# Patient Record
Sex: FEMALE | Race: WHITE | NOT HISPANIC OR LATINO | Employment: FULL TIME | URBAN - METROPOLITAN AREA
[De-identification: names, ages, dates, MRNs, and addresses within clinical notes are randomized per-mention and may not be internally consistent; named-entity substitution may affect disease eponyms.]

---

## 2019-07-10 ENCOUNTER — TRANSFERRED RECORDS (OUTPATIENT)
Dept: HEALTH INFORMATION MANAGEMENT | Facility: CLINIC | Age: 29
End: 2019-07-10

## 2019-07-26 NOTE — TELEPHONE ENCOUNTER
RECORDS RECEIVED FROM: referred by Dr. Eliecer Kerr at [facility] for a mass of the right toe. Appt per pt.   DATE RECEIVED: Aug 1, 2019    NOTES STATUS DETAILS   OFFICE NOTE from referring provider Care Everywhere 7/24/19 Dr. Kerr   OFFICE NOTE from other specialist N/A    DISCHARGE SUMMARY from hospital N/A    DISCHARGE REPORT from the ER N/A    OPERATIVE REPORT N/A    MEDICATION LIST Care Everywhere    IMPLANT RECORD/STICKER N/A    LABS     CBC/DIFF N/A    CULTURES N/A    INJECTIONS DONE IN RADIOLOGY Care Everywhere 7/10/19 - report only in [facility]   MRI N/A    CT SCAN N/A    XRAYS (IMAGES & REPORTS) N/A    TUMOR     PATHOLOGY  Slides & report N/A

## 2019-08-01 ENCOUNTER — OFFICE VISIT (OUTPATIENT)
Dept: ORTHOPEDICS | Facility: CLINIC | Age: 29
End: 2019-08-01
Payer: COMMERCIAL

## 2019-08-01 ENCOUNTER — PRE VISIT (OUTPATIENT)
Dept: ORTHOPEDICS | Facility: CLINIC | Age: 29
End: 2019-08-01

## 2019-08-01 VITALS — BODY MASS INDEX: 22.21 KG/M2 | WEIGHT: 141.5 LBS | HEIGHT: 67 IN

## 2019-08-01 DIAGNOSIS — D23.9 FIBROUS HISTIOCYTOMA: Primary | ICD-10-CM

## 2019-08-01 ASSESSMENT — MIFFLIN-ST. JEOR: SCORE: 1389.47

## 2019-08-01 NOTE — LETTER
8/1/2019       RE: Reyna Martinez  [id number] [facility] [address]     Dear Colleague,    Thank you for referring your patient, Reyna Martinez, to the [facility]. Please see a copy of my visit note below.        [facility], Orthopaedic Oncology Surgery Consultation  by Raoul Seals M.D.    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 We will obtain a biopsy of the tissue to evaluate for possible malignancy. I expect her to continue healing appropriately.      Plan:  1. I will request slides and review pathology at the [facility] for second opinion.  2. I discussed proper wound care for her post-operative course including avoiding exposure to sunlight and using bacitracin to promote healing.   3. Will continue managing with serial observation.  4. We will consider an excision only if the tumor recurs.   5. Follow-up in 6 months.           History of Present Illness:   29 year old female 3 weeks s/p excision of a soft tissue mass of her right great toe. She reports that her mass began growing around November 2018. It was only painful to palpation. She underwent surgery 3 weeks ago to have the mass removed by Dr. Eliecer Zaragoza in podiatry. Etiology was of benign fibrous histiocytoma of the tendon sheath.Today, she reports that she is recovering well following her recent procedure. Her wound continues to heal appropriately and she no other concerns.     Background history:  DX:  1. 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     TREATMENTS:  1. 7/10/19, Right toe mass excision, [place]. Eliecer Zaragoza[place] [facility]         Physical Exam:     EXAMINATION pertinent findings:   VITAL SIGNS:  "Height 1.702 m (5' 7\"), weight 64.2 kg (141 lb 8 oz).  Body mass index is 22.16 kg/m .  MUSCULOSKELETAL:   Gait is normal.  0-20 flexion of the MP and IP joint.  wound is healing.  Sutures in place.  Minimal drainage from excision.    Pre excision photograph:               Data:   Pathology reports via [facility] (7/17/19):  Soft tissue, right toe, excision:  --- Cellular benign fibrous histiocytoma, incompletely excised.  --- Complete excision recommended.     All laboratory data reviewed  All imaging studies reviewed by me    Attending MD (Dr. Raoul Seals) Attestation :  This patient was seen and evaluated by me including a history, exam, and interpretation of all imaging and/or lab data.  Either a training physician (resident/fellow), who also saw the patient, or scribe has documented the clinic visit in the attached note.    Raoul Seals MD  [facility]  Oncology and Adult Reconstructive Surgery  Dept Orthopaedic Surgery, [facility]  [phone] office, [phone] pager  [email or web address]    Scribe Disclosure:  I, Candido Kaye, am serving as a scribe to document services personally performed by Raoul Seals MD at this visit, based upon the provider's statements to me. All documentation has been reviewed by the aforementioned provider prior to being entered into the official medical record.       DATA for DOCUMENTATION:         Past Medical History:   There is no problem list on file for this patient.    No past medical history on file.    Also see scanned health assessment forms.       Past Surgical History:   No past surgical history on file.         Social History:     Social History     Socioeconomic History     Marital status:      Spouse name: Not on file     Number of children: Not on file     Years of education: Not on file     Highest education level: Not on file   Occupational History     Not on file   Social Needs     Financial resource strain: Not on file     Food " insecurity:     Worry: Not on file     Inability: Not on file     Transportation needs:     Medical: Not on file     Non-medical: Not on file   Tobacco Use     Smoking status: Never Smoker     Smokeless tobacco: Never Used   Substance and Sexual Activity     Alcohol use: Never     Drug use: Never     Sexual activity: Yes     Partners: Male     Birth control/protection: Condom   Lifestyle     Physical activity:     Days per week: Not on file     Minutes per session: Not on file     Stress: Not on file   Relationships     Social connections:     Talks on phone: Not on file     Gets together: Not on file     Attends [employer] service: Not on file     Active member of club or organization: Not on file     Attends meetings of clubs or organizations: Not on file     Relationship status: Not on file     Intimate partner violence:     Fear of current or ex partner: Not on file     Emotionally abused: Not on file     Physically abused: Not on file     Forced sexual activity: Not on file   Other Topics Concern     Not on file   Social History Narrative     Not on file            Family History:     Family History   Problem Relation Age of Onset     Diabetes Cousin         My cousin on my moms side and his son have type 1 diabetes     Colon Cancer Other         Uncle passed away with colon cancer     Prostate Cancer Maternal Grandfather      Prostate Cancer Paternal Grandfather             Medications:     Current Outpatient Medications   Medication Sig     Ibuprofen-diphenhydrAMINE Cit (ADVIL PM PO) Take by mouth as needed     No current facility-administered medications for this visit.             Review of Systems:   A comprehensive 10 point review of systems (constitutional, ENT, cardiac, peripheral vascular, lymphatic, respiratory, GI, , Musculoskeletal, skin, Neurological) was performed and found to be negative except as described in this note.     See intake form completed by patient    Again, thank you for allowing  me to participate in the care of your patient.      Sincerely,    Raoul Seals MD

## 2019-08-01 NOTE — NURSING NOTE
"Chief Complaint   Patient presents with     Consult     Pt.states that she is here today after having a Mass Excision of Right Great Toe on 07/10/2019 by Dr. Antwan Butterfield, Podiatrist. She states that she has had no Imaging done of the Toe/Mass. Referring:  ANTWAN BUTTERFIELD, DPLIZ       29 year old  1990    Ht 1.702 m (5' 7\")   Wt 64.2 kg (141 lb 8 oz)   BMI 22.16 kg/m            [facility] [address] [place] [address] - [address]    No Known Allergies    Current Outpatient Medications   Medication     Ibuprofen-diphenhydrAMINE Cit (ADVIL PM PO)     No current facility-administered medications for this visit.                    "

## 2019-08-01 NOTE — PROGRESS NOTES
"    [facility], Orthopaedic Oncology Surgery Consultation  by Raoul Seals M.D.    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 We will obtain a biopsy of the tissue to evaluate for possible malignancy. I expect her to continue healing appropriately.      Plan:  1. I will request slides and review pathology at the [facility] for second opinion.  2. I discussed proper wound care for her post-operative course including avoiding exposure to sunlight and using bacitracin to promote healing.   3. Will continue managing with serial observation.  4. We will consider an excision only if the tumor recurs.   5. Follow-up in 6 months.            History of Present Illness:   29 year old female 3 weeks s/p excision of a soft tissue mass of her right great toe. She reports that her mass began growing around November 2018. It was only painful to palpation. She underwent surgery 3 weeks ago to have the mass removed by Dr. Eliecer Zaragoza in podiatry. Etiology was of benign fibrous histiocytoma of the tendon sheath.Today, she reports that she is recovering well following her recent procedure. Her wound continues to heal appropriately and she no other concerns.       Background history:  DX:  1. 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     TREATMENTS:  1. 7/10/19, Right toe mass excision, Dr. Eliecer Zaragoza, [facility].             Physical Exam:     EXAMINATION pertinent findings:   VITAL SIGNS: Height 1.702 m (5' 7\"), weight 64.2 kg (141 lb 8 oz).  Body mass index is 22.16 kg/m .  MUSCULOSKELETAL:   Gait is normal.  0-20 flexion of the MP and IP joint.  wound is healing.  Sutures in place.  Minimal drainage from excision.      Pre excision " photograph:                 Data:   [place] [facility] ([place]):  Soft tissue, right toe, excision:  --- Cellular benign fibrous histiocytoma, incompletely excised.  --- Complete excision recommended.     All laboratory data reviewed  All imaging studies reviewed by me    Attending MD (Dr. Raoul Seals) Attestation :  This patient was seen and evaluated by me including a history, exam, and interpretation of all imaging and/or lab data.  Either a training physician (resident/fellow), who also saw the patient, or scribe has documented the clinic visit in the attached note.    Raoul Seals MD  [facility] [[email or web address]] Family Professor  Oncology and Adult Reconstructive Surgery  Dept Orthopaedic Surgery, [facility]  [phone] office, [phone] pager  [email or web address]      Scribe Disclosure:  I, Candido Kaye, am serving as a scribe to document services personally performed by Raoul Seals MD at this visit, based upon the provider's statements to me. All documentation has been reviewed by the aforementioned provider prior to being entered into the official medical record.       DATA for DOCUMENTATION:         Past Medical History:   There is no problem list on file for this patient.    No past medical history on file.    Also see scanned health assessment forms.       Past Surgical History:   No past surgical history on file.         Social History:     Social History     Socioeconomic History     Marital status:      Spouse name: Not on file     Number of children: Not on file     Years of education: Not on file     Highest education level: Not on file   Occupational History     Not on file   Social Needs     Financial resource strain: Not on file     Food insecurity:     Worry: Not on file     Inability: Not on file     Transportation needs:     Medical: Not on file     Non-medical: Not on file   Tobacco Use     Smoking status: Never Smoker     Smokeless tobacco: Never Used   Substance and Sexual  Activity     Alcohol use: Never     Drug use: Never     Sexual activity: Yes     Partners: Male     Birth control/protection: Condom   Lifestyle     Physical activity:     Days per week: Not on file     Minutes per session: Not on file     Stress: Not on file   Relationships     Social connections:     Talks on phone: Not on file     Gets together: Not on file     Attends Sikh service: Not on file     Active member of club or organization: Not on file     Attends meetings of clubs or organizations: Not on file     Relationship status: Not on file     Intimate partner violence:     Fear of current or ex partner: Not on file     Emotionally abused: Not on file     Physically abused: Not on file     Forced sexual activity: Not on file   Other Topics Concern     Not on file   Social History Narrative     Not on file            Family History:       Family History   Problem Relation Age of Onset     Diabetes Cousin         My cousin on my moms side and his son have type 1 diabetes     Colon Cancer Other         Uncle passed away with colon cancer     Prostate Cancer Maternal Grandfather      Prostate Cancer Paternal Grandfather             Medications:     Current Outpatient Medications   Medication Sig     Ibuprofen-diphenhydrAMINE Cit (ADVIL PM PO) Take by mouth as needed     No current facility-administered medications for this visit.               Review of Systems:   A comprehensive 10 point review of systems (constitutional, ENT, cardiac, peripheral vascular, lymphatic, respiratory, GI, , Musculoskeletal, skin, Neurological) was performed and found to be negative except as described in this note.     See intake form completed by patient      Answers for HPI/ROS submitted by the patient on 7/29/2019   General Symptoms: No  Skin Symptoms: No  HENT Symptoms: No  EYE SYMPTOMS: No  HEART SYMPTOMS: No  LUNG SYMPTOMS: No  INTESTINAL SYMPTOMS: No  URINARY SYMPTOMS: No  GYNECOLOGIC SYMPTOMS: No  BREAST SYMPTOMS:  No  SKELETAL SYMPTOMS: No  BLOOD SYMPTOMS: No  NERVOUS SYSTEM SYMPTOMS: No  MENTAL HEALTH SYMPTOMS: No

## 2019-08-06 PROCEDURE — 00000346 ZZHCL STATISTIC REVIEW OUTSIDE SLIDES TC 88321: Performed by: ORTHOPAEDIC SURGERY

## 2019-08-11 LAB — COPATH REPORT: NORMAL

## 2019-09-26 ENCOUNTER — TRANSFERRED RECORDS (OUTPATIENT)
Dept: HEALTH INFORMATION MANAGEMENT | Facility: CLINIC | Age: 29
End: 2019-09-26

## 2019-09-26 LAB
HPV ABSTRACT: NORMAL
PAP-ABSTRACT: NORMAL

## 2019-10-17 ENCOUNTER — TRANSFERRED RECORDS (OUTPATIENT)
Dept: HEALTH INFORMATION MANAGEMENT | Facility: CLINIC | Age: 29
End: 2019-10-17

## 2019-10-17 LAB
CHOLEST SERPL-MCNC: 143 MG/DL
HDLC SERPL-MCNC: 67 MG/DL
LDLC SERPL CALC-MCNC: 60 MG/DL
TRIGL SERPL-MCNC: 81 MG/DL

## 2019-10-21 ENCOUNTER — OFFICE VISIT (OUTPATIENT)
Dept: ORTHOPEDICS | Facility: CLINIC | Age: 29
End: 2019-10-21
Payer: COMMERCIAL

## 2019-10-21 ENCOUNTER — ANCILLARY PROCEDURE (OUTPATIENT)
Dept: GENERAL RADIOLOGY | Facility: CLINIC | Age: 29
End: 2019-10-21
Attending: ORTHOPAEDIC SURGERY
Payer: COMMERCIAL

## 2019-10-21 DIAGNOSIS — D23.9 FIBROUS HISTIOCYTOMA: ICD-10-CM

## 2019-10-21 DIAGNOSIS — D23.9 FIBROUS HISTIOCYTOMA: Primary | ICD-10-CM

## 2019-10-21 RX ORDER — ETONOGESTREL AND ETHINYL ESTRADIOL VAGINAL RING .015; .12 MG/D; MG/D
RING VAGINAL
COMMUNITY
Start: 2019-09-26 | End: 2022-04-13

## 2019-10-21 NOTE — LETTER
10/21/2019       RE: Reyna Martinez  [address] [facility] [address]     Dear Colleague,    Thank you for referring your patient, Reyna Martinez, to the [facility] at [facility]. Please see a copy of my visit note below.    [facility], Orthopaedic Oncology Surgery Consultation  by Raoul Seals M.D.    Reyna Martinez MRN# [id number]   Age: 29 year old YOB: 1990     Requesting physician: Morelia Patel     Background history:  DX:  1. 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      TREATMENTS:  1. 7/10/19, Right toe mass excision, Dr. Eliecer Zaragoza, [facility].            History of Present Illness:   29 year old female  3 months s/p excision of a soft tissue mass of her right great toe.  I last evaluated this patient on 08/01/2019 and she reported that her mass began growing around November 2018. Reported an etiology of of benign fibrous histiocytoma of the tendon sheath. We elected to proceed with a pathology consult and watchful waiting between that time and our 6 month follow up. Today, she reports that the excised big toe mass has begun to grow back. The mass was larger the first time but she notes that the new mass has gotten more sensitive and is growing. She is unable to bend her DIP joint of her right big toe. 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            Physical Exam:     EXAMINATION pertinent findings:   MUSCULOSKELETAL:                                Data:   All laboratory data reviewed  All imaging studies reviewed by me    XR Right Toe - 2 Views (10/21/2019):  No acute osseous abnormality.         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 If we were to excise this mass, she could possibly lose her tendon and the skin in an oval shaped defect around the mass will be created. She also has an toe  extension contracture that could be aggravated by surgery. We spoke about IP joint fusing to retain walking function if a deformity develops. Her procedure may require the collaboration of a tumor, foot, and plastic surgeon for optimal success.     Plan:  1. I will discuss with my colleagues at the plastics service re soft tissue reconstruction as re-excision will be necessary.  2. Ordered XR and MRI for further evaluation of the mass.  3. Follow-up with imaging results.    Total Time = 25 min, 50% of which was spent in counseling and coordination of care as documented above.    Attending MD (Dr. Raoul Seals) Attestation :  This patient was seen and evaluated by me including a history, exam, and interpretation of all imaging and/or lab data.  Either a training physician (resident/fellow), who also saw the patient, or scribe has documented the clinic visit in the attached note.    Raoul Seals MD  [facility] Professor  Oncology and Adult Reconstructive Surgery  Dept Orthopaedic Surgery, [facility]  [phone] office, [phone] pager  [email or web address]    Scribe Disclosure:  I, Amador Buchanan, am serving as a scribe to document services personally performed by Raoul Seals MD at this visit, based upon the provider's statements to me. All documentation has been reviewed by the aforementioned provider prior to being entered into the official medical record.     Answers for HPI/ROS submitted by the patient on 10/17/2019   General Symptoms: No  Skin Symptoms: No  HENT Symptoms: No  EYE SYMPTOMS: No  HEART SYMPTOMS: No  LUNG SYMPTOMS: No  INTESTINAL SYMPTOMS: No  URINARY SYMPTOMS: No  GYNECOLOGIC SYMPTOMS: No  BREAST SYMPTOMS: No  SKELETAL SYMPTOMS: No  BLOOD SYMPTOMS: No  NERVOUS SYSTEM SYMPTOMS: No  MENTAL HEALTH SYMPTOMS: No

## 2019-10-21 NOTE — NURSING NOTE
Chief Complaint   Patient presents with     RECHECK     Pt. states that she is here today for Poss. Recur. of her Right Toe mass that is above her incision.        29 year old  1990      [facility] - [place], [place] - [address]    No Known Allergies    Current Outpatient Medications   Medication     etonogestrel-ethinyl estradiol (NUVARING) 0.12-0.015 MG/24HR vaginal ring     Ibuprofen-diphenhydrAMINE Cit (ADVIL PM PO)     vitamin D3 (D3-50) 58355 units capsule     No current facility-administered medications for this visit.

## 2019-10-21 NOTE — PROGRESS NOTES
[facility] Phys[facility], Orthopaedic Oncology Surgery Consultation  by Raoul Seals M.D.    Reyna Martinez MRN# [id number]   Age: 29 year old YOB: 1990     Requesting physician: Morelia Patel     Background history:  DX:  1. 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      TREATMENTS:  1. 7/10/19, Right toe mass excision, [place]. Eliecer Zaragoza, [facility]            History of Present Illness:   29 year old female 3 months s/p excision of a soft tissue mass of her right great toe. I last evaluated this patient on 08/01/2019 and she reported that her mass began growing around November 2018. Reported an etiology of of benign fibrous histiocytoma of the tendon sheath. We elected to proceed with a pathology consult and watchful waiting between that time and our 6 month follow up. Today, she reports that the excised big toe mass has begun to grow back. The mass was larger the first time but she notes that the new mass has gotten more sensitive and is growing. She is unable to bend her DIP joint of her right big toe. 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            Physical Exam:     EXAMINATION pertinent findings:   MUSCULOSKELETAL:                                Data:   All laboratory data reviewed  All imaging studies reviewed by me    XR Right Toe - 2 Views (10/21/2019):  No acute osseous abnormality.         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 If we were to excise this mass, she could possibly lose her tendon and the skin in an oval shaped defect around the mass will be created. She also has an toe extension contracture that could be aggravated by surgery. We spoke about IP joint fusing to retain walking function if a deformity develops. Her procedure may require the collaboration of a tumor, foot, and plastic surgeon for optimal success.     Plan:  1. I will discuss with my  colleagues at the plastics service re soft tissue reconstruction as re-excision will be necessary.  2. Ordered XR and MRI for further evaluation of the mass.  3. Follow-up with imaging results.    Total Time = 25 min, 50% of which was spent in counseling and coordination of care as documented above.      Attending MD (Dr. Raoul Seals) Attestation :  This patient was seen and evaluated by me including a history, exam, and interpretation of all imaging and/or lab data.  Either a training physician (resident/fellow), who also saw the patient, or scribe has documented the clinic visit in the attached note.    MD Baylee Wakefield Family Professor  Oncology and Adult Reconstructive Surgery  Dept Orthopaedic Surgery, [facility]  [phone] office, [phone] pager  [email or web address]    Scribe Disclosure:  IAmador, am serving as a scribe to document services personally performed by Raoul Seals MD at this visit, based upon the provider's statements to me. All documentation has been reviewed by the aforementioned provider prior to being entered into the official medical record.     Answers for HPI/ROS submitted by the patient on 10/17/2019   General Symptoms: No  Skin Symptoms: No  HENT Symptoms: No  EYE SYMPTOMS: No  HEART SYMPTOMS: No  LUNG SYMPTOMS: No  INTESTINAL SYMPTOMS: No  URINARY SYMPTOMS: No  GYNECOLOGIC SYMPTOMS: No  BREAST SYMPTOMS: No  SKELETAL SYMPTOMS: No  BLOOD SYMPTOMS: No  NERVOUS SYSTEM SYMPTOMS: No  MENTAL HEALTH SYMPTOMS: No

## 2019-10-28 ENCOUNTER — TELEPHONE (OUTPATIENT)
Dept: ORTHOPEDICS | Facility: CLINIC | Age: 29
End: 2019-10-28

## 2019-10-28 NOTE — TELEPHONE ENCOUNTER
DIAGNOSIS: Right big toe mass.  Ref. Dr. Seals   APPOINTMENT DATE: Oct 29, 2019    NOTES STATUS DETAILS   OFFICE NOTE from referring provider Internal 10/21/19 Dr. Seals   OFFICE NOTE from other specialist Care Everywhere 8/1/19 Dr. Kerr   DISCHARGE SUMMARY from hospital N/A    DISCHARGE REPORT from the ER N/A    OPERATIVE REPORT Care Everywhere 7/10/19 Dr. Kerr    MEDICATION LIST Internal    IMPLANT RECORD/STICKER N/A    LABS     CBC/DIFF Care Everywhere 10/17/19   CULTURES N/A    INJECTIONS DONE IN RADIOLOGY N/A    MRI Internal 10/29/19   CT SCAN N/A    XRAYS (IMAGES & REPORTS) Internal 10/21/19   TUMOR     PATHOLOGY  Slides & report Care Everywhere 7/10/19    8/6/19 internal

## 2019-10-29 ENCOUNTER — OFFICE VISIT (OUTPATIENT)
Dept: ORTHOPEDICS | Facility: CLINIC | Age: 29
End: 2019-10-29
Payer: COMMERCIAL

## 2019-10-29 ENCOUNTER — ANCILLARY PROCEDURE (OUTPATIENT)
Dept: MRI IMAGING | Facility: CLINIC | Age: 29
End: 2019-10-29
Attending: ORTHOPAEDIC SURGERY
Payer: COMMERCIAL

## 2019-10-29 ENCOUNTER — PRE VISIT (OUTPATIENT)
Dept: ORTHOPEDICS | Facility: CLINIC | Age: 29
End: 2019-10-29

## 2019-10-29 DIAGNOSIS — D21.21: Primary | ICD-10-CM

## 2019-10-29 DIAGNOSIS — D23.9 FIBROUS HISTIOCYTOMA: ICD-10-CM

## 2019-10-29 PROCEDURE — 73720 MRI LWR EXTREMITY W/O&W/DYE: CPT | Mod: RT | Performed by: RADIOLOGY

## 2019-10-29 PROCEDURE — A9585 GADOBUTROL INJECTION: HCPCS | Performed by: ORTHOPAEDIC SURGERY

## 2019-10-29 RX ORDER — GADOBUTROL 604.72 MG/ML
7.5 INJECTION INTRAVENOUS ONCE
Status: COMPLETED | OUTPATIENT
Start: 2019-10-29 | End: 2019-10-29

## 2019-10-29 RX ADMIN — GADOBUTROL 6.5 ML: 604.72 INJECTION INTRAVENOUS at 09:05

## 2019-10-29 NOTE — LETTER
10/29/2019       RE: Reyna Martinez  [id number] [facility] [address]     Dear Colleague,    Thank you for referring your patient, Reyna Martinez, to the [facility] at [facility]. Please see a copy of my visit note below.    REFERRING PROVIDER: Raoul Seals    REASON FOR CONSULTATION: Possible soft tissue coverage following revision excision of right great toe fibrous histiocytoma.    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  She first noticed it in the fall 2018 and then underwent excision in the summer of this year.  She now has recurrence of the tumor.  It is symptomatic with pain especially when it is bumped into objects.  She has difficulty flexing the toe IP joint, though is able to extend it.  He denies any numbness or tingling.      MEDS:   Prior to Admission medications    Medication Sig Start Date End Date Taking? Authorizing Provider   etonogestrel-ethinyl estradiol (NUVARING) 0.12-0.015 MG/24HR vaginal ring Place vaginally every 28 days 9/26/19 9/25/20  Reported, Patient   Ibuprofen-diphenhydrAMINE Cit (ADVIL PM PO) Take by mouth as needed    Reported, Patient   vitamin D3 (D3-50) 72029 units capsule Take 50,000 Units by mouth 10/18/19 10/17/20  Reported, Patient       ALLERGIES: No Known Allergies    PMH: No past medical history on file.    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    SH:   Social History     Tobacco Use     Smoking status: Never Smoker     Smokeless tobacco: Never Used   Substance Use Topics     Alcohol use: Never       FH:   Family History   Problem Relation Age of Onset     Diabetes Cousin         My cousin on my moms side and his son have type 1 diabetes      Colon Cancer Other         Uncle passed away with colon cancer     Prostate Cancer Maternal Grandfather      Prostate Cancer Paternal Grandfather        ROS: Denies chest pain, shortness of breath, diabetes, MI, CVA, DVT, PE, and bleeding disorders.    PHYSICAL EXAMINATION:   General: No acute distress.  Right foot examination was performed in the presence of chaperone.  This revealed a 2.5 x 2.5 cm tender enlarged mass over the dorsum of the right great toe along its proximal aspect.  There is no sign of infection such as erythema or drainage.  Sensation light touch is intact over the tip of the right great toe.  She has difficulty flexing the IP joints, but has no difficulty extending it.  Dorsalis pedis and posterior tibial artery pulses are palpable.  There is a tattoo over the dorsum of the foot.    IMAGING: MRI of right great toe:  1. Corresponding to the skin marker, there is a circumscribed,  nonenhancing soft tissue mass overlying the dorsal proximal first  phalanx which may encase the adjacent tendon sheath, favoring  recurrence of benign fibrocystic histiocytoma. Comparison with prior  MRI if available would be helpful to compare signal characteristics.  2. Small joint effusion and subchondral edema at the first  metatarsal-phalangeal joint, likely degenerative.     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    PLAN: I explained to the patient that a composite soft tissue defect may be created at the dorsum of her right great toe to clear this tumor.  This may expose underlying tendon or bone.  If either of these 2 structures are exposed, we will require coverage of the wound with a flap.  Locally, she has an available reverse first dorsal metatarsal artery flap.  This will unfortunately involve her existing tattoo and will likely disfigure the foot.  A skin graft will likely be required to cover the donor site of the flap.  In all, this will take 3 to  4 hours as an outpatient with a potential overnight stay.  Another option would be to perform free tissue transfer from the right groin to the right foot utilizing the dorsalis pedis vessels as recipient vessels.  This will take 6 to 8 hours as an inpatient with postoperative admission for monitoring of the flap closely.  I explained the risks with surgery to include bleeding, infection, injury to surrounding structures, recurrence, chronic pain, chronic stiffness, need for revision surgery.  Patient accepts the associated risks and wishes to proceed with surgery.  My preference will be to obtain clear margins prior to definitive soft tissue reconstruction.  I will message Dr. Seals's team and coordinate a plan with them.  All questions were answered.    Total time spent with patient was 30 min of which greater than 50% was in counseling.    Answers for HPI/ROS submitted by the patient on 10/29/2019   General Symptoms: No  Skin Symptoms: No  HENT Symptoms: No  EYE SYMPTOMS: No  HEART SYMPTOMS: No  LUNG SYMPTOMS: No  INTESTINAL SYMPTOMS: No  URINARY SYMPTOMS: No  GYNECOLOGIC SYMPTOMS: No  BREAST SYMPTOMS: No  SKELETAL SYMPTOMS: No  BLOOD SYMPTOMS: No  NERVOUS SYSTEM SYMPTOMS: No  MENTAL HEALTH SYMPTOMS: No    Renato Tinoco MD

## 2019-10-29 NOTE — NURSING NOTE
Reason For Visit:   Chief Complaint   Patient presents with     Consult     Right [place] Mass. Ref. Dr Seals       Primary MD: Morelia Atkinson  Ref. MD: Dr Seals    Age: 29 year old    ?  No      There were no vitals taken for this visit.      Pain Assessment  Patient Currently in Pain: No      Current Outpatient Medications   Medication Sig Dispense Refill     etonogestrel-ethinyl estradiol (NUVARING) 0.12-0.015 MG/24HR vaginal ring Place vaginally every 28 days       Ibuprofen-diphenhydrAMINE Cit (ADVIL PM PO) Take by mouth as needed       vitamin D3 (D3-50) 43497 units capsule Take 50,000 Units by mouth         No Known Allergies    Nancy Barrios ATC

## 2019-10-29 NOTE — PROGRESS NOTES
REFERRING PROVIDER: Raoul Seals    REASON FOR CONSULTATION: Possible soft tissue coverage following revision excision of right great toe fibrous histiocytoma.    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  She first noticed it in the fall 2018 and then underwent excision in the summer of this year.  She now has recurrence of the tumor.  It is symptomatic with pain especially when it is bumped into objects.  She has difficulty flexing the toe IP joint, though is able to extend it.  He denies any numbness or tingling.      MEDS:   Prior to Admission medications    Medication Sig Start Date End Date Taking? Authorizing Provider   etonogestrel-ethinyl estradiol (NUVARING) 0.12-0.015 MG/24HR vaginal ring Place vaginally every 28 days 9/26/19 9/25/20  Reported, Patient   Ibuprofen-diphenhydrAMINE Cit (ADVIL PM PO) Take by mouth as needed    Reported, Patient   vitamin D3 (D3-50) 19691 units capsule Take 50,000 Units by mouth 10/18/19 10/17/20  Reported, Patient       ALLERGIES: No Known Allergies    PMH: No past medical history on file.    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    SH:   Social History     Tobacco Use     Smoking status: Never Smoker     Smokeless tobacco: Never Used   Substance Use Topics     Alcohol use: Never       FH:   Family History   Problem Relation Age of Onset     Diabetes Cousin         My cousin on my moms side and his son have type 1 diabetes     Colon Cancer Other         Uncle passed away with colon cancer     Prostate Cancer Maternal Grandfather      Prostate Cancer Paternal Grandfather        ROS: Denies chest pain, shortness of breath, diabetes, MI, CVA, DVT, PE, and bleeding disorders.    PHYSICAL EXAMINATION:    General: No acute distress.  Right foot examination was performed in the presence of chaperone.  This revealed a 2.5 x 2.5 cm tender enlarged mass over the dorsum of the right great toe along its proximal aspect.  There is no sign of infection such as erythema or drainage.  Sensation light touch is intact over the tip of the right great toe.  She has difficulty flexing the IP joints, but has no difficulty extending it.  Dorsalis pedis and posterior tibial artery pulses are palpable.  There is a tattoo over the dorsum of the foot.    IMAGING: MRI of right great toe:  1. Corresponding to the skin marker, there is a circumscribed,  nonenhancing soft tissue mass overlying the dorsal proximal first  phalanx which may encase the adjacent tendon sheath, favoring  recurrence of benign fibrocystic histiocytoma. Comparison with prior  MRI if available would be helpful to compare signal characteristics.  2. Small joint effusion and subchondral edema at the first  metatarsal-phalangeal joint, likely degenerative.     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    PLAN: I explained to the patient that a composite soft tissue defect may be created at the dorsum of her right great toe to clear this tumor.  This may expose underlying tendon or bone.  If either of these 2 structures are exposed, we will require coverage of the wound with a flap.  Locally, she has an available reverse first dorsal metatarsal artery flap.  This will unfortunately involve her existing tattoo and will likely disfigure the foot.  A skin graft will likely be required to cover the donor site of the flap.  In all, this will take 3 to 4 hours as an outpatient with a potential overnight stay.  Another option would be to perform free tissue transfer from the right groin to the right foot utilizing the dorsalis pedis vessels as recipient vessels.  This will take 6 to 8 hours as an inpatient with postoperative  admission for monitoring of the flap closely.  I explained the risks with surgery to include bleeding, infection, injury to surrounding structures, recurrence, chronic pain, chronic stiffness, need for revision surgery.  Patient accepts the associated risks and wishes to proceed with surgery.  My preference will be to obtain clear margins prior to definitive soft tissue reconstruction.  I will message Dr. Seals's team and coordinate a plan with them.  All questions were answered.    Total time spent with patient was 30 min of which greater than 50% was in counseling.    Answers for HPI/ROS submitted by the patient on 10/29/2019   General Symptoms: No  Skin Symptoms: No  HENT Symptoms: No  EYE SYMPTOMS: No  HEART SYMPTOMS: No  LUNG SYMPTOMS: No  INTESTINAL SYMPTOMS: No  URINARY SYMPTOMS: No  GYNECOLOGIC SYMPTOMS: No  BREAST SYMPTOMS: No  SKELETAL SYMPTOMS: No  BLOOD SYMPTOMS: No  NERVOUS SYSTEM SYMPTOMS: No  MENTAL HEALTH SYMPTOMS: No      11/13/2019 ADDENDUM  Discussed case with Dr. Seals. Given this is a benign tumor, goal is for great toe salvage. Patient is in agreement. Will plan for simultaneous tumor excision and immediate wound coverage with possible skin graft, possible biologic dressing. May require pinning for stability. Patient was updated on plan and counseled over phone.

## 2019-11-13 DIAGNOSIS — D21.21: Primary | ICD-10-CM

## 2019-11-13 RX ORDER — CEFAZOLIN SODIUM 2 G/50ML
2 SOLUTION INTRAVENOUS
Status: CANCELLED | OUTPATIENT
Start: 2019-11-13

## 2019-11-13 RX ORDER — CEFAZOLIN SODIUM 1 G/50ML
1 INJECTION, SOLUTION INTRAVENOUS SEE ADMIN INSTRUCTIONS
Status: CANCELLED | OUTPATIENT
Start: 2019-11-13

## 2019-11-14 DIAGNOSIS — R22.40 MASS OF TOE: Primary | ICD-10-CM

## 2019-11-18 ENCOUNTER — TELEPHONE (OUTPATIENT)
Dept: ORTHOPEDICS | Facility: CLINIC | Age: 29
End: 2019-11-18

## 2019-11-18 ENCOUNTER — PREP FOR PROCEDURE (OUTPATIENT)
Dept: ORTHOPEDICS | Facility: CLINIC | Age: 29
End: 2019-11-18

## 2019-11-18 DIAGNOSIS — R22.40 MASS OF TOE: Primary | ICD-10-CM

## 2019-11-18 NOTE — TELEPHONE ENCOUNTER
Patient is scheduled for surgery with Dr. Seals & Dr. Tinoco    Spoke or left message with: Patient    Date of Surgery: 12/6/19    Location: [facility]    Post op: To follow up with Dr. Tinoco    Pre-op with surgeon (if applicable): Complete    H&P: Patient will schedule with PCP    Additional imaging/appointments: N/A    Surgery packet: Mailed to patient's home 11/18/19    Additional comments: N/A

## 2019-11-25 ENCOUNTER — TEAM CONFERENCE (OUTPATIENT)
Dept: OTHER | Facility: CLINIC | Age: 29
End: 2019-11-25

## 2019-11-25 NOTE — TELEPHONE ENCOUNTER
SURGERY PLAN (PRE-OP PLAN)     Patient Position (indicated by x):  X  Supine     Supine with torso rolled up on a bump      Floppy lateral on torso length bean bag      Lateral decubitus, bean bag, full length      Lateral decubitus, Wixson hip positioner      Safety paddle side supports x 2 clamped to side rail      Lithotomy, both legs in yellow padded leg rachel      Lithotomy, single leg in yellow padded leg rachel      Prone on blanket rolls/round gel pad      Prone on Brody (arched) frame on Francois table      Single thigh in orange arthroscopy clamp      Beach chair semi recumbent      Arm out on radiolucent arm table      Split drape with top bar      Revision EZIO drape with plastic side bags for leg   X  Extremity drape      Shoulder pack drape      Shah catheter            Fracture Table     Francois x-ray table   X  Regular OR table              General Equipment Requests (indicated by x):       C-Arm with C-Armor drape      O-Arm with Stealth imaging      Arnel Biopsy trephine set w/ K-wire & pituitary rongeurs   X  Small pituitary rongeur   X   Arnel's angled curettes, narrow shaft      Bone graft, kapner gouges      Midas Saul [employer] fred, electric motor      Small fragment set in case of IP desis ON BACKUP      [place] [employer] BMAC stem cell      Vancomycin 1 gram powder      Zometa 4 mg vials      Depo Medrol steroid      Blunt Pelvic Retractor (.55, Blunt Hohmann with  slight bend)      (1) Portable hand held radiation detector machine for sentinel node biopsy and (2) Lymphazurin    X  [place] Osteotomes      Specimens and cultures (indicated by x):      Tissue cultures, aerobic and anaerobic without gram stain     Frozen section     pathology specimens - fresh    X  pathology specimens - formalin      Summary:    29 year old female s/p excision of a benign fibrous histiocytoma of the right big toe tendon sheath.     Plan:  Re-excision  fibrous histiocytoma of the right extensor hallucis tendon  sheath.    Plan Dr Tinoco:  I explained to the patient that a composite soft tissue defect may be created at the dorsum of her right great toe to clear this tumor.  This may expose underlying tendon or bone.  If either of these 2 structures are exposed, we will require coverage of the wound with a flap.  Locally, she has an available reverse first dorsal metatarsal artery flap.  This will unfortunately involve her existing tattoo and will likely disfigure the foot.  A skin graft will likely be required to cover the donor site of the flap.  In all, this will take 3 to 4 hours as an outpatient with a potential overnight stay.  Another option would be to perform free tissue transfer from the right groin to the right foot utilizing the dorsalis pedis vessels as recipient vessels.  This will take 6 to 8 hours as an inpatient with postoperative admission for monitoring of the flap closely.  I explained the risks with surgery to include bleeding, infection, injury to surrounding structures, recurrence, chronic pain, chronic stiffness, need for revision surgery.  Patient accepts the associated risks and wishes to proceed with surgery.  My preference will be to obtain clear margins prior to definitive soft tissue reconstruction.    Background history:  DX:  1. 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      TREATMENTS:  1. 7/10/19, Right [place] mass [place] Zar[place] [facility] [place]  Josh White MD  Orthopaedic Surgery, Adult Reconstruction Fellow  Pager [phone]

## 2019-12-06 ENCOUNTER — ANESTHESIA EVENT (OUTPATIENT)
Dept: SURGERY | Facility: CLINIC | Age: 29
End: 2019-12-06
Payer: COMMERCIAL

## 2019-12-06 ENCOUNTER — HOSPITAL ENCOUNTER (OUTPATIENT)
Facility: CLINIC | Age: 29
Discharge: HOME | End: 2019-12-06
Attending: ORTHOPAEDIC SURGERY | Admitting: ORTHOPAEDIC SURGERY
Payer: COMMERCIAL

## 2019-12-06 ENCOUNTER — ANESTHESIA (OUTPATIENT)
Dept: SURGERY | Facility: CLINIC | Age: 29
End: 2019-12-06
Payer: COMMERCIAL

## 2019-12-06 VITALS
SYSTOLIC BLOOD PRESSURE: 102 MMHG | BODY MASS INDEX: 21.38 KG/M2 | HEART RATE: 69 BPM | DIASTOLIC BLOOD PRESSURE: 61 MMHG | HEIGHT: 67 IN | WEIGHT: 136.24 LBS | TEMPERATURE: 98.6 F | RESPIRATION RATE: 15 BRPM | OXYGEN SATURATION: 98 %

## 2019-12-06 DIAGNOSIS — R22.40 MASS OF TOE: ICD-10-CM

## 2019-12-06 DIAGNOSIS — D21.21: Primary | ICD-10-CM

## 2019-12-06 LAB
GLUCOSE SERPL-MCNC: 88 MG/DL (ref 70–99)
HCG UR QL: NEGATIVE
HGB BLD-MCNC: 13.4 G/DL (ref 11.7–15.7)

## 2019-12-06 PROCEDURE — 25800030 ZZH RX IP 258 OP 636: Performed by: ANESTHESIOLOGY

## 2019-12-06 PROCEDURE — 25000132 ZZH RX MED GY IP 250 OP 250 PS 637: Performed by: ANESTHESIOLOGY

## 2019-12-06 PROCEDURE — 85018 HEMOGLOBIN: CPT | Performed by: ANESTHESIOLOGY

## 2019-12-06 PROCEDURE — 25000128 H RX IP 250 OP 636: Performed by: NURSE ANESTHETIST, CERTIFIED REGISTERED

## 2019-12-06 PROCEDURE — 37000009 ZZH ANESTHESIA TECHNICAL FEE, EACH ADDTL 15 MIN: Performed by: ORTHOPAEDIC SURGERY

## 2019-12-06 PROCEDURE — 25000128 H RX IP 250 OP 636: Performed by: ORTHOPAEDIC SURGERY

## 2019-12-06 PROCEDURE — 25000128 H RX IP 250 OP 636: Performed by: ANESTHESIOLOGY

## 2019-12-06 PROCEDURE — 25000125 ZZHC RX 250: Performed by: NURSE ANESTHETIST, CERTIFIED REGISTERED

## 2019-12-06 PROCEDURE — 88342 IMHCHEM/IMCYTCHM 1ST ANTB: CPT | Mod: 26 | Performed by: ORTHOPAEDIC SURGERY

## 2019-12-06 PROCEDURE — 88342 IMHCHEM/IMCYTCHM 1ST ANTB: CPT | Performed by: ORTHOPAEDIC SURGERY

## 2019-12-06 PROCEDURE — 36415 COLL VENOUS BLD VENIPUNCTURE: CPT | Performed by: ANESTHESIOLOGY

## 2019-12-06 PROCEDURE — 36000057 ZZH SURGERY LEVEL 3 1ST 30 MIN - UMMC: Performed by: ORTHOPAEDIC SURGERY

## 2019-12-06 PROCEDURE — 40000170 ZZH STATISTIC PRE-PROCEDURE ASSESSMENT II: Performed by: ORTHOPAEDIC SURGERY

## 2019-12-06 PROCEDURE — 71000014 ZZH RECOVERY PHASE 1 LEVEL 2 FIRST HR: Performed by: ORTHOPAEDIC SURGERY

## 2019-12-06 PROCEDURE — 81025 URINE PREGNANCY TEST: CPT | Performed by: ANESTHESIOLOGY

## 2019-12-06 PROCEDURE — 37000008 ZZH ANESTHESIA TECHNICAL FEE, 1ST 30 MIN: Performed by: ORTHOPAEDIC SURGERY

## 2019-12-06 PROCEDURE — 25000128 H RX IP 250 OP 636: Performed by: PLASTIC SURGERY

## 2019-12-06 PROCEDURE — 36000059 ZZH SURGERY LEVEL 3 EA 15 ADDTL MIN UMMC: Performed by: ORTHOPAEDIC SURGERY

## 2019-12-06 PROCEDURE — 88341 IMHCHEM/IMCYTCHM EA ADD ANTB: CPT | Mod: 26 | Performed by: ORTHOPAEDIC SURGERY

## 2019-12-06 PROCEDURE — 25000125 ZZHC RX 250: Performed by: ORTHOPAEDIC SURGERY

## 2019-12-06 PROCEDURE — 25000132 ZZH RX MED GY IP 250 OP 250 PS 637: Performed by: PLASTIC SURGERY

## 2019-12-06 PROCEDURE — 71000027 ZZH RECOVERY PHASE 2 EACH 15 MINS: Performed by: ORTHOPAEDIC SURGERY

## 2019-12-06 PROCEDURE — 88307 TISSUE EXAM BY PATHOLOGIST: CPT | Mod: 26 | Performed by: ORTHOPAEDIC SURGERY

## 2019-12-06 PROCEDURE — 88307 TISSUE EXAM BY PATHOLOGIST: CPT | Performed by: ORTHOPAEDIC SURGERY

## 2019-12-06 PROCEDURE — 25000566 ZZH SEVOFLURANE, EA 15 MIN: Performed by: ORTHOPAEDIC SURGERY

## 2019-12-06 PROCEDURE — 88341 IMHCHEM/IMCYTCHM EA ADD ANTB: CPT | Performed by: ORTHOPAEDIC SURGERY

## 2019-12-06 PROCEDURE — 82947 ASSAY GLUCOSE BLOOD QUANT: CPT | Performed by: ANESTHESIOLOGY

## 2019-12-06 PROCEDURE — 27210794 ZZH OR GENERAL SUPPLY STERILE: Performed by: ORTHOPAEDIC SURGERY

## 2019-12-06 DEVICE — INTEGRA® BILAYER MATRIX WOUND DRESSING 2 IN*2 IN (5 CM*5 CM)
Type: IMPLANTABLE DEVICE | Site: TOE | Status: FUNCTIONAL
Brand: INTEGRA®

## 2019-12-06 RX ORDER — OXYCODONE HYDROCHLORIDE 5 MG/1
5 TABLET ORAL
Status: COMPLETED | OUTPATIENT
Start: 2019-12-06 | End: 2019-12-06

## 2019-12-06 RX ORDER — ONDANSETRON 2 MG/ML
INJECTION INTRAMUSCULAR; INTRAVENOUS PRN
Status: DISCONTINUED | OUTPATIENT
Start: 2019-12-06 | End: 2019-12-06

## 2019-12-06 RX ORDER — OXYCODONE HYDROCHLORIDE 5 MG/1
5-10 TABLET ORAL EVERY 4 HOURS PRN
Status: DISCONTINUED | OUTPATIENT
Start: 2019-12-06 | End: 2019-12-06 | Stop reason: HOSPADM

## 2019-12-06 RX ORDER — MEPERIDINE HYDROCHLORIDE 25 MG/ML
12.5 INJECTION INTRAMUSCULAR; INTRAVENOUS; SUBCUTANEOUS
Status: DISCONTINUED | OUTPATIENT
Start: 2019-12-06 | End: 2019-12-06 | Stop reason: HOSPADM

## 2019-12-06 RX ORDER — FENTANYL CITRATE 50 UG/ML
25-50 INJECTION, SOLUTION INTRAMUSCULAR; INTRAVENOUS
Status: DISCONTINUED | OUTPATIENT
Start: 2019-12-06 | End: 2019-12-06 | Stop reason: HOSPADM

## 2019-12-06 RX ORDER — ONDANSETRON 4 MG/1
4 TABLET, ORALLY DISINTEGRATING ORAL
Status: DISCONTINUED | OUTPATIENT
Start: 2019-12-06 | End: 2019-12-06 | Stop reason: HOSPADM

## 2019-12-06 RX ORDER — DEXAMETHASONE SODIUM PHOSPHATE 4 MG/ML
INJECTION, SOLUTION INTRA-ARTICULAR; INTRALESIONAL; INTRAMUSCULAR; INTRAVENOUS; SOFT TISSUE PRN
Status: DISCONTINUED | OUTPATIENT
Start: 2019-12-06 | End: 2019-12-06

## 2019-12-06 RX ORDER — PROPOFOL 10 MG/ML
INJECTION, EMULSION INTRAVENOUS PRN
Status: DISCONTINUED | OUTPATIENT
Start: 2019-12-06 | End: 2019-12-06

## 2019-12-06 RX ORDER — ONDANSETRON 4 MG/1
4 TABLET, ORALLY DISINTEGRATING ORAL EVERY 30 MIN PRN
Status: DISCONTINUED | OUTPATIENT
Start: 2019-12-06 | End: 2019-12-06 | Stop reason: HOSPADM

## 2019-12-06 RX ORDER — GABAPENTIN 100 MG/1
300 CAPSULE ORAL ONCE
Status: COMPLETED | OUTPATIENT
Start: 2019-12-06 | End: 2019-12-06

## 2019-12-06 RX ORDER — ONDANSETRON 2 MG/ML
4 INJECTION INTRAMUSCULAR; INTRAVENOUS EVERY 30 MIN PRN
Status: DISCONTINUED | OUTPATIENT
Start: 2019-12-06 | End: 2019-12-06 | Stop reason: HOSPADM

## 2019-12-06 RX ORDER — NALOXONE HYDROCHLORIDE 0.4 MG/ML
.1-.4 INJECTION, SOLUTION INTRAMUSCULAR; INTRAVENOUS; SUBCUTANEOUS
Status: DISCONTINUED | OUTPATIENT
Start: 2019-12-06 | End: 2019-12-06 | Stop reason: HOSPADM

## 2019-12-06 RX ORDER — FENTANYL CITRATE 50 UG/ML
INJECTION, SOLUTION INTRAMUSCULAR; INTRAVENOUS PRN
Status: DISCONTINUED | OUTPATIENT
Start: 2019-12-06 | End: 2019-12-06

## 2019-12-06 RX ORDER — CEFAZOLIN SODIUM 2 G/100ML
2 INJECTION, SOLUTION INTRAVENOUS
Status: DISCONTINUED | OUTPATIENT
Start: 2019-12-06 | End: 2019-12-06 | Stop reason: HOSPADM

## 2019-12-06 RX ORDER — CEFAZOLIN SODIUM 2 G/100ML
2 INJECTION, SOLUTION INTRAVENOUS
Status: COMPLETED | OUTPATIENT
Start: 2019-12-06 | End: 2019-12-06

## 2019-12-06 RX ORDER — ALBUTEROL SULFATE 0.83 MG/ML
2.5 SOLUTION RESPIRATORY (INHALATION) EVERY 4 HOURS PRN
Status: DISCONTINUED | OUTPATIENT
Start: 2019-12-06 | End: 2019-12-06 | Stop reason: HOSPADM

## 2019-12-06 RX ORDER — SODIUM CHLORIDE, SODIUM LACTATE, POTASSIUM CHLORIDE, CALCIUM CHLORIDE 600; 310; 30; 20 MG/100ML; MG/100ML; MG/100ML; MG/100ML
INJECTION, SOLUTION INTRAVENOUS CONTINUOUS
Status: DISCONTINUED | OUTPATIENT
Start: 2019-12-06 | End: 2019-12-06 | Stop reason: HOSPADM

## 2019-12-06 RX ORDER — HYDROMORPHONE HYDROCHLORIDE 1 MG/ML
.3-.5 INJECTION, SOLUTION INTRAMUSCULAR; INTRAVENOUS; SUBCUTANEOUS EVERY 10 MIN PRN
Status: DISCONTINUED | OUTPATIENT
Start: 2019-12-06 | End: 2019-12-06 | Stop reason: HOSPADM

## 2019-12-06 RX ORDER — MAGNESIUM HYDROXIDE 1200 MG/15ML
LIQUID ORAL PRN
Status: DISCONTINUED | OUTPATIENT
Start: 2019-12-06 | End: 2019-12-06 | Stop reason: HOSPADM

## 2019-12-06 RX ORDER — DEXAMETHASONE SODIUM PHOSPHATE 4 MG/ML
4 INJECTION, SOLUTION INTRA-ARTICULAR; INTRALESIONAL; INTRAMUSCULAR; INTRAVENOUS; SOFT TISSUE EVERY 10 MIN PRN
Status: DISCONTINUED | OUTPATIENT
Start: 2019-12-06 | End: 2019-12-06 | Stop reason: HOSPADM

## 2019-12-06 RX ORDER — ACETAMINOPHEN 500 MG
1000 TABLET ORAL EVERY 6 HOURS PRN
COMMUNITY
End: 2022-05-23

## 2019-12-06 RX ORDER — BUPIVACAINE HYDROCHLORIDE 5 MG/ML
INJECTION, SOLUTION PERINEURAL PRN
Status: DISCONTINUED | OUTPATIENT
Start: 2019-12-06 | End: 2019-12-06 | Stop reason: HOSPADM

## 2019-12-06 RX ORDER — IBUPROFEN 600 MG/1
600 TABLET, FILM COATED ORAL
Status: DISCONTINUED | OUTPATIENT
Start: 2019-12-06 | End: 2019-12-06 | Stop reason: HOSPADM

## 2019-12-06 RX ORDER — OXYCODONE HYDROCHLORIDE 5 MG/1
5 TABLET ORAL EVERY 6 HOURS PRN
Qty: 16 TABLET | Refills: 0 | Status: SHIPPED | OUTPATIENT
Start: 2019-12-06 | End: 2019-12-26

## 2019-12-06 RX ORDER — PHYSOSTIGMINE SALICYLATE 1 MG/ML
1.2 INJECTION INTRAVENOUS
Status: DISCONTINUED | OUTPATIENT
Start: 2019-12-06 | End: 2019-12-06 | Stop reason: HOSPADM

## 2019-12-06 RX ORDER — PROPOFOL 10 MG/ML
INJECTION, EMULSION INTRAVENOUS CONTINUOUS PRN
Status: DISCONTINUED | OUTPATIENT
Start: 2019-12-06 | End: 2019-12-06

## 2019-12-06 RX ORDER — CEFAZOLIN SODIUM 1 G/3ML
1 INJECTION, POWDER, FOR SOLUTION INTRAMUSCULAR; INTRAVENOUS SEE ADMIN INSTRUCTIONS
Status: DISCONTINUED | OUTPATIENT
Start: 2019-12-06 | End: 2019-12-06 | Stop reason: HOSPADM

## 2019-12-06 RX ORDER — HYDRALAZINE HYDROCHLORIDE 20 MG/ML
2.5-5 INJECTION INTRAMUSCULAR; INTRAVENOUS EVERY 10 MIN PRN
Status: DISCONTINUED | OUTPATIENT
Start: 2019-12-06 | End: 2019-12-06 | Stop reason: HOSPADM

## 2019-12-06 RX ORDER — LIDOCAINE 40 MG/G
CREAM TOPICAL
Status: DISCONTINUED | OUTPATIENT
Start: 2019-12-06 | End: 2019-12-06 | Stop reason: HOSPADM

## 2019-12-06 RX ORDER — METOPROLOL TARTRATE 1 MG/ML
1-2 INJECTION, SOLUTION INTRAVENOUS EVERY 5 MIN PRN
Status: DISCONTINUED | OUTPATIENT
Start: 2019-12-06 | End: 2019-12-06 | Stop reason: HOSPADM

## 2019-12-06 RX ORDER — LIDOCAINE HYDROCHLORIDE 20 MG/ML
INJECTION, SOLUTION INFILTRATION; PERINEURAL PRN
Status: DISCONTINUED | OUTPATIENT
Start: 2019-12-06 | End: 2019-12-06

## 2019-12-06 RX ORDER — HYDROXYZINE PAMOATE 25 MG/1
25 CAPSULE ORAL EVERY 6 HOURS PRN
Qty: 30 CAPSULE | Refills: 0 | Status: SHIPPED | OUTPATIENT
Start: 2019-12-06 | End: 2022-04-13

## 2019-12-06 RX ADMIN — PROPOFOL 20 MG: 10 INJECTION, EMULSION INTRAVENOUS at 12:13

## 2019-12-06 RX ADMIN — PROPOFOL 75 MCG/KG/MIN: 10 INJECTION, EMULSION INTRAVENOUS at 11:15

## 2019-12-06 RX ADMIN — ONDANSETRON 4 MG: 2 INJECTION INTRAMUSCULAR; INTRAVENOUS at 12:30

## 2019-12-06 RX ADMIN — PROPOFOL 190 MG: 10 INJECTION, EMULSION INTRAVENOUS at 11:10

## 2019-12-06 RX ADMIN — MIDAZOLAM 2 MG: 1 INJECTION INTRAMUSCULAR; INTRAVENOUS at 11:05

## 2019-12-06 RX ADMIN — HYDROMORPHONE HYDROCHLORIDE 0.3 MG: 1 INJECTION, SOLUTION INTRAMUSCULAR; INTRAVENOUS; SUBCUTANEOUS at 13:14

## 2019-12-06 RX ADMIN — FENTANYL CITRATE 25 MCG: 50 INJECTION, SOLUTION INTRAMUSCULAR; INTRAVENOUS at 11:10

## 2019-12-06 RX ADMIN — GABAPENTIN 300 MG: 300 CAPSULE ORAL at 09:24

## 2019-12-06 RX ADMIN — FENTANYL CITRATE 50 MCG: 50 INJECTION, SOLUTION INTRAMUSCULAR; INTRAVENOUS at 13:03

## 2019-12-06 RX ADMIN — SODIUM CHLORIDE, POTASSIUM CHLORIDE, SODIUM LACTATE AND CALCIUM CHLORIDE: 600; 310; 30; 20 INJECTION, SOLUTION INTRAVENOUS at 11:05

## 2019-12-06 RX ADMIN — HYDROMORPHONE HYDROCHLORIDE 0.4 MG: 1 INJECTION, SOLUTION INTRAMUSCULAR; INTRAVENOUS; SUBCUTANEOUS at 13:33

## 2019-12-06 RX ADMIN — FENTANYL CITRATE 25 MCG: 50 INJECTION, SOLUTION INTRAMUSCULAR; INTRAVENOUS at 12:53

## 2019-12-06 RX ADMIN — SODIUM CHLORIDE, POTASSIUM CHLORIDE, SODIUM LACTATE AND CALCIUM CHLORIDE: 600; 310; 30; 20 INJECTION, SOLUTION INTRAVENOUS at 12:24

## 2019-12-06 RX ADMIN — CEFAZOLIN SODIUM 2 G: 2 INJECTION, SOLUTION INTRAVENOUS at 11:20

## 2019-12-06 RX ADMIN — FENTANYL CITRATE 25 MCG: 50 INJECTION, SOLUTION INTRAMUSCULAR; INTRAVENOUS at 11:39

## 2019-12-06 RX ADMIN — OXYCODONE HYDROCHLORIDE 5 MG: 5 TABLET ORAL at 13:36

## 2019-12-06 RX ADMIN — LIDOCAINE HYDROCHLORIDE 40 MG: 20 INJECTION, SOLUTION INFILTRATION; PERINEURAL at 11:10

## 2019-12-06 RX ADMIN — PROPOFOL 40 MG: 10 INJECTION, EMULSION INTRAVENOUS at 11:36

## 2019-12-06 RX ADMIN — DEXAMETHASONE SODIUM PHOSPHATE 4 MG: 4 INJECTION, SOLUTION INTRAMUSCULAR; INTRAVENOUS at 11:21

## 2019-12-06 ASSESSMENT — MIFFLIN-ST. JEOR: SCORE: 1365.63

## 2019-12-06 NOTE — ANESTHESIA POSTPROCEDURE EVALUATION
Anesthesia POST Procedure Evaluation    Patient: Reyna Martinez   MRN:     [id number] Gender:   female   Age:    29 year old :      1990        Preoperative Diagnosis: Mass of toe [R22.40]   Procedure(s):  Excision of mass Right great toe  Right toe biologics dressing   Postop Comments: No value filed.       Anesthesia Type:  Not documented  General    Reportable Event: NO     PAIN: Uncomplicated   Sign Out status: Comfortable, Well controlled pain     PONV: No PONV   Sign Out status:  No Nausea or Vomiting     Neuro/Psych: Uneventful perioperative course   Sign Out Status: Preoperative baseline; Age appropriate mentation     Airway/Resp.: Uneventful perioperative course   Sign Out Status: Non labored breathing, age appropriate RR; Resp. Status within EXPECTED Parameters     CV: Uneventful perioperative course   Sign Out status: Appropriate BP and perfusion indices; Appropriate HR/Rhythm     Disposition:   Sign Out in:  PACU  Disposition:  Phase II; Home  Recovery Course: Uneventful  Follow-Up: Not required           Last Anesthesia Record Vitals:  CRNA VITALS  2019 1212 - 2019 1312      2019             Pulse:  56    SpO2:  99 %          Last PACU Vitals:  Vitals Value Taken Time   BP 93/60 2019  2:00 PM   Temp 36.4  C (97.5  F) 2019  1:30 PM   Pulse 61 2019  2:00 PM   Resp 34 2019  2:12 PM   SpO2 98 % 2019  2:13 PM   Temp src     NIBP     Pulse     SpO2     Resp     Temp     Ht Rate     Temp 2     Vitals shown include unvalidated device data.      Electronically Signed By: Mario Alberto Ge MD, 2019, 2:40 PM

## 2019-12-06 NOTE — ANESTHESIA CARE TRANSFER NOTE
Patient: Reyna Martinez    Procedure(s):  Excision of mass Right great toe  Right toe biologics dressing    Diagnosis: Mass of toe [R22.40]  Diagnosis Additional Information: No value filed.    Anesthesia Type:   General     Note:  Airway :Face Mask  Patient transferred to:PACU  Comments: T 36.4  HR 86  RR 14  SAO2 100%  /76Handoff Report: Identifed the Patient, Identified the Reponsible Provider, Reviewed the pertinent medical history, Discussed the surgical course, Reviewed Intra-OP anesthesia mangement and issues during anesthesia, Set expectations for post-procedure period and Allowed opportunity for questions and acknowledgement of understanding      Vitals: (Last set prior to Anesthesia Care Transfer)    CRNA VITALS  12/6/2019 1212 - 12/6/2019 1254      12/6/2019             Pulse:  56    SpO2:  99 %                Electronically Signed By: TENZIN Coburn CRNA  December 6, 2019  12:54 PM

## 2019-12-06 NOTE — OR NURSING
Pt met phase 1 discharge criteria at 1343, called Dr. Ge for signout. Report given and care transferred to Sol Quevedo RN at 1415.

## 2019-12-06 NOTE — OP NOTE
[facility], [place]    Orthopaedic Surgery  Operative Note    Pre-operative diagnosis: Mass of toe [R22.40]   Post-operative diagnosis * No post-op diagnosis entered *   Procedure:  En bloc marginal excision of recurrent benign fibrous histiocytoma of right great toe dorsum, 2 cm superficial   Surgeon: Raoul Seals MD   Assistants(s): Renato Tinoco MD   Anesthesia: General    Estimated blood loss: None   Total IV fluids: (See anesthesia record)   Blood transfusion: (See anesthesia record)   Total urine output: (See anesthesia record)   Drains: None   Specimens: ID Type Source Tests Collected by Time Destination   A : Right Great Toe Anterior Mass Tissue Toe SURGICAL PATHOLOGY EXAM Raoul Seals MD 12/6/2019 11:50 AM       Findings:                              Complications: None   Implants: None     Procedure:  Patient was placed on operative table supine position.  After induction of general anesthesia the right lower extremitie prepped and draped in sterile manner.  A tourniquet was inflated after elevation of the limb, to 250 mmHg.  A circular incision was now made encircling the recurrent mass on the dorsum of her right great toe.  The site was taken down through the subcutaneous tissues and the bleeding points were cauterized.  I then resected deep to the paratenon tendon sheath of the extensor hallucis longus tendon.  The plane of dissection was taken just along  the dorsum of the tendon and then deep in the soft tissue alongside the medial lateral border of the great toe proximal phalanx.  Margins were grossly negative upon inspection.  They were inked with a colored paints.  At this point the procedure was turned over to Dr. Renato Tinoco of our plastic surgery service for soft tissue reconstruction as planned preoperatively.    Raoul Seals MD  [facility] Family Professor  Oncology and Adult Reconstructive Surgery  Dept Orthopaedic Surgery, [facility]  Physicians  [phone] office, [phone] pager  [email or web address]

## 2019-12-06 NOTE — ANESTHESIA PREPROCEDURE EVALUATION
Anesthesia Pre-Procedure Evaluation    Patient: Reyna Martinez   MRN:     [id number] Gender:   female   Age:    29 year old :      1990        Preoperative Diagnosis: Mass of toe [R22.40]   Procedure(s):  Excision of mass Right great toe  Right Great Toe Possible Pinning  Right toe split thickness skin graft, possible biologics dressing     History reviewed. No pertinent past medical history.   History reviewed. No pertinent surgical history.       Anesthesia Evaluation     . Pt has not had prior anesthetic            ROS/MED HX    ENT/Pulmonary:  - neg pulmonary ROS     Neurologic:  - neg neurologic ROS     Cardiovascular:  - neg cardiovascular ROS       METS/Exercise Tolerance:     Hematologic:  - neg hematologic  ROS       Musculoskeletal: Comment: Toe tumor        GI/Hepatic:  - neg GI/hepatic ROS       Renal/Genitourinary:  - ROS Renal section negative       Endo:  - neg endo ROS       Psychiatric:  - neg psychiatric ROS       Infectious Disease:  - neg infectious disease ROS       Malignancy:      - no malignancy   Other:    - neg other ROS                     PHYSICAL EXAM:   Mental Status/Neuro: A/A/O   Airway: Facies: Feasible  Mallampati: I  Mouth/Opening: Full  TM distance: > 6 cm  Neck ROM: Full   Respiratory: Auscultation: CTAB     Resp. Rate: Normal     Resp. Effort: Normal      CV: Rhythm: Regular  Rate: Age appropriate  Heart: Normal Sounds  Edema: None   Comments:      Dental: Normal Dentition                LABS:  CBC:   Lab Results   Component Value Date    HGB 13.4 2019     BMP:   Lab Results   Component Value Date    GLC 88 2019     COAGS: No results found for: PTT, INR, FIBR  POC:   Lab Results   Component Value Date    HCG Negative 2019     OTHER: No results found for: PH, LACT, A1C, DAVIDA, PHOS, MAG, ALBUMIN, PROTTOTAL, ALT, AST, GGT, ALKPHOS, BILITOTAL, BILIDIRECT, LIPASE, AMYLASE, MERRICK, TSH, T4, T3, CRP, SED     Preop Vitals    BP Readings from Last 3 Encounters:  "  12/06/19 (P) 111/74    Pulse Readings from Last 3 Encounters:   12/06/19 (P) 69      Resp Readings from Last 3 Encounters:   12/06/19 (P) 18    SpO2 Readings from Last 3 Encounters:   12/06/19 (P) 100%      Temp Readings from Last 1 Encounters:   12/06/19 (P) 36.5  C (97.7  F) ((P) Oral)    Ht Readings from Last 1 Encounters:   12/06/19 1.702 m (5' 7\")      Wt Readings from Last 1 Encounters:   12/06/19 61.8 kg (136 lb 3.9 oz)    Estimated body mass index is 21.34 kg/m  as calculated from the following:    Height as of this encounter: 1.702 m (5' 7\").    Weight as of this encounter: 61.8 kg (136 lb 3.9 oz).     LDA:        Assessment:   ASA SCORE: 1    H&P: History and physical reviewed and following examination; no interval change.   Smoking Status:  Non-Smoker/Unknown   NPO Status: NPO Appropriate     Plan:   Anes. Type:  General   Pre-Medication: None   Induction:  IV (Standard)   Airway: LMA   Access/Monitoring: PIV   Maintenance: Balanced     Postop Plan:   Postop Pain: Opioids  Postop Sedation/Airway: Not planned  Disposition: Outpatient     PONV Management:   Adult Risk Factors: Female, Non-Smoker, Postop Opioids   Prevention: Ondansetron, Dexamethasone, Propofol     CONSENT: Direct conversation   Plan and risks discussed with: Patient   Blood Products: Consent Deferred (Minimal Blood Loss)                   Mario Alberto Ge MD  "

## 2019-12-06 NOTE — LETTER
Return to Work  2019     Seen today: yes    Patient:  Reyna Martinez  :   1990  MRN:     [id number]  Physician: Data Unavailable    Reyna Martinez may return to work with the restrictions below.    She is having foot surgery and will need to either use crutches, an orthosis or a kneeling scooter/wheelchair to keep her foot elevated for up to 3 months depending upon how her wound heals.      Patient limitations:  As above      Electronically signed by :    MD Baylee Wakefield Family Professor  Oncology and Adult Reconstructive Surgery  Dept Orthopaedic Surgery, [facility]  [phone] office, [phone] pager  [email or web address]\

## 2019-12-06 NOTE — OP NOTE
DATE OF OPERATION: December 6, 2019    PREOPERATIVE DIAGNOSIS: Right great toe recurrent fibrous histiocytoma.    POSTOPERATIVE DIAGNOSIS: Right great toe recurrent fibrous histiocytoma excision wound, size 2.5 x 2.5 cm with exposed tendon without peritenon.    PROCEDURES PERFORMED: Coverage of right great toe wound with Integra biologic dressing, size 2.5 x 2.5 cm.    SURGEON: Renato Tinoco MD    ASSISTANT: None.    ANESTHESIA: Monitored anesthesia care with local.    ESTIMATED BLOOD LOSS: 1 mL    TOURNIQUET TIME: 46 min    FINDINGS: Exposed extensor tendon without peritenon in wound.    SPECIMENS: None for plastic surgery portion.    COMPLICATIONS: None.    DRAINS: None.    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  Reexcision was planned.  Plastic surgery was consulted for possible assistance and coverage.  Risks benefits alternatives were discussed with the patient preoperatively regarding different options of coverage including biologic slings.  Patient accepted the associated risks and wished to proceed.    DESCRIPTION OF PROCEDURE: Informed consent was obtained in the preoperative holding area.  Patient was then taken to the operating room and placed in supine position.  All pressure points were well-padded.  Left lower leg SCDs were placed.  Preoperative antibiotics were given.  Sedation was achieved.  The right lower extremity was then prepped and draped circumferentially in a sterile fashion.  A timeout was performed.    A great toe block was performed using 10 cc of 1% Marcaine.  Was then examined using an Esmarch and the tourniquet at the upper thigh was brought to 300.  First there was a surgery team performed a excision.  Given that the tumor was abutting the extensor tendon, the superficial layer of the extensor tendon had to be removed as well.  The case was then turned over to the plastic surgery team.  The wound measured  2.5 x 2.5 cm with exposed tendon without peritenon.  Skin grafting cannot be performed.  Given the small wound size, awkward nature of the wound being on the dorsal great toe phalanx adjacent to the webspace, and concern for contaminating donor sites, decision was made to use a biologic dressing.    The wound was thoroughly irrigated with saline.  A 2.5 x 2.5 cm Integra dressing was rinsed in saline and placed over the wound.  This was sewn in and bolster down using a silver sponge dressing using 4-0 chromic sutures as tied over.  Tourniquet was brought down and tourniquet time was 46 minutes total.  Patient was then dressed with a soft dressing and placed into a boot.  All counts are correct at the end of the case.  Patient was then awakened and transferred to the postoperative area in stable condition.    DISPOSITION: Home.

## 2019-12-06 NOTE — DISCHARGE INSTRUCTIONS
Same-Day Surgery   Adult Discharge Orders & Instructions     For 24 hours after surgery:  1. Get plenty of rest.  A responsible adult must stay with you for at least 24 hours after you leave the hospital.   2. Pain medication can slow your reflexes. Do not drive or use heavy equipment.  If you have weakness or tingling, don't drive or use heavy equipment until this feeling goes away.  3. Mixing alcohol and pain medication can cause dizziness and slow your breathing. It can even be fatal. Do not drink alcohol while taking pain medication.  4. Avoid strenuous or risky activities.  Ask for help when climbing stairs.   5. You may feel lightheaded.  If so, sit for a few minutes before standing.  Have someone help you get up.   6. If you have nausea (feel sick to your stomach), drink only clear liquids such as apple juice, ginger ale, broth or 7-Up.  Rest may also help.  Be sure to drink enough fluids.  Move to a regular diet as you feel able. Take pain medications with a small amount of solid food, such as toast or crackers, to avoid nausea.   7. A slight fever is normal. Call the doctor if your fever is over 100 F (37.7 C) (taken under the tongue) or lasts longer than 24 hours.  8. You may have a dry mouth, muscle aches, trouble sleeping or a sore throat.  These symptoms should go away after 24 hours.  9. Do not make important or legal decisions.   Pain Management:      1. Take pain medication (if prescribed) for pain as directed by your physician.        2. WARNING: If the pain medication you have been prescribed contains Tylenol  (acetaminophen), DO NOT take additional doses of Tylenol (acetaminophen).     Call your doctor for any of the followin.  Signs of infection (fever, growing tenderness at the surgery site, severe pain, a large amount of drainage or bleeding, foul-smelling drainage, redness, swelling).    2.  It has been over 8 to 10 hours since surgery and you are still not able to urinate (pee).    3.   "Headache for over 24 hours.    4.  Numbness, tingling or weakness the day after surgery (if you had spinal anesthesia).  To contact a doctor, call _____________________________________ or:      [phone] and ask for the Resident On Call for:          __________________________________________ (answered 24 hours a day)      Emergency Department:  [place] [facility]: [phone]  [place] [facility]: [phone]               Rev. 10/2014   Safety Tips for Using Crutches    Crutch Fit:    Assume good standing posture with shoulders relaxed and crutch tips 6-8 inches out from the side of the foot.    The underarm pad should fall 2-3 fingers width below the armpit.    The handgrip is positioned level with the wrist to allow 30  flexion at the elbow.    Safety Tips:    Bear weight on your hands, not on your armpits.    Do not add extra padding to the underarm pad. This will, in effect, lengthen the crutches and increase risk of nerve injury.    Wear flat, properly fitting shoes. Do not walk in stocking feet, high heels or slippers.    Household hazards:  --Throw rugs should be removed from floors.  --Stairs should be cleared of obstacles.  --Use extra caution on slippery, highly polished, littered or uneven floor surfaces.  --Check for electric cords.    Check crutch tips for excessive wear and keep wing nuts tight.    While walking, look forward with  head up  and  eyes open.  Take equal length steps.    Use BOTH crutches.    Stairs Sequence:    UP: \"Good\" leg first, followed by  bad  leg, then crutches.    DOWN: Crutches, followed by  bad  leg, \"good\" leg.     Walking with Crutches:    Move both crutches forward at the same time.    Non-Weight Bearing (NWB):  Hold the involved leg up and swing through the crutches with the involved leg. The involved leg does not touch the floor.    Toe Touch Weight Bearing (TTWB): Move the involved leg forward. Rest it lightly on the floor for balance only. " Step through the crutches with the uninvolved leg.    Partial Weight Bearing (PWB): Move the involved leg forward. Step down the weight of the leg only.  Step through the crutches with the uninvolved leg.    Weight Bearing As Tolerated (WBAT): Move the involved leg forward. Put as much pressure through the involved leg as you can tolerate comfortably. Then step through the crutches with the uninvolved leg.    Rev. 4/2014

## 2019-12-11 LAB — COPATH REPORT: NORMAL

## 2019-12-17 ENCOUNTER — OFFICE VISIT (OUTPATIENT)
Dept: ORTHOPEDICS | Facility: CLINIC | Age: 29
End: 2019-12-17
Payer: COMMERCIAL

## 2019-12-17 DIAGNOSIS — D21.21: Primary | ICD-10-CM

## 2019-12-17 NOTE — LETTER
12/17/2019       RE: Reyna Martinez  [id number] [address] [address]     Dear Colleague,    Thank you for referring your patient, Reyna Martinez, to the [facility] at [facility]. Please see a copy of my visit note below.    Patient returns for a postoperative visit following right great toe tumor excision and coverage with biologic dressing.    INTERVAL HISTORY: Pain is well controlled. Patient has been keeping the right foot elevated as much as possible. Denies any numbness or tingling.    PHYSICAL EXAMINATION:  Right great toe Integra dressing appears pink and well vascularized. Overlying bolster dressing is intact. No sign of infection such as erythema or drainage.    PATHOLOGY:   SPECIMEN(S):   Soft tissue mass, right great toe anterior     FINAL DIAGNOSIS:   Soft tissue, right great toe anterior, excision:   - Cellular dermatofibroma   - Tumor is present at the deep margin   - See comment     ASSESSMENT: 11 days status post right great toe dermatofibroma excision and coverage of wound with Integra.    PLAN: Acticoat dressing changes taught and started. Patient is to remain in boot and avoid weight bearing through the operative toe.  Neck stage operation will include sharp debridement of the Integra dressing and full-thickness skin graft from the right lateral groin.  I explained this outpatient procedure in detail today.  Given that an additional layer of superficial tendon was excised at the time of tumor excision, no further reexcision may be necessary.  I explained the risks with surgery to include bleeding, infection, injury to surrounding structures, graft loss, wound healing difficulties, change in gait, recurrence of tumor, wound healing difficulties, and need for revision surgery.  Patient accepts the associated risks and wishes to proceed.    Total time spent with patient was 15 min of which greater than 50% was in counseling.    Answers for  HPI/ROS submitted by the patient on 12/13/2019   General Symptoms: No  Skin Symptoms: No  HENT Symptoms: No  EYE SYMPTOMS: No  HEART SYMPTOMS: No  LUNG SYMPTOMS: No  INTESTINAL SYMPTOMS: No  URINARY SYMPTOMS: No  GYNECOLOGIC SYMPTOMS: No  BREAST SYMPTOMS: No  SKELETAL SYMPTOMS: No  BLOOD SYMPTOMS: No  NERVOUS SYSTEM SYMPTOMS: No  MENTAL HEALTH SYMPTOMS: No      Renato Tinoco MD

## 2019-12-17 NOTE — NURSING NOTE
Reason For Visit:   Chief Complaint   Patient presents with     RECHECK     Right big toe mass       Primary MD: Morelia Atkinson    Age: 29 year old    ?  No      LMP 12/02/2019       Pain Assessment  Patient Currently in Pain: Yes  0-10 Pain Scale: 4  Primary Pain Location: (Right great toe)               QuickDASH Assessment  No flowsheet data found.       Current Outpatient Medications   Medication Sig Dispense Refill     acetaminophen (TYLENOL) 500 MG tablet Take 1,000 mg by mouth every 6 hours as needed for mild pain       etonogestrel-ethinyl estradiol (NUVARING) 0.12-0.015 MG/24HR vaginal ring Place vaginally every 28 days       hydrOXYzine (VISTARIL) 25 MG capsule Take 1 capsule (25 mg) by mouth every 6 hours as needed for itching 30 capsule 0     Misc. Devices (ROLLER WALKER) MISC 1 Device as needed (For ambulation while avoiding weight bearing through right toes) 1 each 0     vitamin D3 (D3-50) 55480 units capsule Take 50,000 Units by mouth       oxyCODONE (ROXICODONE) 5 MG tablet Take 1 tablet (5 mg) by mouth every 6 hours as needed for moderate to severe pain (Patient not taking: Reported on 12/17/2019) 16 tablet 0       No Known Allergies    Sweta Krueger, ATC

## 2019-12-17 NOTE — PROGRESS NOTES
Patient returns for a postoperative visit following right great toe tumor excision and coverage with biologic dressing.    INTERVAL HISTORY: Pain is well controlled. Patient has been keeping the right foot elevated as much as possible. Denies any numbness or tingling.    PHYSICAL EXAMINATION:  Right great toe Integra dressing appears pink and well vascularized. Overlying bolster dressing is intact. No sign of infection such as erythema or drainage.    PATHOLOGY:   SPECIMEN(S):   Soft tissue mass, right great toe anterior     FINAL DIAGNOSIS:   Soft tissue, right great toe anterior, excision:   - Cellular dermatofibroma   - Tumor is present at the deep margin   - See comment     ASSESSMENT: 11 days status post right great toe dermatofibroma excision and coverage of wound with Integra.    PLAN: Acticoat dressing changes taught and started. Patient is to remain in boot and avoid weight bearing through the operative toe.  Neck stage operation will include sharp debridement of the Integra dressing and full-thickness skin graft from the right lateral groin.  I explained this outpatient procedure in detail today.  Given that an additional layer of superficial tendon was excised at the time of tumor excision, no further reexcision may be necessary.  I explained the risks with surgery to include bleeding, infection, injury to surrounding structures, graft loss, wound healing difficulties, change in gait, recurrence of tumor, wound healing difficulties, and need for revision surgery.  Patient accepts the associated risks and wishes to proceed.    Total time spent with patient was 15 min of which greater than 50% was in counseling.    Answers for HPI/ROS submitted by the patient on 12/13/2019   General Symptoms: No  Skin Symptoms: No  HENT Symptoms: No  EYE SYMPTOMS: No  HEART SYMPTOMS: No  LUNG SYMPTOMS: No  INTESTINAL SYMPTOMS: No  URINARY SYMPTOMS: No  GYNECOLOGIC SYMPTOMS: No  BREAST SYMPTOMS: No  SKELETAL SYMPTOMS:  No  BLOOD SYMPTOMS: No  NERVOUS SYSTEM SYMPTOMS: No  MENTAL HEALTH SYMPTOMS: No

## 2019-12-19 ENCOUNTER — TRANSCRIBE ORDERS (OUTPATIENT)
Dept: OTHER | Age: 29
End: 2019-12-19

## 2019-12-19 DIAGNOSIS — D23.9 FIBROUS HISTIOCYTOMA: Primary | ICD-10-CM

## 2019-12-19 RX ORDER — CEFAZOLIN SODIUM 1 G/50ML
1 INJECTION, SOLUTION INTRAVENOUS SEE ADMIN INSTRUCTIONS
Status: CANCELLED | OUTPATIENT
Start: 2019-12-19

## 2019-12-19 RX ORDER — CEFAZOLIN SODIUM 2 G/50ML
2 SOLUTION INTRAVENOUS
Status: CANCELLED | OUTPATIENT
Start: 2019-12-19

## 2019-12-20 ENCOUNTER — TELEPHONE (OUTPATIENT)
Dept: ORTHOPEDICS | Facility: CLINIC | Age: 29
End: 2019-12-20

## 2019-12-20 NOTE — TELEPHONE ENCOUNTER
Voicemail left, patient is scheduled for surgery with Dr Renato Tinoco    Surgery was scheduled on 12/30 at [facility] OR    Patient will have Pre-Op with NA (already completed per notes    Post-Op care appointment scheduled?  YES on 1/14    Detailed message left that a / is needed day of surgery.     Surgery packet was sent via [employer], left my direct contact information for any further questions.     Magda Ramírez  Surgical [place]-Op Coordinator  [phone]

## 2019-12-20 NOTE — TELEPHONE ENCOUNTER
Attending note:    I phoned Reyna to discuss her pathology report.  There is a definite risk of recurrence that I mentioned to her.  Unfortunately, I don't think I can reduce the risk of recurrence unless we perform a toe amputation.  She is not in favor of this and I would concur.  If relapse occurs, repeat excision, as possible, would be preferable and if not possible, amputation would then be considered as needed.    Raoul Seals MD  [facility] Family Professor  Oncology and Adult Reconstructive Surgery  Dept Orthopaedic Surgery, [facility]  [phone] office, [phone] pager  [email or web address]

## 2019-12-26 NOTE — OR NURSING
Pt needs update to H&P on DOS   Received: Today   Message Contents   Jessica Zaragoza, Magda Hinojosa; Lauren Cisneros RN Hi Mary and Danielle,     Pt's H&P was done on  and will be  by DOS . Can Dr Tinoco update H&P on DOS?     Thanks.     Jessica Zaragoza RN, BSN   Preadmission Nursing   [phone]

## 2019-12-27 ENCOUNTER — ANESTHESIA EVENT (OUTPATIENT)
Dept: SURGERY | Facility: CLINIC | Age: 29
End: 2019-12-27
Payer: COMMERCIAL

## 2019-12-30 ENCOUNTER — HOSPITAL ENCOUNTER (OUTPATIENT)
Facility: CLINIC | Age: 29
Discharge: HOME | End: 2019-12-30
Attending: PLASTIC SURGERY | Admitting: PLASTIC SURGERY
Payer: COMMERCIAL

## 2019-12-30 ENCOUNTER — ANESTHESIA (OUTPATIENT)
Dept: SURGERY | Facility: CLINIC | Age: 29
End: 2019-12-30
Payer: COMMERCIAL

## 2019-12-30 VITALS
SYSTOLIC BLOOD PRESSURE: 101 MMHG | BODY MASS INDEX: 22.15 KG/M2 | OXYGEN SATURATION: 100 % | HEART RATE: 69 BPM | HEIGHT: 67 IN | RESPIRATION RATE: 16 BRPM | TEMPERATURE: 98 F | WEIGHT: 141.09 LBS | DIASTOLIC BLOOD PRESSURE: 63 MMHG

## 2019-12-30 DIAGNOSIS — D21.21: ICD-10-CM

## 2019-12-30 LAB
GLUCOSE BLDC GLUCOMTR-MCNC: 72 MG/DL (ref 70–99)
HCG UR QL: NEGATIVE

## 2019-12-30 PROCEDURE — 40000170 ZZH STATISTIC PRE-PROCEDURE ASSESSMENT II: Performed by: PLASTIC SURGERY

## 2019-12-30 PROCEDURE — 25000128 H RX IP 250 OP 636: Performed by: NURSE ANESTHETIST, CERTIFIED REGISTERED

## 2019-12-30 PROCEDURE — 81025 URINE PREGNANCY TEST: CPT | Performed by: ANESTHESIOLOGY

## 2019-12-30 PROCEDURE — 71000014 ZZH RECOVERY PHASE 1 LEVEL 2 FIRST HR: Performed by: PLASTIC SURGERY

## 2019-12-30 PROCEDURE — 36000057 ZZH SURGERY LEVEL 3 1ST 30 MIN - UMMC: Performed by: PLASTIC SURGERY

## 2019-12-30 PROCEDURE — 25000125 ZZHC RX 250: Performed by: PLASTIC SURGERY

## 2019-12-30 PROCEDURE — 27210794 ZZH OR GENERAL SUPPLY STERILE: Performed by: PLASTIC SURGERY

## 2019-12-30 PROCEDURE — 37000008 ZZH ANESTHESIA TECHNICAL FEE, 1ST 30 MIN: Performed by: PLASTIC SURGERY

## 2019-12-30 PROCEDURE — 25000128 H RX IP 250 OP 636: Performed by: PLASTIC SURGERY

## 2019-12-30 PROCEDURE — 25000125 ZZHC RX 250: Performed by: NURSE ANESTHETIST, CERTIFIED REGISTERED

## 2019-12-30 PROCEDURE — 71000027 ZZH RECOVERY PHASE 2 EACH 15 MINS: Performed by: PLASTIC SURGERY

## 2019-12-30 PROCEDURE — 25800030 ZZH RX IP 258 OP 636: Performed by: ANESTHESIOLOGY

## 2019-12-30 PROCEDURE — 25800030 ZZH RX IP 258 OP 636: Performed by: NURSE ANESTHETIST, CERTIFIED REGISTERED

## 2019-12-30 PROCEDURE — 36000059 ZZH SURGERY LEVEL 3 EA 15 ADDTL MIN UMMC: Performed by: PLASTIC SURGERY

## 2019-12-30 PROCEDURE — 25000128 H RX IP 250 OP 636: Performed by: ANESTHESIOLOGY

## 2019-12-30 PROCEDURE — 25000132 ZZH RX MED GY IP 250 OP 250 PS 637: Performed by: PLASTIC SURGERY

## 2019-12-30 PROCEDURE — 37000009 ZZH ANESTHESIA TECHNICAL FEE, EACH ADDTL 15 MIN: Performed by: PLASTIC SURGERY

## 2019-12-30 PROCEDURE — 82962 GLUCOSE BLOOD TEST: CPT

## 2019-12-30 RX ORDER — PROPOFOL 10 MG/ML
INJECTION, EMULSION INTRAVENOUS CONTINUOUS PRN
Status: DISCONTINUED | OUTPATIENT
Start: 2019-12-30 | End: 2019-12-30

## 2019-12-30 RX ORDER — ONDANSETRON 2 MG/ML
4 INJECTION INTRAMUSCULAR; INTRAVENOUS EVERY 30 MIN PRN
Status: DISCONTINUED | OUTPATIENT
Start: 2019-12-30 | End: 2019-12-30 | Stop reason: HOSPADM

## 2019-12-30 RX ORDER — ONDANSETRON 4 MG/1
4 TABLET, ORALLY DISINTEGRATING ORAL EVERY 30 MIN PRN
Status: DISCONTINUED | OUTPATIENT
Start: 2019-12-30 | End: 2019-12-30 | Stop reason: HOSPADM

## 2019-12-30 RX ORDER — PROPOFOL 10 MG/ML
INJECTION, EMULSION INTRAVENOUS PRN
Status: DISCONTINUED | OUTPATIENT
Start: 2019-12-30 | End: 2019-12-30

## 2019-12-30 RX ORDER — FENTANYL CITRATE 50 UG/ML
INJECTION, SOLUTION INTRAMUSCULAR; INTRAVENOUS PRN
Status: DISCONTINUED | OUTPATIENT
Start: 2019-12-30 | End: 2019-12-30

## 2019-12-30 RX ORDER — ONDANSETRON 2 MG/ML
INJECTION INTRAMUSCULAR; INTRAVENOUS PRN
Status: DISCONTINUED | OUTPATIENT
Start: 2019-12-30 | End: 2019-12-30

## 2019-12-30 RX ORDER — EPHEDRINE SULFATE 50 MG/ML
INJECTION, SOLUTION INTRAMUSCULAR; INTRAVENOUS; SUBCUTANEOUS PRN
Status: DISCONTINUED | OUTPATIENT
Start: 2019-12-30 | End: 2019-12-30

## 2019-12-30 RX ORDER — ACETAMINOPHEN 325 MG/1
650 TABLET ORAL
Status: DISCONTINUED | OUTPATIENT
Start: 2019-12-30 | End: 2019-12-30 | Stop reason: HOSPADM

## 2019-12-30 RX ORDER — LIDOCAINE HYDROCHLORIDE 20 MG/ML
INJECTION, SOLUTION INFILTRATION; PERINEURAL PRN
Status: DISCONTINUED | OUTPATIENT
Start: 2019-12-30 | End: 2019-12-30

## 2019-12-30 RX ORDER — HYDROXYZINE PAMOATE 25 MG/1
25 CAPSULE ORAL EVERY 6 HOURS PRN
Qty: 30 CAPSULE | Refills: 0 | Status: ON HOLD | OUTPATIENT
Start: 2019-12-30 | End: 2022-05-06

## 2019-12-30 RX ORDER — ONDANSETRON 4 MG/1
4 TABLET, ORALLY DISINTEGRATING ORAL
Status: DISCONTINUED | OUTPATIENT
Start: 2019-12-30 | End: 2019-12-30 | Stop reason: HOSPADM

## 2019-12-30 RX ORDER — NALOXONE HYDROCHLORIDE 0.4 MG/ML
.1-.4 INJECTION, SOLUTION INTRAMUSCULAR; INTRAVENOUS; SUBCUTANEOUS
Status: DISCONTINUED | OUTPATIENT
Start: 2019-12-30 | End: 2019-12-30 | Stop reason: HOSPADM

## 2019-12-30 RX ORDER — LIDOCAINE 40 MG/G
CREAM TOPICAL
Status: DISCONTINUED | OUTPATIENT
Start: 2019-12-30 | End: 2019-12-30 | Stop reason: HOSPADM

## 2019-12-30 RX ORDER — LIDOCAINE HYDROCHLORIDE AND EPINEPHRINE 10; 10 MG/ML; UG/ML
INJECTION, SOLUTION INFILTRATION; PERINEURAL PRN
Status: DISCONTINUED | OUTPATIENT
Start: 2019-12-30 | End: 2019-12-30 | Stop reason: HOSPADM

## 2019-12-30 RX ORDER — CEFAZOLIN SODIUM 2 G/100ML
2 INJECTION, SOLUTION INTRAVENOUS
Status: COMPLETED | OUTPATIENT
Start: 2019-12-30 | End: 2019-12-30

## 2019-12-30 RX ORDER — HYDROMORPHONE HYDROCHLORIDE 1 MG/ML
.3-.5 INJECTION, SOLUTION INTRAMUSCULAR; INTRAVENOUS; SUBCUTANEOUS EVERY 5 MIN PRN
Status: DISCONTINUED | OUTPATIENT
Start: 2019-12-30 | End: 2019-12-30 | Stop reason: HOSPADM

## 2019-12-30 RX ORDER — SODIUM CHLORIDE, SODIUM LACTATE, POTASSIUM CHLORIDE, CALCIUM CHLORIDE 600; 310; 30; 20 MG/100ML; MG/100ML; MG/100ML; MG/100ML
INJECTION, SOLUTION INTRAVENOUS CONTINUOUS
Status: DISCONTINUED | OUTPATIENT
Start: 2019-12-30 | End: 2019-12-30 | Stop reason: HOSPADM

## 2019-12-30 RX ORDER — HYDROXYZINE HYDROCHLORIDE 25 MG/1
25 TABLET, FILM COATED ORAL
Status: DISCONTINUED | OUTPATIENT
Start: 2019-12-30 | End: 2019-12-30 | Stop reason: HOSPADM

## 2019-12-30 RX ORDER — AMOXICILLIN 250 MG
1-2 CAPSULE ORAL 2 TIMES DAILY
Qty: 30 TABLET | Refills: 0 | Status: SHIPPED | OUTPATIENT
Start: 2019-12-30 | End: 2022-04-13

## 2019-12-30 RX ORDER — CEPHALEXIN 500 MG/1
500 CAPSULE ORAL 3 TIMES DAILY
Qty: 15 CAPSULE | Refills: 0 | Status: SHIPPED | OUTPATIENT
Start: 2019-12-30 | End: 2020-01-04

## 2019-12-30 RX ORDER — OXYCODONE HYDROCHLORIDE 5 MG/1
5 TABLET ORAL
Status: COMPLETED | OUTPATIENT
Start: 2019-12-30 | End: 2019-12-30

## 2019-12-30 RX ORDER — CEFAZOLIN SODIUM 1 G/3ML
1 INJECTION, POWDER, FOR SOLUTION INTRAMUSCULAR; INTRAVENOUS SEE ADMIN INSTRUCTIONS
Status: DISCONTINUED | OUTPATIENT
Start: 2019-12-30 | End: 2019-12-30 | Stop reason: HOSPADM

## 2019-12-30 RX ORDER — OXYCODONE HYDROCHLORIDE 5 MG/1
5-10 TABLET ORAL EVERY 6 HOURS PRN
Qty: 16 TABLET | Refills: 0 | Status: ON HOLD | OUTPATIENT
Start: 2019-12-30 | End: 2022-04-15

## 2019-12-30 RX ORDER — FENTANYL CITRATE 50 UG/ML
25-50 INJECTION, SOLUTION INTRAMUSCULAR; INTRAVENOUS
Status: DISCONTINUED | OUTPATIENT
Start: 2019-12-30 | End: 2019-12-30 | Stop reason: HOSPADM

## 2019-12-30 RX ORDER — SODIUM CHLORIDE, SODIUM LACTATE, POTASSIUM CHLORIDE, CALCIUM CHLORIDE 600; 310; 30; 20 MG/100ML; MG/100ML; MG/100ML; MG/100ML
INJECTION, SOLUTION INTRAVENOUS CONTINUOUS PRN
Status: DISCONTINUED | OUTPATIENT
Start: 2019-12-30 | End: 2019-12-30

## 2019-12-30 RX ORDER — DEXAMETHASONE SODIUM PHOSPHATE 4 MG/ML
INJECTION, SOLUTION INTRA-ARTICULAR; INTRALESIONAL; INTRAMUSCULAR; INTRAVENOUS; SOFT TISSUE PRN
Status: DISCONTINUED | OUTPATIENT
Start: 2019-12-30 | End: 2019-12-30

## 2019-12-30 RX ADMIN — HYDROMORPHONE HYDROCHLORIDE 0.5 MG: 1 INJECTION, SOLUTION INTRAMUSCULAR; INTRAVENOUS; SUBCUTANEOUS at 13:09

## 2019-12-30 RX ADMIN — SODIUM CHLORIDE, POTASSIUM CHLORIDE, SODIUM LACTATE AND CALCIUM CHLORIDE: 600; 310; 30; 20 INJECTION, SOLUTION INTRAVENOUS at 12:29

## 2019-12-30 RX ADMIN — ONDANSETRON 4 MG: 2 INJECTION INTRAMUSCULAR; INTRAVENOUS at 14:21

## 2019-12-30 RX ADMIN — FENTANYL CITRATE 50 MCG: 50 INJECTION, SOLUTION INTRAMUSCULAR; INTRAVENOUS at 12:17

## 2019-12-30 RX ADMIN — FENTANYL CITRATE 50 MCG: 50 INJECTION, SOLUTION INTRAMUSCULAR; INTRAVENOUS at 12:00

## 2019-12-30 RX ADMIN — PHENYLEPHRINE HYDROCHLORIDE 100 MCG: 10 INJECTION INTRAVENOUS at 11:34

## 2019-12-30 RX ADMIN — FENTANYL CITRATE 50 MCG: 50 INJECTION, SOLUTION INTRAMUSCULAR; INTRAVENOUS at 11:16

## 2019-12-30 RX ADMIN — Medication 5 MG: at 11:46

## 2019-12-30 RX ADMIN — PHENYLEPHRINE HYDROCHLORIDE 100 MCG: 10 INJECTION INTRAVENOUS at 11:43

## 2019-12-30 RX ADMIN — CEFAZOLIN SODIUM 2 G: 2 INJECTION, SOLUTION INTRAVENOUS at 11:05

## 2019-12-30 RX ADMIN — HYDROMORPHONE HYDROCHLORIDE 0.5 MG: 1 INJECTION, SOLUTION INTRAMUSCULAR; INTRAVENOUS; SUBCUTANEOUS at 12:54

## 2019-12-30 RX ADMIN — ONDANSETRON 4 MG: 2 INJECTION INTRAMUSCULAR; INTRAVENOUS at 11:52

## 2019-12-30 RX ADMIN — HYDROMORPHONE HYDROCHLORIDE 0.2 MG: 1 INJECTION, SOLUTION INTRAMUSCULAR; INTRAVENOUS; SUBCUTANEOUS at 12:46

## 2019-12-30 RX ADMIN — LIDOCAINE HYDROCHLORIDE 100 MG: 20 INJECTION, SOLUTION INFILTRATION; PERINEURAL at 10:55

## 2019-12-30 RX ADMIN — FENTANYL CITRATE 50 MCG: 50 INJECTION, SOLUTION INTRAMUSCULAR; INTRAVENOUS at 11:04

## 2019-12-30 RX ADMIN — HYDROMORPHONE HYDROCHLORIDE 0.3 MG: 1 INJECTION, SOLUTION INTRAMUSCULAR; INTRAVENOUS; SUBCUTANEOUS at 12:34

## 2019-12-30 RX ADMIN — OXYCODONE HYDROCHLORIDE 5 MG: 5 TABLET ORAL at 14:07

## 2019-12-30 RX ADMIN — ACETAMINOPHEN 650 MG: 325 TABLET, FILM COATED ORAL at 14:09

## 2019-12-30 RX ADMIN — MIDAZOLAM 2 MG: 1 INJECTION INTRAMUSCULAR; INTRAVENOUS at 10:44

## 2019-12-30 RX ADMIN — PROPOFOL 150 MCG/KG/MIN: 10 INJECTION, EMULSION INTRAVENOUS at 10:55

## 2019-12-30 RX ADMIN — PROPOFOL 200 MG: 10 INJECTION, EMULSION INTRAVENOUS at 10:55

## 2019-12-30 RX ADMIN — DEXAMETHASONE SODIUM PHOSPHATE 6 MG: 4 INJECTION, SOLUTION INTRA-ARTICULAR; INTRALESIONAL; INTRAMUSCULAR; INTRAVENOUS; SOFT TISSUE at 11:08

## 2019-12-30 RX ADMIN — SODIUM CHLORIDE, POTASSIUM CHLORIDE, SODIUM LACTATE AND CALCIUM CHLORIDE: 600; 310; 30; 20 INJECTION, SOLUTION INTRAVENOUS at 10:44

## 2019-12-30 ASSESSMENT — MIFFLIN-ST. JEOR: SCORE: 1387.63

## 2019-12-30 NOTE — OR NURSING
BP improved with fluids, patient states that she is feeling better and ready to be discharged. Patient and patients spouse had no questions at time of discharge. PIV removed. [facility]

## 2019-12-30 NOTE — BRIEF OP NOTE
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  Complications: None.  Implants: * No implants in log *

## 2019-12-30 NOTE — PROGRESS NOTES
Preop H&P update    No change in history. Patient here for skin graft over Integra of right great toe.    No changes in meds.    Gen: In no acute distress. Accompanied by family members.  Heart: RRR, pulse in normal range.  Lungs: CTAB. Normal respiratory rate.    PLAN: To OR for skin graft to right great toe.

## 2019-12-30 NOTE — ANESTHESIA PREPROCEDURE EVALUATION
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 No pertinent past medical history.   Past Surgical History:   Procedure Laterality Date     EXCISE MASS TOE Right 2019    Procedure: Excision of mass Right great toe;  Surgeon: Raoul Seals MD;  Location: [facility] OR     GRAFT SKIN SPLIT THICKNESS FROM EXTREMITY Right 2019    Procedure: Right toe biologics dressing;  Surgeon: Renato Tinoco MD;  Location: [facility] OR          Anesthesia Evaluation     . Pt has had prior anesthetic. Type: General           ROS/MED HX    ENT/Pulmonary:  - neg pulmonary ROS     Neurologic:  - neg neurologic ROS     Cardiovascular:  - neg cardiovascular ROS       METS/Exercise Tolerance:     Hematologic:  - neg hematologic  ROS       Musculoskeletal:  - neg musculoskeletal ROS       GI/Hepatic:  - neg GI/hepatic ROS       Renal/Genitourinary:  - ROS Renal section negative       Endo:  - neg endo ROS       Psychiatric:  - neg psychiatric ROS       Infectious Disease:  - neg infectious disease ROS       Malignancy:      - no malignancy   Other:                         PHYSICAL EXAM:   Mental Status/Neuro: A/A/O   Airway: Facies: Feasible  Mallampati: I  Mouth/Opening: Full  TM distance: > 6 cm  Neck ROM: Full   Respiratory: Auscultation: CTAB     Resp. Rate: Normal     Resp. Effort: Normal      CV: Rhythm: Regular  Rate: Age appropriate  Heart: Normal Sounds  Edema: None   Comments:      Dental: Normal Dentition                LABS:  CBC:   Lab Results   Component Value Date    HGB 13.4 2019     BMP:   Lab Results   Component Value Date    GLC 88 2019     COAGS: No results found for: PTT, INR, FIBR  POC:   Lab Results  "  Component Value Date    BGM 72 12/30/2019    HCG Negative 12/30/2019     OTHER: No results found for: PH, LACT, A1C, DAVIDA, PHOS, MAG, ALBUMIN, PROTTOTAL, ALT, AST, GGT, ALKPHOS, BILITOTAL, BILIDIRECT, LIPASE, AMYLASE, MERRICK, TSH, T4, T3, CRP, SED     Preop Vitals    BP Readings from Last 3 Encounters:   12/30/19 121/80   12/06/19 102/61    Pulse Readings from Last 3 Encounters:   12/30/19 81   12/06/19 69      Resp Readings from Last 3 Encounters:   12/30/19 16   12/06/19 15    SpO2 Readings from Last 3 Encounters:   12/30/19 100%   12/06/19 98%      Temp Readings from Last 1 Encounters:   12/30/19 36.5  C (97.7  F) (Oral)    Ht Readings from Last 1 Encounters:   12/30/19 1.702 m (5' 7\")      Wt Readings from Last 1 Encounters:   12/30/19 64 kg (141 lb 1.5 oz)    Estimated body mass index is 22.1 kg/m  as calculated from the following:    Height as of this encounter: 1.702 m (5' 7\").    Weight as of this encounter: 64 kg (141 lb 1.5 oz).     LDA:  Peripheral IV 12/30/19 Right Lower forearm (Active)   Site Assessment WDL 12/30/2019 10:00 AM   Line Status Saline locked 12/30/2019 10:00 AM   Phlebitis Scale 0-->no symptoms 12/30/2019 10:00 AM   Infiltration Scale 0 12/30/2019 10:00 AM   Infiltration Site Treatment Method  None 12/30/2019 10:00 AM   Extravasation? No 12/30/2019 10:00 AM   Number of days: 0       Airway - Adult/Peds laryngeal mask airway (Active)   Number of days: 0        Assessment:   ASA SCORE: 1    H&P: History and physical reviewed and following examination; no interval change.   Smoking Status:  Non-Smoker/Unknown   NPO Status: NPO Appropriate     Plan:   Anes. Type:  General   Pre-Medication: None   Induction:  IV (Standard)   Airway: LMA   Access/Monitoring: PIV   Maintenance: Balanced     Postop Plan:   Postop Pain: Opioids  Postop Sedation/Airway: Not planned  Disposition: Outpatient     PONV Management:   Adult Risk Factors: Female, Non-Smoker, Postop Opioids   Prevention: Ondansetron, " Dexamethasone, Propofol     CONSENT: Direct conversation   Plan and risks discussed with: Patient   Blood Products: Consent Deferred (Minimal Blood Loss)                   Benjie Plasencia MD

## 2019-12-30 NOTE — DISCHARGE INSTRUCTIONS
[facility]  Same-Day Surgery   Adult Discharge Orders & Instructions     For 24 hours after surgery    1. Get plenty of rest.  A responsible adult must stay with you for at least 24 hours after you leave the hospital.   2. Do not drive or use heavy equipment.  If you have weakness or tingling, don't drive or use heavy equipment until this feeling goes away.  3. Do not drink alcohol.  4. Avoid strenuous or risky activities.  Ask for help when climbing stairs.   5. You may feel lightheaded.  IF so, sit for a few minutes before standing.  Have someone help you get up.   6. If you have nausea (feel sick to your stomach): Drink only clear liquids such as apple juice, ginger ale, broth or 7-Up.  Rest may also help.  Be sure to drink enough fluids.  Move to a regular diet as you feel able.  7. You may have a slight fever. Call the doctor if your fever is over 100 F (37.7 C) (taken under the tongue) or lasts longer than 24 hours.  8. You may have a dry mouth, a sore throat, muscle aches or trouble sleeping.  These should go away after 24 hours.  9. Do not make important or legal decisions.   Call your doctor for any of the followin.  Signs of infection (fever, growing tenderness at the surgery site, a large amount of drainage or bleeding, severe pain, foul-smelling drainage, redness, swelling).    2. It has been over 8 to 10 hours since surgery and you are still not able to urinate (pass water).    3.  Headache for over 24 hours.    4.  Numbness, tingling or weakness the day after surgery (if you had spinal anesthesia).  To contact a doctor, call Dr. Tinoco at [phone] (clinic) or [phone] (office)       [phone] and ask for the resident on call for plastic surgery (answered 24 hours a day)      Emergency Department:    [facility]: [phone]       (TTY for hearing impaired: [phone])    [facility]: [phone]       (TTY for hearing impaired:  [phone])

## 2019-12-30 NOTE — ANESTHESIA CARE TRANSFER NOTE
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    Anesthesia Type:   No value filed.     Note:  Airway :Nasal Cannula  Patient transferred to:PACU  Comments: Patient transferred to PACU. VSS. Fentanyl for pain, denies nausea. Report given to receiving RN.Handoff Report: Identifed the Patient, Identified the Reponsible Provider, Reviewed the pertinent medical history, Discussed the surgical course, Reviewed Intra-OP anesthesia mangement and issues during anesthesia, Set expectations for post-procedure period and Allowed opportunity for questions and acknowledgement of understanding      Vitals: (Last set prior to Anesthesia Care Transfer)    CRNA VITALS  12/30/2019 1142 - 12/30/2019 1219      12/30/2019             Pulse:  91    SpO2:  98 %                Electronically Signed By: TENZIN Canales CRNA  December 30, 2019  12:19 PM

## 2019-12-30 NOTE — ANESTHESIA POSTPROCEDURE EVALUATION
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       Anesthesia Type:  Not documented  No value filed.    Reportable Event: NO     PAIN: Uncomplicated   Sign Out status: Comfortable, Well controlled pain     PONV: No PONV   Sign Out status:  No Nausea or Vomiting     Neuro/Psych: Uneventful perioperative course   Sign Out Status: Preoperative baseline; Age appropriate mentation     Airway/Resp.: Uneventful perioperative course   Sign Out Status: Non labored breathing, age appropriate RR; Resp. Status within EXPECTED Parameters     CV: Uneventful perioperative course   Sign Out status: Appropriate BP and perfusion indices; Appropriate HR/Rhythm     Disposition:   Sign Out in:  PACU  Disposition:  Floor  Recovery Course: Uneventful  Follow-Up: Not required           Last Anesthesia Record Vitals:  CRNA VITALS  2019 1142 - 2019 1242      2019             Pulse:  91    SpO2:  98 %          Last PACU Vitals:  Vitals Value Taken Time   /68 2019 12:40 PM   Temp 36.4  C (97.6  F) 2019 12:30 PM   Pulse 80 2019 12:40 PM   Resp 15 2019 12:30 PM   SpO2 99 % 2019 12:43 PM   Temp src     NIBP     Pulse     SpO2     Resp     Temp     Ht Rate     Temp 2     Vitals shown include unvalidated device data.      Electronically Signed By: Wendi Garcia MD, 2019, 12:44 PM

## 2019-12-31 NOTE — OP NOTE
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    PROCEDURES PERFORMED:   1.  Sharp debridement and washout of right great toe wound skin and Integra biologic dressing, size 2.5 x 2.5 cm.  2.  Coverage of right great toe wound with right groin full-thickness skin graft, size 2.5 x 2.5 cm.    SURGEON: Renato Tinoco MD    ASSISTANT: Agueda Grayson MD    ANESTHESIA: General.    ESTIMATED BLOOD LOSS: 10 mL    TOURNIQUET TIME: 0 min    FINDINGS: Healthy coverage of right great toe extensor tendon with Integra biologic dressing.    SPECIMENS: None.    COMPLICATIONS: None.    DRAINS: None.    IMPLANTS: None.    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  She has been undergoing daily silver dressing changes since then with a bolster intact.  Risks benefits and alternatives were discussed for definitive skin graft coverage.  Patient accepted the associated risks and wished to proceed.    DESCRIPTION OF PROCEDURE: Informed consent was obtained in the preoperative holding area.  The right foot was then marked.  Patient was then taken to the operating room and placed in supine position with all pressure points well-padded.  A left lower leg SCD was placed.  Preoperative antibiotics were given.  General anesthesia was obtained.  The right lower extremity was then prepped and draped circumferentially in a sterile fashion.  A timeout was performed.    The right great toe dressing was removed and the outer layer of the Integra biologic dressing was gently removed.   This revealed well taken Integra dressing over at the extensor tendon.  The skin edges as well as the most superficial layer of the Integra dressing was sharply debrided using a 15 blade scalpel down to punctate bleeding edges.  The wound was then thoroughly washed out using saline irrigation.  Wound measured 2.5 x 2.5 cm.    An appropriate skin graft was designed from the right groin.  This area was injected subdermally with 1% lidocaine with 1 100,000 epinephrine.  The skin graft was taken with a 15 blade scalpel and defatted.  This was then placed over the right great toe wound and sutured down using 5-0 fast absorbing gut.  3-0 chromic sutures were then used to place a tie-over bolster made out of Xeroform, cotton balls, and mineral oil.  The skin graft donor site was closed in layers.  Incision tape was placed over this.  All counts were correct at the end of the case.  A soft dressing was applied to the right foot.  Patient was then placed back into her boot.    DISPOSITION: Home.

## 2020-01-14 ENCOUNTER — OFFICE VISIT (OUTPATIENT)
Dept: ORTHOPEDICS | Facility: CLINIC | Age: 30
End: 2020-01-14
Payer: COMMERCIAL

## 2020-01-14 DIAGNOSIS — D21.21: Primary | ICD-10-CM

## 2020-01-14 NOTE — PROGRESS NOTES
Patient returns for a postoperative follow-up after undergoing skin grafting to the right great toe following re-excision of a benign tumor.    INTERVAL HISTORY: No acute events since her last visit together.  Patient has been performing dressing changes appropriately.    PHYSICAL EXAMINATION:  Right great toe dressings were removed.  This revealed a posterior.  This was removed as well.  It was on her percent skin graft take over the Integra dressing.  Patient is able to extend the IP joint of the right great toe.  Right groin skin graft donor site is healing appropriately.    ASSESSMENT: 2-week status post skin graft to right great toe.    PLAN:   Daily adaptic dressing changes.  May bear weight but limit ROM to great toe for another 2 weeks.  May shower but no scrubbing.  Send in photo in 2 weeks.      Total time spent with patient was 15 min of which greater than 50% was in counseling.    Answers for HPI/ROS submitted by the patient on 1/14/2020   General Symptoms: No  Skin Symptoms: No  HENT Symptoms: No  EYE SYMPTOMS: No  HEART SYMPTOMS: No  LUNG SYMPTOMS: No  INTESTINAL SYMPTOMS: No  URINARY SYMPTOMS: No  GYNECOLOGIC SYMPTOMS: No  BREAST SYMPTOMS: No  SKELETAL SYMPTOMS: No  BLOOD SYMPTOMS: No  NERVOUS SYSTEM SYMPTOMS: No  MENTAL HEALTH SYMPTOMS: No

## 2020-01-14 NOTE — NURSING NOTE
Reason For Visit:   Chief Complaint   Patient presents with     RECHECK     DOS 12/30/19 Right big toe mass       There were no vitals taken for this visit.    Pain Assessment  Patient Currently in Pain: Yes  0-10 Pain Scale: 5  Primary Pain Location: Foot(Right big toe)    Sweta Krueger ATC

## 2020-01-14 NOTE — LETTER
1/14/2020       RE: Reyna Martinez  [address] [address] [address]     Dear Colleague,    Thank you for referring your patient, Reyna Martinez, to the [facility]THOP[facility] at [facility]. Please see a copy of my visit note below.    Patient returns for a postoperative follow-up after undergoing skin grafting to the right great toe following re-excision of a benign tumor.    INTERVAL HISTORY: No acute events since her last visit together.  Patient has been performing dressing changes appropriately.    PHYSICAL EXAMINATION:  Right great toe dressings were removed.  This revealed a posterior.  This was removed as well.  It was on her percent skin graft take over the Integra dressing.  Patient is able to extend the IP joint of the right great toe.  Right groin skin graft donor site is healing appropriately.    ASSESSMENT: 2-week status post skin graft to right great toe.    PLAN:   Daily adaptic dressing changes.  May bear weight but limit ROM to great toe for another 2 weeks.  May shower but no scrubbing.  Send in photo in 2 weeks.    Total time spent with patient was 15 min of which greater than 50% was in counseling.    Again, thank you for allowing me to participate in the care of your patient.      Sincerely,    Renato Tinoco MD

## 2020-03-11 ENCOUNTER — HEALTH MAINTENANCE LETTER (OUTPATIENT)
Age: 30
End: 2020-03-11

## 2020-10-15 ENCOUNTER — TRANSFERRED RECORDS (OUTPATIENT)
Dept: HEALTH INFORMATION MANAGEMENT | Facility: CLINIC | Age: 30
End: 2020-10-15

## 2020-11-25 ENCOUNTER — TRANSCRIBE ORDERS (OUTPATIENT)
Dept: OTHER | Age: 30
End: 2020-11-25

## 2020-11-25 ENCOUNTER — MEDICAL CORRESPONDENCE (OUTPATIENT)
Dept: HEALTH INFORMATION MANAGEMENT | Facility: CLINIC | Age: 30
End: 2020-11-25

## 2020-11-25 DIAGNOSIS — R22.40 MASS OF TOE: Primary | ICD-10-CM

## 2020-11-30 ENCOUNTER — OFFICE VISIT (OUTPATIENT)
Dept: ORTHOPEDICS | Facility: CLINIC | Age: 30
End: 2020-11-30
Payer: COMMERCIAL

## 2020-11-30 VITALS — HEIGHT: 69 IN | BODY MASS INDEX: 20.73 KG/M2 | WEIGHT: 140 LBS

## 2020-11-30 DIAGNOSIS — D21.21: Primary | ICD-10-CM

## 2020-11-30 DIAGNOSIS — R22.40 MASS OF TOE: ICD-10-CM

## 2020-11-30 PROCEDURE — 99213 OFFICE O/P EST LOW 20 MIN: CPT | Performed by: ORTHOPAEDIC SURGERY

## 2020-11-30 ASSESSMENT — MIFFLIN-ST. JEOR: SCORE: 1401.54

## 2020-11-30 NOTE — PROGRESS NOTES
[facility] Phys[facility], Orthopaedic Oncology Surgery Consultation  by Raoul Seals M.D.    Reyna Martinez MRN# [id number]   Age: 29 year old YOB: 1990     Requesting physician: Morelia Patel     Background history:  DX:  1. 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      TREATMENTS:  1. 7/10/19, Right [place]za[place] [facility] [place]  2. 12/6/2019, wide excision of R great toe mass, Integra biologic dressing R great toe (rAnel/Renato Tinoco ) [facility]        Reyna returns for examination of her great toe as she feels the tumor on the dorsum of her great toe is recurring.  She is not had any additional surgery since the December 2019 procedure.  The biologic dressing is healed nicely and provided durable soft tissue coverage over the dorsum of her great toe.  She is able to write regular shoes and walk normally.    On examination, a fibrous soft tissue mass is appreciated on the medial border of her incision.  Measures approximately 1 cm x 1/2 cm.  Involves the margin along the incision wound incision edges medially.  It is nontender.  There is no inflammation.  The biologic Integra dressing has healed completely with excellent dermal coverage.  The area of soft tissue growth is palpable but not appreciated visually.                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    Plan:  As repeat excision would involve removal of the previous surgical area including the extensor hallucis longus tendon, she would lose her IP joint function and require soft tissue coverage.  It may involve the joint capsule of the IP joint as well.  Therefore reconstruction would involve fusion of the inner phalangeal joint and internal fixation with pinning.  Furthermore, a soft tissue coverage procedure, perhaps involving the Integra biologic dressing would be necessary again.  She would have a  functional deficit as well as a greater defect in the soft tissues along the dorsum of her foot.      Given the morbidity of the above described procedure, I advised her to monitor this area for continued growth before resorting to this more aggressive surgical procedure.  We will see her in 4 months time for repeat examination in person.  She will come sooner if there has been regrowth.    Raoul Seals MD  [facility] Family Professor  Oncology and Adult Reconstructive Surgery  Dept Orthopaedic Surgery, [facility]  [phone] office, [phone] pager  [email or web address]    Total Time = 15 min, 50% of which was spent in counseling and coordination of care as documented above.      Answers for HPI/ROS submitted by the patient on 11/30/2020   General Symptoms: No  Skin Symptoms: No  HENT Symptoms: No  EYE SYMPTOMS: No  HEART SYMPTOMS: No  LUNG SYMPTOMS: No  INTESTINAL SYMPTOMS: No  URINARY SYMPTOMS: No  GYNECOLOGIC SYMPTOMS: No  BREAST SYMPTOMS: No  SKELETAL SYMPTOMS: No  BLOOD SYMPTOMS: No  NERVOUS SYSTEM SYMPTOMS: No  MENTAL HEALTH SYMPTOMS: No

## 2020-11-30 NOTE — NURSING NOTE
"Chief Complaint   Patient presents with     RECHECK     followup right great toe mass excision DOS 12/6/19 // Dr. Tinoco referring back for possible tumor recurrence        30 year old  1990    Ht 1.74 m (5' 8.5\")   Wt 63.5 kg (140 lb)   BMI 20.97 kg/m                     Pain Assessment  Patient Currently in Pain: Yes  0-10 Pain Scale: 3  Primary Pain Location: Toe (Comment which one)(right great toe and anterior leg)  Pain Descriptors: Cramping(shooting pain to leg)                     [facility] - [place], [place] - [address]    No Known Allergies    Current Outpatient Medications   Medication     acetaminophen (TYLENOL) 500 MG tablet     etonogestrel-ethinyl estradiol (NUVARING) 0.12-0.015 MG/24HR vaginal ring     hydrOXYzine (VISTARIL) 25 MG capsule     hydrOXYzine (VISTARIL) 25 MG capsule     Misc. Devices (ROLLER WALKER) MISC     oxyCODONE (ROXICODONE) 5 MG tablet     senna-docusate (SENOKOT-S/PERICOLACE) 8.6-50 MG tablet     No current facility-administered medications for this visit.                          "

## 2020-11-30 NOTE — LETTER
11/30/2020         RE: Reyna Martinez  [address] [facility] [address]        Dear Colleague,    Thank you for referring your patient, Reyna Martinez, to the [facility] ORTHOPEDIC CLINIC [place]. Please see a copy of my visit note below.        [facility] Physicians, Orthopaedic Oncology Surgery Consultation  by Raoul Seals M.D.    Reyna Martinez MRN# [id number]   Age: 29 year old YOB: 1990     Requesting physician: Morelia Patel     Background history:  DX:  1. 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      TREATMENTS:  1. 7/10/19, Right toe mass excision, [place]. Eliecer [place]za[place] [facility] [place]  2. 12/6/2019, wide excision of R great toe mass, Integra biologic dressing R great toe (Arnel/Renato Tinoco ) [facility]        Reyna returns for examination of her great toe as she feels the tumor on the dorsum of her great toe is recurring.  She is not had any additional surgery since the December 2019 procedure.  The biologic dressing is healed nicely and provided durable soft tissue coverage over the dorsum of her great toe.  She is able to write regular shoes and walk normally.    On examination, a fibrous soft tissue mass is appreciated on the medial border of her incision.  Measures approximately 1 cm x 1/2 cm.  Involves the margin along the incision wound incision edges medially.  It is nontender.  There is no inflammation.  The biologic Integra dressing has healed completely with excellent dermal coverage.  The area of soft tissue growth is palpable but not appreciated visually.                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    Plan:  As repeat excision would involve removal of the previous surgical area including the extensor hallucis longus tendon, she would lose her IP joint function and require soft tissue coverage.  It may involve the joint capsule of the IP joint as well.   Therefore reconstruction would involve fusion of the inner phalangeal joint and internal fixation with pinning.  Furthermore, a soft tissue coverage procedure, perhaps involving the Integra biologic dressing would be necessary again.  She would have a functional deficit as well as a greater defect in the soft tissues along the dorsum of her foot.      Given the morbidity of the above described procedure, I advised her to monitor this area for continued growth before resorting to this more aggressive surgical procedure.  We will see her in 4 months time for repeat examination in person.  She will come sooner if there has been regrowth.    Raoul Seals MD  [facility] Family Professor  Oncology and Adult Reconstructive Surgery  Dept Orthopaedic Surgery, [facility]  [phone] office, [phone] pager  [email or web address]    Total Time = 15 min, 50% of which was spent in counseling and coordination of care as documented above.      Answers for HPI/ROS submitted by the patient on 11/30/2020   General Symptoms: No  Skin Symptoms: No  HENT Symptoms: No  EYE SYMPTOMS: No  HEART SYMPTOMS: No  LUNG SYMPTOMS: No  INTESTINAL SYMPTOMS: No  URINARY SYMPTOMS: No  GYNECOLOGIC SYMPTOMS: No  BREAST SYMPTOMS: No  SKELETAL SYMPTOMS: No  BLOOD SYMPTOMS: No  NERVOUS SYSTEM SYMPTOMS: No  MENTAL HEALTH SYMPTOMS: No

## 2021-01-03 ENCOUNTER — HEALTH MAINTENANCE LETTER (OUTPATIENT)
Age: 31
End: 2021-01-03

## 2021-04-25 ENCOUNTER — HEALTH MAINTENANCE LETTER (OUTPATIENT)
Age: 31
End: 2021-04-25

## 2021-06-17 ENCOUNTER — OFFICE VISIT (OUTPATIENT)
Dept: ORTHOPEDICS | Facility: CLINIC | Age: 31
End: 2021-06-17
Payer: COMMERCIAL

## 2021-06-17 VITALS — BODY MASS INDEX: 21.37 KG/M2 | WEIGHT: 141 LBS | HEIGHT: 68 IN

## 2021-06-17 DIAGNOSIS — D23.9 FIBROUS HISTIOCYTOMA: Primary | ICD-10-CM

## 2021-06-17 PROCEDURE — 99213 OFFICE O/P EST LOW 20 MIN: CPT | Performed by: ORTHOPAEDIC SURGERY

## 2021-06-17 ASSESSMENT — MIFFLIN-ST. JEOR: SCORE: 1388.58

## 2021-06-17 NOTE — PROGRESS NOTES
[facility], Orthopaedic Oncology Surgery Consultation  by Raoul Seals M.D.    Reyna Martinez MRN# [id number]   Age: 29 year old YOB: 1990     Requesting physician: Morelia Patel     Background history:  DX:  1. 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      TREATMENTS:  1. 7/10/19, Right toe mass excision, [place]. Eliecer Zaragoza[place] [facility] [place]  2. 12/6/2019, wide excision of R great toe mass, Integra biologic dressing R great toe (Arnel/Renato Tinoco) [facility]    Reyna returns for routine surveillance examination of her great toe.  The nodular area on the medial aspect of the great toe skin graft has resolved.  However she does a new nodule on the proximal portion of her incision at the 5 o'clock position as viewed from the dorsum.  She has some tingling in this area as well.  She is uncertain as to whether or not this nodule is enlarging.     She is active in all regular activities.  She has some stiffness with maximum flexion as compared to the contralateral extremity.  Some sensory dysesthesias along the dorsum of the skin graft site which affects what type of sandal she wears.    On examination:  The previous fibrous soft tissue mass is appreciated on the medial border of her incision is no longer present.    At the 5 o'clock position a subcutaneous nodular density paralleling the border of her incision is present and measuring approximately 8 to 10 mm in length by 2 to 3 mm thickness.  It is mobile.     Her gait is normal.  She can extend the MP and IP joint normally however has limited flexion as compared to the contralateral extremity.      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    Plan:  Expectant management.  I will see her in 6 months for recheck with repeat  physical examination of the dorsum of her great toe.  I have not recommended reexcision unless it clearly represents relapse as evidenced by increasing growth and volume in size.    Raoul Seals MD  [facility] Family Professor  Oncology and Adult Reconstructive Surgery  Dept Orthopaedic Surgery, [facility]  [phone] office, [phone] pager  [email or web address]    Total Time = 20 min, 50% of which was spent in counseling and coordination of care as documented above.

## 2021-10-10 ENCOUNTER — HEALTH MAINTENANCE LETTER (OUTPATIENT)
Age: 31
End: 2021-10-10

## 2021-10-22 ENCOUNTER — TELEPHONE (OUTPATIENT)
Dept: ORTHOPEDICS | Facility: CLINIC | Age: 31
End: 2021-10-22

## 2021-10-22 NOTE — TELEPHONE ENCOUNTER
Attempted to schedule patient for follow up with Dr. Seals. Left voicemail for patient with instructions on how to contact scheduling. Patient could likely be seen virtually.    Dom Alarcon, EMT on 10/22/2021 at 12:10 PM

## 2021-10-25 ENCOUNTER — MYC MEDICAL ADVICE (OUTPATIENT)
Dept: ORTHOPEDICS | Facility: CLINIC | Age: 31
End: 2021-10-25

## 2021-10-25 NOTE — TELEPHONE ENCOUNTER
LVM attempting to schedule follow up for pt. Pt is to be seen in December in person. Provided call center scheduling number and encouraged pt to call.     Nancy Avalos ATC

## 2021-10-28 NOTE — TELEPHONE ENCOUNTER
Scheduled 6 month follow up. Pt confirmed appt date/time/location. Mailed appt reminder to address on file.     Nancy Avalos ATC

## 2021-12-13 ENCOUNTER — TELEPHONE (OUTPATIENT)
Dept: ORTHOPEDICS | Facility: CLINIC | Age: 31
End: 2021-12-13

## 2021-12-13 NOTE — TELEPHONE ENCOUNTER
Called patient in attempt to reschedule missed appointment today. If patient calls back please schedule her for next available Return Tumor slot. Currently Monday, 12/20 has a few available. Patient has our number.    SAURABH Gonzales on 12/13/2021 at 1:38 PM

## 2022-01-17 ENCOUNTER — OFFICE VISIT (OUTPATIENT)
Dept: ORTHOPEDICS | Facility: CLINIC | Age: 32
End: 2022-01-17
Payer: COMMERCIAL

## 2022-01-17 VITALS — HEIGHT: 68 IN | BODY MASS INDEX: 22.88 KG/M2 | WEIGHT: 151 LBS

## 2022-01-17 DIAGNOSIS — R22.40 MASS OF TOE: Primary | ICD-10-CM

## 2022-01-17 PROCEDURE — 99214 OFFICE O/P EST MOD 30 MIN: CPT | Mod: GC | Performed by: ORTHOPAEDIC SURGERY

## 2022-01-17 ASSESSMENT — MIFFLIN-ST. JEOR: SCORE: 1438.43

## 2022-01-17 NOTE — LETTER
1/17/2022         RE: Reyna Martinez  [address] [facility] [address]        Dear Colleague,    Thank you for referring your patient, Reyna Martinez, to the [facility] ORTHOPEDIC CLINIC [place]. Please see a copy of my visit note below.      [facility] Physicians, Orthopaedic Oncology Surgery Consultation  by Raoul Seals M.D.    Reyna Martinez MRN# [id number]   Age: 29 year old YOB: 1990     Requesting physician: Morelia Patel     Background history:  DX:  1. 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      TREATMENTS:  1. 7/10/19, [place] [facility] [place]  2. 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     Plan:  She is a 20-year-old female who presents today for follow-up of her left great toe mass.  After examining the toe today I do believe there is a chance of recurrence of the dermatofibroma.  I recommended we obtain a MRI for further evaluation of this mass.  I explained potential treatment options including excision of the mass with expected extensor hallux tendon grafting versus fusion of the great toe versus amputation.  After the MRI is obtained she we will present her at tumor conference to review these potential options.  Following tumor conference we will follow-up virtually to discuss further treatment options.    She can weight-bear as tolerated and use the toe as tolerated until follow-up.    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  She believes the mass has increased in size over the last year.  She experiences pain over the mass when she is running  or the toe gets stepped on.  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  She is here today to discuss further treatment of this mass as she believes it is growing and would like to explore options.  She states she still has tingling over the backside of the foot which is not improved.  She denies any redness, discharge, or fevers.    Physical exam:   The graft is well-healed with no interval changes from previous appointment.  There is a palpable mass at the 5 o'clock position just proximal to the graft.  Today it measured 18 mm wide by 10 mm tall.  She experiences pain at this mass with palpation and with passive plantarflexion of the great toe.  Decreased sensation over the graft site and proximal to the mass.    She has a normal nonantalgic gait.  Full plantarflexion of the toe but has decreased dorsiflexion compared to the contralateral side.  Strength appears to be normal.  She has pain with dorsiflexion of the great toe. No Erythema, ecchymosis or swelling today.      1/17/2022          Adalid Darby PA-C  Physician Assistant   Oncology and Adult Reconstructive Surgery  Dept Orthopaedic Surgery, [facility]  [phone] pager  [email or web address]      Attending MD (Dr. Raoul Seals) Attestation :  This patient was seen and evaluated by me including a history, exam, and interpretation of all imaging and/or lab data.  Either a training physician (resident/fellow), who also saw the patient, or scribe has documented the visit in the attached note.    MD Baylee Wakefield  Oncology and Adult Reconstructive Surgery  Dept Orthopaedic Surgery, [facility]  [phone] office, [phone] pager  [email or web address]          Total time = 30 minutes, 50% of which was spent in counseling and coordination of care as documented above.

## 2022-01-17 NOTE — PROGRESS NOTES
[facility] Physicians, Orthopaedic Oncology Surgery Consultation  by Raoul Seals M.D.    Reyna Martinez MRN# [id number]   Age: 29 year old YOB: 1990     Requesting physician: Morelia Patel     Background history:  DX:  1. 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      TREATMENTS:  1. 7/10/19, Right toe mass excision, Dr. Eliecer Zaragoza, [facility].  2. 12/6/2019, wide excision of R great toe mass, Integra biologic dressing R great toe (Arnel/Renato Tinoco) [facility]    Impression:  Possible soft tissue recurrence of cellular fibrous histiocytoma./Dermatofibroma versus nodular scarring.     Plan:  She is a 20-year-old female who presents today for follow-up of her left great toe mass.  After examining the toe today I do believe there is a chance of recurrence of the dermatofibroma.  I recommended we obtain a MRI for further evaluation of this mass.  I explained potential treatment options including excision of the mass with expected extensor hallux tendon grafting versus fusion of the great toe versus amputation.  After the MRI is obtained she we will present her at tumor conference to review these potential options.  Following tumor conference we will follow-up virtually to discuss further treatment options.    She can weight-bear as tolerated and use the toe as tolerated until follow-up.    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  She believes the mass has increased in size over the last year.  She experiences pain over the mass when she is running or the toe gets stepped on.  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  She is here today to discuss further treatment of this mass as she believes it is growing and would like to explore options.  She states she still has tingling over the  backside of the foot which is not improved.  She denies any redness, discharge, or fevers.    Physical exam:   The graft is well-healed with no interval changes from previous appointment.  There is a palpable mass at the 5 o'clock position just proximal to the graft.  Today it measured 18 mm wide by 10 mm tall.  She experiences pain at this mass with palpation and with passive plantarflexion of the great toe.  Decreased sensation over the graft site and proximal to the mass.    She has a normal nonantalgic gait.  Full plantarflexion of the toe but has decreased dorsiflexion compared to the contralateral side.  Strength appears to be normal.  She has pain with dorsiflexion of the great toe. No Erythema, ecchymosis or swelling today.      1/17/2022          Adalid Darby PA-C  Physician Assistant   Oncology and Adult Reconstructive Surgery  Dept Orthopaedic Surgery, [facility]  [phone] pager  [email or web address]      Attending MD (Dr. Raoul Seals) Attestation :  This patient was seen and evaluated by me including a history, exam, and interpretation of all imaging and/or lab data.  Either a training physician (resident/fellow), who also saw the patient, or scribe has documented the visit in the attached note.    MD Baylee Wakefield  Oncology and Adult Reconstructive Surgery  Dept Orthopaedic Surgery, [facility]  [phone] office, [phone] pager  [email or web address]          Total time = 30 minutes, 50% of which was spent in counseling and coordination of care as documented above.

## 2022-01-25 ENCOUNTER — LAB REQUISITION (OUTPATIENT)
Dept: LAB | Facility: CLINIC | Age: 32
End: 2022-01-25
Payer: COMMERCIAL

## 2022-01-25 PROCEDURE — 88342 IMHCHEM/IMCYTCHM 1ST ANTB: CPT | Mod: TC,ORL | Performed by: PATHOLOGY

## 2022-01-25 PROCEDURE — 88341 IMHCHEM/IMCYTCHM EA ADD ANTB: CPT | Mod: 26 | Performed by: PATHOLOGY

## 2022-01-26 LAB
PATH REPORT.ADDENDUM SPEC: NORMAL
PATH REPORT.COMMENTS IMP SPEC: NORMAL
PATH REPORT.COMMENTS IMP SPEC: NORMAL

## 2022-01-31 ENCOUNTER — ANCILLARY PROCEDURE (OUTPATIENT)
Dept: MRI IMAGING | Facility: CLINIC | Age: 32
End: 2022-01-31
Attending: ORTHOPAEDIC SURGERY
Payer: COMMERCIAL

## 2022-01-31 DIAGNOSIS — R22.40 MASS OF TOE: ICD-10-CM

## 2022-01-31 PROCEDURE — A9585 GADOBUTROL INJECTION: HCPCS | Performed by: RADIOLOGY

## 2022-01-31 PROCEDURE — 73720 MRI LWR EXTREMITY W/O&W/DYE: CPT | Mod: RT | Performed by: RADIOLOGY

## 2022-01-31 RX ORDER — GADOBUTROL 604.72 MG/ML
7.5 INJECTION INTRAVENOUS ONCE
Status: COMPLETED | OUTPATIENT
Start: 2022-01-31 | End: 2022-01-31

## 2022-01-31 RX ADMIN — GADOBUTROL 7 ML: 604.72 INJECTION INTRAVENOUS at 18:41

## 2022-02-07 ENCOUNTER — VIRTUAL VISIT (OUTPATIENT)
Dept: ORTHOPEDICS | Facility: CLINIC | Age: 32
End: 2022-02-07
Payer: COMMERCIAL

## 2022-02-07 ENCOUNTER — TELEPHONE (OUTPATIENT)
Dept: ORTHOPEDICS | Facility: CLINIC | Age: 32
End: 2022-02-07

## 2022-02-07 DIAGNOSIS — D23.71 DERMATOFIBROMA OF RIGHT FOOT: Primary | ICD-10-CM

## 2022-02-07 PROCEDURE — 99214 OFFICE O/P EST MOD 30 MIN: CPT | Mod: 95 | Performed by: ORTHOPAEDIC SURGERY

## 2022-02-07 NOTE — NURSING NOTE
Please see wound care instructions which have been provided separately.      Reason For Visit:   Chief Complaint   Patient presents with     RECHECK     review right foot MRI        There were no vitals taken for this visit.    Pain Assessment  Patient Currently in Pain: Denies (no pain per pt)        Toyin Lott ATC

## 2022-02-07 NOTE — PROGRESS NOTES
[facility] Physicians, Orthopaedic Oncology Surgery Consultation  by Raoul Seals M.D.    Reyna Martinez MRN# [id number]   Age: 29 year old YOB: 1990     Requesting physician: Morelia Patel     Background history:  DX:  1. 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      TREATMENTS:  1. 7/10/19, Right toe mass excision, Dr. Eliecer Zaragoza, [facility].  2. 12/6/2019, wide excision of R great toe mass, Integra biologic dressing R great toe (Arnel/Renato Tinoco) [facility]    I met with Reyna and her  today for virtual visit regarding the enlarging growth of the soft tissue fibrous area on the dorsum of her left great toe.  I also spoke with Dr. Carlos about her diagnosis of the cellular fibrous histiocytoma (dermatofibroma) and additional immunohistochemical stains ERG were performed to look for evidence of the novel EWSR1-SMAD3 fibroblastic tumor and for beta catenin.  The tumor was negative for both these entities.  Therefore the original diagnosis of a cellular fibrous histiocytoma (dermatofibroma) remains unchanged.  These entities are known to have a high local recurrence rate and at the time of her last excision the deep margin was positive.  Unfortunately obtaining a negative margin will be impossible without removing a portion of the underlying bone as the tumor is directly applied and touching the bone.    We also discussed the possibility of performing an amputation of her great toe and this would probably involve disarticulation at the MP joint with use of a plantar based flap to close the amputation stump.  This certainly would provide her the lowest risk of local recurrence.    Therefore we discussed the risk benefits alternatives of either performing another limb salvage procedure to excise the tumor with plastic surgical soft tissue coverage afterwards however this would have a higher risk of local recurrence due to the likely  positive microscopic margin that would occur with this procedure as compared with a toe amputation.  Patient expressed that she was unwilling to consider 20 rotation at this juncture and will request that we make another attempt at preserving her toe while excising the tumor knowing that this will incur a higher risk of local relapse.    For planning purposes I will have her see Dr. Thom Adames of our plastic surgery department for preoperative evaluation and we will set surgical dates either as a 1 stage or two-stage procedure.      1/17/2022            Raoul Seals MD  [facility] Family Professor  Oncology and Adult Reconstructive Surgery  Dept Orthopaedic Surgery, [facility]  [phone] office, [phone] pager  [email or web address]    Virtual-Visit Details    Type of service:  Video/telephone Visit  Video/telephone total duration (including visit time, pre and post visit work time as documented above on the same day of service): 30    Visit start time: 1545  Visit end time:4:13 PM  Originating Location (pt. Location): Home  Distant Location (provider location):  [facility] ORTHOPEDIC [facility]   Platform used for Virtual Visit: [place]

## 2022-02-07 NOTE — TELEPHONE ENCOUNTER
Follow up call to Reyna after virtual visit with .    Discussed placing the referral to Dr. Adames with plastics.  Reyna would like to hold on discussing a surgical plan until after she has had this evaluation.  Provided Reyna with contact number for this RN with questions.    CORY CondonN, RN  RN Care Coordinator, Dr. Arnel HILL [facility] Orthopedic [facility]

## 2022-02-07 NOTE — LETTER
2/7/2022         RE: Reyna Martinez  [address] [facility] [address]        Dear Colleague,    Thank you for referring your patient, Reyna Martinez, to the [facility] ORTHOPEDIC CLINIC [place]. Please see a copy of my visit note below.      [facility] Physicians, Orthopaedic Oncology Surgery Consultation  by Raoul Seals M.D.    Reyna Martinez MRN# [id number]   Age: 29 year old YOB: 1990     Requesting physician: Morelia Patel     Background history:  DX:  1. 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      TREATMENTS:  1. 7/10/19, Right toe mass excision, Dr. Eliecer Zaragoza, [facility].  2. 12/6/2019, wide excision of R great toe mass, Integra biologic dressing R great toe (Arnel/Renato Tinoco) [facility]    I met with Reyna and her  today for virtual visit regarding the enlarging growth of the soft tissue fibrous area on the dorsum of her left great toe.  I also spoke with Dr. Carlos about her diagnosis of the cellular fibrous histiocytoma (dermatofibroma) and additional immunohistochemical stains ERG were performed to look for evidence of the novel EWSR1-SMAD3 fibroblastic tumor and for beta catenin.  The tumor was negative for both these entities.  Therefore the original diagnosis of a cellular fibrous histiocytoma (dermatofibroma) remains unchanged.  These entities are known to have a high local recurrence rate and at the time of her last excision the deep margin was positive.  Unfortunately obtaining a negative margin will be impossible without removing a portion of the underlying bone as the tumor is directly applied and touching the bone.    We also discussed the possibility of performing an amputation of her great toe and this would probably involve disarticulation at the MP joint with use of a plantar based flap to close the amputation stump.  This certainly would provide her the lowest risk of local  recurrence.    Therefore we discussed the risk benefits alternatives of either performing another limb salvage procedure to excise the tumor with plastic surgical soft tissue coverage afterwards however this would have a higher risk of local recurrence due to the likely positive microscopic margin that would occur with this procedure as compared with a toe amputation.  Patient expressed that she was unwilling to consider 20 rotation at this juncture and will request that we make another attempt at preserving her toe while excising the tumor knowing that this will incur a higher risk of local relapse.    For planning purposes I will have her see Dr. Thom Adames of our plastic surgery department for preoperative evaluation and we will set surgical dates either as a 1 stage or two-stage procedure.      1/17/2022            Raoul Seals MD  [facility] Family Professor  Oncology and Adult Reconstructive Surgery  Dept Orthopaedic Surgery, [facility]  [phone] office, [phone] pager  [email or web address]    Virtual-Visit Details    Type of service:  Video/telephone Visit  Video/telephone total duration (including visit time, pre and post visit work time as documented above on the same day of service): 30    Visit start time: 1545  Visit end time:4:13 PM  Originating Location (pt. Location): Home  Distant Location (provider location):  [facility] ORTHOPEDIC [facility]   Platform used for Virtual Visit: [email or web address]

## 2022-02-08 NOTE — TELEPHONE ENCOUNTER
FUTURE VISIT INFORMATION      FUTURE VISIT INFORMATION:    Date: 4/6/22    Time: 3:15pm    [place] [facility]  REFERRAL INFORMATION:    Referring provider:  Raoul Seals MD    Referring providers clinic:  [facility]    Reason for visit/diagnosis  Dermatofibroma of right foot    RECORDS REQUESTED FROM:       Clinic name Comments Records Status Imaging Status   [facility] Ov/referral 2/7/22 epic

## 2022-02-09 ENCOUNTER — TELEPHONE (OUTPATIENT)
Dept: SURGERY | Facility: CLINIC | Age: 32
End: 2022-02-09
Payer: COMMERCIAL

## 2022-02-09 NOTE — TELEPHONE ENCOUNTER
Spoke with pt who declined an appt this Friday as she states she is leaving for [place] until after 2/18. Pt scheduled on 2/25. Staff message sent to the [facility] team with this update...Amie Willson RN, RN  [facility],     I talked to Dr Adames about this patient, he wants to see her ASAP. Spoke with pt, she prefers a Friday in [place] if possible. He said OK to add her anywhere on his schedule where he has time for a 30 min consult.     Thanks!     Amie OWENS             Previous Messages       ----- Message -----   From: Thom Adames MD   Sent: 2/7/2022   4:46 PM CST   To: SAURABH Diop, Thom Adames MD, *   Subject: RE: New Referral                                 Happy to see her     Team, please schedule!   ----- Message -----   From: Rosa Buck RN   Sent: 2/7/2022   4:34 PM CST   To: SAURABH Diop, Thom Adames MD, *   Subject: New Referral                                     Hi Dr. Adames,     This is a patient Dr. Seals would like you to see, he did a virtual follow up visit today with Reyna and discussed the plan.     Per Dr Seals-    Enlarging growth of the soft tissue fibrous area on the dorsum of her left great toe, to see Dr. Thom Adames of our plastic surgery department for preoperative evaluation and we will set surgical dates either as a 1 stage or two-stage procedure.     Reyna does not want to discuss a surgical plan until after she has been seen by you so we are not currently holding any dates.     Thanks for seeing her!     Rosa Buck, BSN, RN  [phone]   RN Care Coordinator, Dr. Arnel HILL [facility] Orthopedic [facility]

## 2022-02-10 ENCOUNTER — TELEPHONE (OUTPATIENT)
Dept: ORTHOPEDICS | Facility: CLINIC | Age: 32
End: 2022-02-10
Payer: COMMERCIAL

## 2022-02-10 NOTE — TELEPHONE ENCOUNTER
Follow up call to Reyna to discuss that I see a plastics appt on 2/25.  Scheduled an in-person visit with Dr. Seals on 2/28 to discuss the surgical plan and get her questions answered.  Discussed with Reyna that her visit with Dr. Adames will determine if this will be a single surgical procedure or a staged procedure.    Reyna verbalized understanding, all questions answered.    SMOOTH Condon, RN  RN Care Coordinator, Dr. Seals  [facility] Orthopedic [facility]

## 2022-02-25 ENCOUNTER — OFFICE VISIT (OUTPATIENT)
Dept: SURGERY | Facility: CLINIC | Age: 32
End: 2022-02-25
Payer: COMMERCIAL

## 2022-02-25 VITALS — WEIGHT: 148.2 LBS | BODY MASS INDEX: 22.46 KG/M2 | HEIGHT: 68 IN

## 2022-02-25 DIAGNOSIS — R22.41 FOOT MASS, RIGHT: Primary | ICD-10-CM

## 2022-02-25 PROCEDURE — 99203 OFFICE O/P NEW LOW 30 MIN: CPT | Performed by: STUDENT IN AN ORGANIZED HEALTH CARE EDUCATION/TRAINING PROGRAM

## 2022-02-25 ASSESSMENT — PAIN SCALES - GENERAL: PAINLEVEL: MILD PAIN (3)

## 2022-02-25 NOTE — LETTER
"    2/25/2022         RE: Reyna Martinez  [address] [address] [place] [id number]        Dear Colleague,    Thank you for referring your patient, Reyna Martinez, to the [facility]. Please see a copy of my visit note below.    Ortho Hand    HPI: 31 year-old presenting with a right great toe dermatofibroma recurrence. She had an initial lesion develop in 7/2019. She underwent excision in 12/2019 with Integra placement and then staged full-thickness skin grafting from the right groin. The pathology margins remained positive deep and the great toe extensor was preserved. Since then, she has had recurrence and the mass is causing pain. Patient has a visit with Dr Seals again next week to discuss how she would like to proceed. She would like to salvage the toe if possible.     ROS: Negative, see HPI  PMH: Nondiabetic  PSH: As above. No surgeries to the thighs or forearms  Medications: APAP, Vistaril, Senna  Allergies: None  SH: Nonsmoker, denies tobacco or nicotine use  FH: No bleeding or clotting issues or problems with anesthesia    Examination:  Ht 1.727 m (5' 8\")   Wt 67.2 kg (148 lb 3.2 oz)   BMI 22.53 kg/m    Nonlabored breathing  Not distressed  Right dorsal foot with well-healed full-thickness skin graft over the MTP joint with recurrence of firm mass  Right EHL tendon intact  Palpable right dorsalis pedis pulse  Normal bilateral Dagoberto's tests with well-perfused hands with occlusion of radial arteries  No scars on the thighs  R groin scar at prior skin graft donor site    MR 1/31/2022: Infiltrative lesion within the subcutaneous fat dorsal to the first proximal phalanx measuring 3.0 x 2.3 x 1.0 cm 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    A/P: 31F p/w likely recurrence of R great toe fibrous histiocytoma    -Discussed the options for reconstructive management following toe-sparing extirpative surgery which will include repeat Integra with staged skin grafting or free flap. This " will depend on what is exposed (especially if there is bone exposed), whether tendon will need to be reconstructed, and how large the remaining wound is following resection. Question remains regarding whether complete negative margin resection will be attempted. If we were to do a free flap reconstruction, my recommendation would be to plan to try to achieve negative resection margins prior to free flap surgery. Given the thin pliability of the forearm and a normal Dagoberto's test, we discussed radial forearm flap coverage. Patient understands this would be a larger operation with a longer incision on the dorsal foot to exposed dorsalis pedis and possibly anterior tibial artery as well as incisions on the forearm with additional skin grafting to cover the donor site. In either case, patient will be discussing her goals with Dr Seals next week. Explained that I am happy to be available to help, by coordinating to be available at the time of the resection and/or for staged reconstruction. Explained that Dr Seals may perform the resection and temporize the wound with a wound VAC as we await pathologic margins. Patient understands and is agreeable.   -A total of 30 minutes was devoted to review of chart, direct face-to-face patient counseling and documentation during this encounter    Thom Adames MD, PhD        Again, thank you for allowing me to participate in the care of your patient.        Sincerely,        Thom Adames MD

## 2022-02-25 NOTE — NURSING NOTE
"Reyna Martinez's chief complaint for this visit includes:  Chief Complaint   Patient presents with     Consult     dermatofibroma right great toe     PCP: Morelia Atkinson    Referring Provider:  No referring provider defined for this encounter.    Ht 1.727 m (5' 8\")   Wt 67.2 kg (148 lb 3.2 oz)   BMI 22.53 kg/m    Mild Pain (3)      No Known Allergies      Do you need any medication refills at today's visit? No    Evangelina Tucker CMA        "

## 2022-02-26 NOTE — PROGRESS NOTES
"Ortho Hand    HPI: 31 year-old presenting with a right great toe dermatofibroma recurrence. She had an initial lesion develop in 7/2019. She underwent excision in 12/2019 with Integra placement and then staged full-thickness skin grafting from the right groin. The pathology margins remained positive deep and the great toe extensor was preserved. Since then, she has had recurrence and the mass is causing pain. Patient has a visit with Dr Seals again next week to discuss how she would like to proceed. She would like to salvage the toe if possible.     ROS: Negative, see HPI  PMH: Nondiabetic  PSH: As above. No surgeries to the thighs or forearms  Medications: APAP, Vistaril, Senna  Allergies: None  SH: Nonsmoker, denies tobacco or nicotine use  FH: No bleeding or clotting issues or problems with anesthesia    Examination:  Ht 1.727 m (5' 8\")   Wt 67.2 kg (148 lb 3.2 oz)   BMI 22.53 kg/m    Nonlabored breathing  Not distressed  Right dorsal foot with well-healed full-thickness skin graft over the MTP joint with recurrence of firm mass  Right EHL tendon intact  Palpable right dorsalis pedis pulse  Normal bilateral Dagoberto's tests with well-perfused hands with occlusion of radial arteries  No scars on the thighs  R groin scar at prior skin graft donor site    MR 1/31/2022: Infiltrative lesion within the subcutaneous fat dorsal to the first proximal phalanx measuring 3.0 x 2.3 x 1.0 cm 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    A/P: 31F p/w likely recurrence of R great toe fibrous histiocytoma    -Discussed the options for reconstructive management following toe-sparing extirpative surgery which will include repeat Integra with staged skin grafting or free flap. This will depend on what is exposed (especially if there is bone exposed), whether tendon will need to be reconstructed, and how large the remaining wound is following resection. Question remains regarding whether complete negative margin resection will " be attempted. If we were to do a free flap reconstruction, my recommendation would be to plan to try to achieve negative resection margins prior to free flap surgery. Given the thin pliability of the forearm and a normal Dagoberto's test, we discussed radial forearm flap coverage. Patient understands this would be a larger operation with a longer incision on the dorsal foot to exposed dorsalis pedis and possibly anterior tibial artery as well as incisions on the forearm with additional skin grafting to cover the donor site. In either case, patient will be discussing her goals with Dr Seals next week. Explained that I am happy to be available to help, by coordinating to be available at the time of the resection and/or for staged reconstruction. Explained that Dr Seals may perform the resection and temporize the wound with a wound VAC as we await pathologic margins. Patient understands and is agreeable.   -A total of 30 minutes was devoted to review of chart, direct face-to-face patient counseling and documentation during this encounter    Thom Adames MD, PhD

## 2022-02-28 ENCOUNTER — OFFICE VISIT (OUTPATIENT)
Dept: ORTHOPEDICS | Facility: CLINIC | Age: 32
End: 2022-02-28
Payer: COMMERCIAL

## 2022-02-28 VITALS — HEIGHT: 68 IN | BODY MASS INDEX: 22.45 KG/M2 | WEIGHT: 148.15 LBS

## 2022-02-28 DIAGNOSIS — D23.71 DERMATOFIBROMA OF RIGHT FOOT: Primary | ICD-10-CM

## 2022-02-28 PROCEDURE — 99213 OFFICE O/P EST LOW 20 MIN: CPT | Performed by: ORTHOPAEDIC SURGERY

## 2022-02-28 RX ORDER — RIZATRIPTAN BENZOATE 5 MG/1
5 TABLET ORAL
COMMUNITY
Start: 2021-11-29

## 2022-02-28 RX ORDER — NAPROXEN 500 MG/1
500 TABLET ORAL 2 TIMES DAILY WITH MEALS
COMMUNITY
Start: 2022-02-27 | End: 2022-05-23

## 2022-02-28 NOTE — PROGRESS NOTES
[facility], Orthopaedic Oncology Surgery Consultation  by Raoul Seals M.D.    Reyna Martinez MRN# [id number]   Age: 29 year old YOB: 1990     Requesting physician: Morelia Patel     Background history:  DX:  1. 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      TREATMENTS:  1. 7/10/19, Right toe mass excision, Dr. Eliecer Zaragoza, [facility] [place].  2. 12/6/2019, wide excision of R great toe mass, Integra biologic dressing R great toe (Arnel/Renato Tinoco) [facility]      I met with Reyna and her  and had extensive discussion regarding proceeding with excision of the recurrent benign fibrous histiocytoma tumor over the dorsum of her right great toe.  She is already met with Dr. Thom Savage to discuss soft tissue options consisting of either Integra skin graft reconstruction or free vascularized tissue transfer.  We will plan on proceeding in a staged manner by performing the resection first with application of a VAC dressing and Dr. Rosa merchant without proceed at a later date once surgical margins are confirmed.     At the last surgical procedure, we attempted to retain her extensor tendon cost of a positive margin which is now in part related to a recurrent tumor.  It is my opinion that her next tumor excision will be necessary to excise her extensor tendon.  This will result in a toe drop with loss of dorsiflexion.  We discussed potential options for dealing with this consisting of either #1 using an external orthotic to support the toe, #2 performing a primary arthrodesis of the IP and MTP joints, or #3 performing a staged tendon reconstructive procedure once soft tissue coverage is obtained or #4 primary amputation or disarticulation at the MP joint level.  It was my recommendation that she undergo an excision with no reconstruction and utilize an external orthotic to treat the toe drop.  Once a healthy soft tissue coverage that is  obtained, then secondary reconstruction using either tendon graft transfer or other procedure would be reasonable and the arthrodesis and amputation procedures would be reserved for salvage procedures at a later date if needed.            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    Raoul Seals MD  [facility] Family Professor  Oncology and Adult Reconstructive Surgery  Dept Orthopaedic Surgery, [facility]  [phone] office, [phone] pager  [email or web address]    Total combined visit time and work time before and after clinic visit = 30 min

## 2022-02-28 NOTE — NURSING NOTE
Confirmed surgical date of 4/15 with Reyna. Discussed that she may have a wound vac placed. Discussed that her surgical procedure with Plastics will follow and I will have Dr. Seals contact Dr. Adames.  Reyna will schedule her preop locally in [place]. Reyna aware to schedule her COVID test 4 days prior in [place]. Will alert this RN if they need an order.  Reyna has not had any recent positive COVID tests, is vaccinated for COVID.  Reviewed COVID visiting restrictions  Reyna denies cardiac or pulm history. Denies diabetes.  Denies clotting or bleeding disorders.  Reyna aware this will be a same day procedure.    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 No       Patient and  demonstrate understanding of the following:  Reason for the appointment, diagnosis and treatment plan: Yes  Knowledge of proper use of medications and conditions for which they are ordered (with special attention to potential side effects or drug interactions): Yes  Which situations necessitate calling provider and whom to contact: Yes       Teaching Concerns Addressed: yes    Stoplight Tool discussed: yes   Patient verbalized understanding:yes      Nutritional needs and diet plan: Yes  Pain management techniques: Yes  Wound Care: Yes  How and/when to access community resources: NA     Instructional Materials Used/Given: Preop Packet and Antiseptic Soap    Time spent with  patient: 30 minutes.    CORY CondonN, RN  RN Care Coordinator, Dr. Arnel HILL [facility]

## 2022-02-28 NOTE — LETTER
2/28/2022         RE: Reyna Martinez  [address] [facility] [address]        Dear Colleague,    Thank you for referring your patient, Reyna Martinez, to the [facility] [place]. Please see a copy of my visit note below.      [facility], Orthopaedic Oncology Surgery Consultation  by Raoul Seals M.D.    Reyna Martinez MRN# [id number]   Age: 29 year old YOB: 1990     Requesting physician: Morelia Patel     Background history:  DX:  1. 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      TREATMENTS:  1. 7/10/19, Right [place] mass [place]ie[place] [facility]  2. 12/6/2019, wide excision of R great toe mass, Integra biologic dressing R great toe (Arnel/Renato Tinoco) [facility]      I met with Reyna and her  and had extensive discussion regarding proceeding with excision of the recurrent benign fibrous histiocytoma tumor over the dorsum of her right great toe.  She is already met with Dr. Thom Savage to discuss soft tissue options consisting of either Integra skin graft reconstruction or free vascularized tissue transfer.  We will plan on proceeding in a staged manner by performing the resection first with application of a VAC dressing and Dr. Rosa merchant without proceed at a later date once surgical margins are confirmed.     At the last surgical procedure, we attempted to retain her extensor tendon cost of a positive margin which is now in part related to a recurrent tumor.  It is my opinion that her next tumor excision will be necessary to excise her extensor tendon.  This will result in a toe drop with loss of dorsiflexion.  We discussed potential options for dealing with this consisting of either #1 using an external orthotic to support the toe, #2 performing a primary arthrodesis of the IP and MTP joints, or #3 performing a staged tendon reconstructive procedure once soft tissue coverage is  obtained or #4 primary amputation or disarticulation at the MP joint level.  It was my recommendation that she undergo an excision with no reconstruction and utilize an external orthotic to treat the toe drop.  Once a healthy soft tissue coverage that is obtained, then secondary reconstruction using either tendon graft transfer or other procedure would be reasonable and the arthrodesis and amputation procedures would be reserved for salvage procedures at a later date if needed.            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    MD [facility] Family Professor  Oncology and Adult Reconstructive Surgery  Dept Orthopaedic Surgery, [facility]  [phone] office, [phone] pager  [email or web address]    Total combined visit time and work time before and after clinic visit = 30 min

## 2022-03-01 ENCOUNTER — PREP FOR PROCEDURE (OUTPATIENT)
Dept: ORTHOPEDICS | Facility: CLINIC | Age: 32
End: 2022-03-01
Payer: COMMERCIAL

## 2022-03-01 DIAGNOSIS — D23.71 DERMATOFIBROMA OF RIGHT FOOT: Primary | ICD-10-CM

## 2022-03-03 ENCOUNTER — TELEPHONE (OUTPATIENT)
Dept: ORTHOPEDICS | Facility: CLINIC | Age: 32
End: 2022-03-03
Payer: COMMERCIAL

## 2022-03-03 NOTE — TELEPHONE ENCOUNTER
Follow up call after speaking with Dr. Saels.   Surgery is scheduled for 4/15 with Dr. Seals. Dr Seals clarified that she is to be seen one week post-op by Dr. Adames's team and they will take over care of her wound.  Dr. Seals wants the wound vac that is placed at the end of the OR to remain in place and not be changed until Dr. Adames takes over her care.   This was Reyna's understanding also.  The follow-up is not schedule with Plastics.    Will send a message to the Plastics team and ask them to follow up with her.    Reyna verbalized understanding.    CORY CondonN, RN  RN Care Coordinator, Dr. Arnel HILL [facility] Orthopedic [facility]

## 2022-03-06 DIAGNOSIS — Z11.59 ENCOUNTER FOR SCREENING FOR OTHER VIRAL DISEASES: Primary | ICD-10-CM

## 2022-03-07 ENCOUNTER — TELEPHONE (OUTPATIENT)
Dept: SURGERY | Facility: CLINIC | Age: 32
End: 2022-03-07
Payer: COMMERCIAL

## 2022-03-07 NOTE — TELEPHONE ENCOUNTER
Appt on 4/22/22 scheduled. [facility] [place] via staff message Amie Willson RN, RN P [facility] Plastic Surgery Adult [place]; Thom Adames MD  Hi,     I spoke with pt this morning, she prefers April 22nd in [place] at 10am. She is ok with this appointment being booked and [facility] She plans to reach out to the clinic closer to the appointment to see if final pathology results are available to discuss that day.     Amie GUNDERSON RN

## 2022-03-28 NOTE — PROGRESS NOTES
SURGERY PLAN (PRE-OP PLAN)    Brief:  Ancef// Supine// wide excision of right great toe recurrent fibrous histiocytoma/cellular dermatofibroma// application of wound vac    Background history:  DX:  1. 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      TREATMENTS:  1. 7/10/19, Right toe mass excision, Dr. Eliecer Zaragoza, [facility] [place].  2. 12/6/2019, wide excision of R great toe mass, Integra biologic dressing R great toe (Arnel/Renato Tinoco) [facility]     I met with Reyna and her  and had extensive discussion regarding proceeding with excision of the recurrent benign fibrous histiocytoma tumor over the dorsum of her right great toe.  She is already met with Dr. Thom Savage to discuss soft tissue options consisting of either Integra skin graft reconstruction or free vascularized tissue transfer.  We will plan on proceeding in a staged manner by performing the resection first with application of a VAC dressing and Dr. Rosa merchant without proceed at a later date once surgical margins are confirmed.      At the last surgical procedure, we attempted to retain her extensor tendon cost of a positive margin which is now in part related to a recurrent tumor.  It is my opinion that her next tumor excision will be necessary to excise her extensor tendon.  This will result in a toe drop with loss of dorsiflexion.  We discussed potential options for dealing with this consisting of either #1 using an external orthotic to support the toe, #2 performing a primary arthrodesis of the IP and MTP joints, or #3 performing a staged tendon reconstructive procedure once soft tissue coverage is obtained or #4 primary amputation or disarticulation at the MP joint level.  It was my recommendation that she undergo an excision with no reconstruction and utilize an external orthotic to treat the toe drop.  Once a healthy soft tissue coverage that is obtained, then secondary reconstruction using either tendon  graft transfer or other procedure would be reasonable and the arthrodesis and amputation procedures would be reserved for salvage procedures at a later date if needed.               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    Patient Position (indicated by x):    Supine   x  Supine with torso rolled up on a bump     Floppy lateral on torso length bean bag     Lateral decubitus, bean bag, full length     Lateral decubitus, Wixson hip positioner     Safety paddle side supports x 2 clamped to side rail     Lithotomy, both legs in yellow padded leg rachel     Lithotomy, single leg in yellow padded leg rachel     Prone on blanket rolls/round gel pad     Prone on Brody (arched) frame on Francois table     Single thigh in orange arthroscopy clamp     Beach chair semi recumbent     Spider limb positioner     Arm out on radiolucent arm table     Split drape with top bar     Revision EZIO drape with plastic side bags for leg     Extremity drape     Shoulder pack drape     Laparotomy drape     Shah catheter          General Equipment Requests (indicated by x):      C-Arm with C-Armor drape     C-Arm (video capable, BlackBamboozStudio 9900 model)     O-Arm with Stealth imaging   x  Regular Table     Francois XR Table     SurgiGraphic 6000 (diving board) fluoro table     Cell saver     Arnel Biopsy trephine set w/ K-wire & pituitary rongeurs     Small pituitary rongeur     Arnel's angled curettes, narrow shaft     Bone graft, kapner gouges     Midas Saul Medtronic fred, electric motor     Phenol 5%     [place] BMAC stem cell     Vancomycin 1 gram powder     Zometa 4 mg vials     Depo Medrol steroid     Blunt Pelvic Retractor (.55, Blunt Hohmann with  slight bend)     (1) Portable hand held radiation detector machine for sentinel node biopsy and (2) Lymphazurin     [place] Osteotomes      Specimens and cultures (indicated by x):     Tissue cultures, aerobic and anaerobic without gram stain     Frozen section     pathology specimens - fresh   x  pathology specimens - formalin     Mayito Driscoll DO  Adult Joint Reconstruction Fellow  Dept Orthopaedic Surgery, [facility]

## 2022-04-06 ENCOUNTER — PRE VISIT (OUTPATIENT)
Dept: ORTHOPEDICS | Facility: CLINIC | Age: 32
End: 2022-04-06

## 2022-04-14 ENCOUNTER — ANESTHESIA EVENT (OUTPATIENT)
Dept: SURGERY | Facility: CLINIC | Age: 32
End: 2022-04-14
Payer: COMMERCIAL

## 2022-04-15 ENCOUNTER — HOSPITAL ENCOUNTER (OUTPATIENT)
Facility: CLINIC | Age: 32
Discharge: HOME | End: 2022-04-15
Attending: ORTHOPAEDIC SURGERY | Admitting: ORTHOPAEDIC SURGERY
Payer: COMMERCIAL

## 2022-04-15 ENCOUNTER — ANESTHESIA (OUTPATIENT)
Dept: SURGERY | Facility: CLINIC | Age: 32
End: 2022-04-15
Payer: COMMERCIAL

## 2022-04-15 VITALS
WEIGHT: 145.5 LBS | DIASTOLIC BLOOD PRESSURE: 59 MMHG | HEART RATE: 76 BPM | SYSTOLIC BLOOD PRESSURE: 112 MMHG | BODY MASS INDEX: 22.05 KG/M2 | RESPIRATION RATE: 12 BRPM | TEMPERATURE: 98.4 F | OXYGEN SATURATION: 100 % | HEIGHT: 68 IN

## 2022-04-15 DIAGNOSIS — Z11.59 ENCOUNTER FOR SCREENING FOR OTHER VIRAL DISEASES: Primary | ICD-10-CM

## 2022-04-15 DIAGNOSIS — D21.21: Primary | ICD-10-CM

## 2022-04-15 LAB — GLUCOSE BLDC GLUCOMTR-MCNC: 73 MG/DL (ref 70–99)

## 2022-04-15 PROCEDURE — 88307 TISSUE EXAM BY PATHOLOGIST: CPT | Mod: TC | Performed by: ORTHOPAEDIC SURGERY

## 2022-04-15 PROCEDURE — 250N000011 HC RX IP 250 OP 636: Performed by: ORTHOPAEDIC SURGERY

## 2022-04-15 PROCEDURE — 250N000009 HC RX 250: Performed by: ORTHOPAEDIC SURGERY

## 2022-04-15 PROCEDURE — 272N000001 HC OR GENERAL SUPPLY STERILE: Performed by: ORTHOPAEDIC SURGERY

## 2022-04-15 PROCEDURE — 88307 TISSUE EXAM BY PATHOLOGIST: CPT | Mod: 26 | Performed by: DERMATOLOGY

## 2022-04-15 PROCEDURE — 88342 IMHCHEM/IMCYTCHM 1ST ANTB: CPT | Mod: 26 | Performed by: DERMATOLOGY

## 2022-04-15 PROCEDURE — 710N000012 HC RECOVERY PHASE 2, PER MINUTE: Performed by: ORTHOPAEDIC SURGERY

## 2022-04-15 PROCEDURE — 370N000017 HC ANESTHESIA TECHNICAL FEE, PER MIN: Performed by: ORTHOPAEDIC SURGERY

## 2022-04-15 PROCEDURE — 258N000003 HC RX IP 258 OP 636

## 2022-04-15 PROCEDURE — 250N000011 HC RX IP 250 OP 636: Performed by: PHYSICIAN ASSISTANT

## 2022-04-15 PROCEDURE — 360N000075 HC SURGERY LEVEL 2, PER MIN: Performed by: ORTHOPAEDIC SURGERY

## 2022-04-15 PROCEDURE — 250N000011 HC RX IP 250 OP 636: Performed by: ANESTHESIOLOGY

## 2022-04-15 PROCEDURE — 250N000011 HC RX IP 250 OP 636

## 2022-04-15 PROCEDURE — 250N000025 HC SEVOFLURANE, PER MIN: Performed by: ORTHOPAEDIC SURGERY

## 2022-04-15 PROCEDURE — 710N000010 HC RECOVERY PHASE 1, LEVEL 2, PER MIN: Performed by: ORTHOPAEDIC SURGERY

## 2022-04-15 PROCEDURE — 250N000013 HC RX MED GY IP 250 OP 250 PS 637: Performed by: ANESTHESIOLOGY

## 2022-04-15 PROCEDURE — 82962 GLUCOSE BLOOD TEST: CPT

## 2022-04-15 PROCEDURE — 88341 IMHCHEM/IMCYTCHM EA ADD ANTB: CPT | Mod: 26 | Performed by: DERMATOLOGY

## 2022-04-15 PROCEDURE — 999N000141 HC STATISTIC PRE-PROCEDURE NURSING ASSESSMENT: Performed by: ORTHOPAEDIC SURGERY

## 2022-04-15 PROCEDURE — 250N000009 HC RX 250

## 2022-04-15 PROCEDURE — 250N000013 HC RX MED GY IP 250 OP 250 PS 637: Performed by: ORTHOPAEDIC SURGERY

## 2022-04-15 RX ORDER — NALOXONE HYDROCHLORIDE 0.4 MG/ML
0.4 INJECTION, SOLUTION INTRAMUSCULAR; INTRAVENOUS; SUBCUTANEOUS
Status: DISCONTINUED | OUTPATIENT
Start: 2022-04-15 | End: 2022-04-20 | Stop reason: HOSPADM

## 2022-04-15 RX ORDER — EPHEDRINE SULFATE 50 MG/ML
INJECTION, SOLUTION INTRAMUSCULAR; INTRAVENOUS; SUBCUTANEOUS PRN
Status: DISCONTINUED | OUTPATIENT
Start: 2022-04-15 | End: 2022-04-15

## 2022-04-15 RX ORDER — NALOXONE HYDROCHLORIDE 0.4 MG/ML
0.2 INJECTION, SOLUTION INTRAMUSCULAR; INTRAVENOUS; SUBCUTANEOUS
Status: DISCONTINUED | OUTPATIENT
Start: 2022-04-15 | End: 2022-04-20 | Stop reason: HOSPADM

## 2022-04-15 RX ORDER — OXYCODONE HYDROCHLORIDE 5 MG/1
5-10 TABLET ORAL EVERY 4 HOURS PRN
Qty: 12 TABLET | Refills: 0 | Status: ON HOLD | OUTPATIENT
Start: 2022-04-15 | End: 2022-05-06

## 2022-04-15 RX ORDER — FENTANYL CITRATE 50 UG/ML
25 INJECTION, SOLUTION INTRAMUSCULAR; INTRAVENOUS EVERY 5 MIN PRN
Status: DISCONTINUED | OUTPATIENT
Start: 2022-04-15 | End: 2022-04-20 | Stop reason: HOSPADM

## 2022-04-15 RX ORDER — SODIUM CHLORIDE, SODIUM LACTATE, POTASSIUM CHLORIDE, CALCIUM CHLORIDE 600; 310; 30; 20 MG/100ML; MG/100ML; MG/100ML; MG/100ML
INJECTION, SOLUTION INTRAVENOUS CONTINUOUS PRN
Status: DISCONTINUED | OUTPATIENT
Start: 2022-04-15 | End: 2022-04-15

## 2022-04-15 RX ORDER — SODIUM CHLORIDE, SODIUM LACTATE, POTASSIUM CHLORIDE, CALCIUM CHLORIDE 600; 310; 30; 20 MG/100ML; MG/100ML; MG/100ML; MG/100ML
INJECTION, SOLUTION INTRAVENOUS CONTINUOUS
Status: DISCONTINUED | OUTPATIENT
Start: 2022-04-15 | End: 2022-04-20 | Stop reason: HOSPADM

## 2022-04-15 RX ORDER — HYDROMORPHONE HYDROCHLORIDE 1 MG/ML
0.2 INJECTION, SOLUTION INTRAMUSCULAR; INTRAVENOUS; SUBCUTANEOUS EVERY 5 MIN PRN
Status: DISCONTINUED | OUTPATIENT
Start: 2022-04-15 | End: 2022-04-20 | Stop reason: HOSPADM

## 2022-04-15 RX ORDER — ACETAMINOPHEN 325 MG/1
975 TABLET ORAL ONCE
Status: COMPLETED | OUTPATIENT
Start: 2022-04-15 | End: 2022-04-15

## 2022-04-15 RX ORDER — ONDANSETRON 2 MG/ML
4 INJECTION INTRAMUSCULAR; INTRAVENOUS EVERY 30 MIN PRN
Status: DISCONTINUED | OUTPATIENT
Start: 2022-04-15 | End: 2022-04-20 | Stop reason: HOSPADM

## 2022-04-15 RX ORDER — METOPROLOL TARTRATE 1 MG/ML
1-2 INJECTION, SOLUTION INTRAVENOUS EVERY 5 MIN PRN
Status: DISCONTINUED | OUTPATIENT
Start: 2022-04-15 | End: 2022-04-20 | Stop reason: HOSPADM

## 2022-04-15 RX ORDER — MAGNESIUM HYDROXIDE 1200 MG/15ML
LIQUID ORAL PRN
Status: DISCONTINUED | OUTPATIENT
Start: 2022-04-15 | End: 2022-04-15 | Stop reason: HOSPADM

## 2022-04-15 RX ORDER — OXYCODONE HYDROCHLORIDE 5 MG/1
5 TABLET ORAL EVERY 4 HOURS PRN
Status: DISCONTINUED | OUTPATIENT
Start: 2022-04-15 | End: 2022-04-20 | Stop reason: HOSPADM

## 2022-04-15 RX ORDER — CEFAZOLIN SODIUM/WATER 2 G/20 ML
2 SYRINGE (ML) INTRAVENOUS SEE ADMIN INSTRUCTIONS
Status: DISCONTINUED | OUTPATIENT
Start: 2022-04-15 | End: 2022-04-15 | Stop reason: HOSPADM

## 2022-04-15 RX ORDER — OXYCODONE HYDROCHLORIDE 5 MG/1
5 TABLET ORAL
Status: COMPLETED | OUTPATIENT
Start: 2022-04-15 | End: 2022-04-15

## 2022-04-15 RX ORDER — ONDANSETRON 4 MG/1
4 TABLET, ORALLY DISINTEGRATING ORAL EVERY 30 MIN PRN
Status: DISCONTINUED | OUTPATIENT
Start: 2022-04-15 | End: 2022-04-20 | Stop reason: HOSPADM

## 2022-04-15 RX ORDER — FENTANYL CITRATE 50 UG/ML
25 INJECTION, SOLUTION INTRAMUSCULAR; INTRAVENOUS
Status: DISCONTINUED | OUTPATIENT
Start: 2022-04-15 | End: 2022-04-20 | Stop reason: HOSPADM

## 2022-04-15 RX ORDER — LIDOCAINE HYDROCHLORIDE 20 MG/ML
INJECTION, SOLUTION INFILTRATION; PERINEURAL PRN
Status: DISCONTINUED | OUTPATIENT
Start: 2022-04-15 | End: 2022-04-15

## 2022-04-15 RX ORDER — DEXAMETHASONE SODIUM PHOSPHATE 4 MG/ML
INJECTION, SOLUTION INTRA-ARTICULAR; INTRALESIONAL; INTRAMUSCULAR; INTRAVENOUS; SOFT TISSUE PRN
Status: DISCONTINUED | OUTPATIENT
Start: 2022-04-15 | End: 2022-04-15

## 2022-04-15 RX ORDER — KETOROLAC TROMETHAMINE 30 MG/ML
INJECTION, SOLUTION INTRAMUSCULAR; INTRAVENOUS PRN
Status: DISCONTINUED | OUTPATIENT
Start: 2022-04-15 | End: 2022-04-15

## 2022-04-15 RX ORDER — ONDANSETRON 2 MG/ML
INJECTION INTRAMUSCULAR; INTRAVENOUS PRN
Status: DISCONTINUED | OUTPATIENT
Start: 2022-04-15 | End: 2022-04-15

## 2022-04-15 RX ORDER — SODIUM CHLORIDE, SODIUM LACTATE, POTASSIUM CHLORIDE, CALCIUM CHLORIDE 600; 310; 30; 20 MG/100ML; MG/100ML; MG/100ML; MG/100ML
INJECTION, SOLUTION INTRAVENOUS CONTINUOUS
Status: DISCONTINUED | OUTPATIENT
Start: 2022-04-15 | End: 2022-04-15 | Stop reason: HOSPADM

## 2022-04-15 RX ORDER — FENTANYL CITRATE 50 UG/ML
INJECTION, SOLUTION INTRAMUSCULAR; INTRAVENOUS PRN
Status: DISCONTINUED | OUTPATIENT
Start: 2022-04-15 | End: 2022-04-15

## 2022-04-15 RX ORDER — BUPIVACAINE HYDROCHLORIDE 2.5 MG/ML
INJECTION, SOLUTION EPIDURAL; INFILTRATION; INTRACAUDAL PRN
Status: DISCONTINUED | OUTPATIENT
Start: 2022-04-15 | End: 2022-04-15 | Stop reason: HOSPADM

## 2022-04-15 RX ORDER — PROPOFOL 10 MG/ML
INJECTION, EMULSION INTRAVENOUS PRN
Status: DISCONTINUED | OUTPATIENT
Start: 2022-04-15 | End: 2022-04-15

## 2022-04-15 RX ORDER — ACETAMINOPHEN 325 MG/1
650 TABLET ORAL
Status: DISCONTINUED | OUTPATIENT
Start: 2022-04-15 | End: 2022-04-20 | Stop reason: HOSPADM

## 2022-04-15 RX ORDER — LIDOCAINE 40 MG/G
CREAM TOPICAL
Status: DISCONTINUED | OUTPATIENT
Start: 2022-04-15 | End: 2022-04-15 | Stop reason: HOSPADM

## 2022-04-15 RX ORDER — CEFAZOLIN SODIUM/WATER 2 G/20 ML
2 SYRINGE (ML) INTRAVENOUS
Status: COMPLETED | OUTPATIENT
Start: 2022-04-15 | End: 2022-04-15

## 2022-04-15 RX ORDER — HYDRALAZINE HYDROCHLORIDE 20 MG/ML
2.5-5 INJECTION INTRAMUSCULAR; INTRAVENOUS EVERY 10 MIN PRN
Status: DISCONTINUED | OUTPATIENT
Start: 2022-04-15 | End: 2022-04-20 | Stop reason: HOSPADM

## 2022-04-15 RX ADMIN — FENTANYL CITRATE 50 MCG: 50 INJECTION, SOLUTION INTRAMUSCULAR; INTRAVENOUS at 07:51

## 2022-04-15 RX ADMIN — HYDROMORPHONE HYDROCHLORIDE 0.2 MG: 1 INJECTION, SOLUTION INTRAMUSCULAR; INTRAVENOUS; SUBCUTANEOUS at 09:41

## 2022-04-15 RX ADMIN — Medication 7.5 MG: at 08:49

## 2022-04-15 RX ADMIN — OXYCODONE 5 MG: 5 TABLET ORAL at 09:46

## 2022-04-15 RX ADMIN — Medication 5 MG: at 08:37

## 2022-04-15 RX ADMIN — SODIUM CHLORIDE, POTASSIUM CHLORIDE, SODIUM LACTATE AND CALCIUM CHLORIDE: 600; 310; 30; 20 INJECTION, SOLUTION INTRAVENOUS at 07:49

## 2022-04-15 RX ADMIN — DEXAMETHASONE SODIUM PHOSPHATE 8 MG: 4 INJECTION, SOLUTION INTRAMUSCULAR; INTRAVENOUS at 08:30

## 2022-04-15 RX ADMIN — KETOROLAC TROMETHAMINE 15 MG: 30 INJECTION, SOLUTION INTRAMUSCULAR at 08:23

## 2022-04-15 RX ADMIN — LIDOCAINE HYDROCHLORIDE 60 MG: 20 INJECTION, SOLUTION INFILTRATION; PERINEURAL at 07:52

## 2022-04-15 RX ADMIN — MIDAZOLAM 2 MG: 1 INJECTION INTRAMUSCULAR; INTRAVENOUS at 07:39

## 2022-04-15 RX ADMIN — Medication 2 G: at 07:41

## 2022-04-15 RX ADMIN — ACETAMINOPHEN 975 MG: 325 TABLET ORAL at 06:48

## 2022-04-15 RX ADMIN — FENTANYL CITRATE 50 MCG: 50 INJECTION, SOLUTION INTRAMUSCULAR; INTRAVENOUS at 08:23

## 2022-04-15 RX ADMIN — ONDANSETRON 4 MG: 2 INJECTION INTRAMUSCULAR; INTRAVENOUS at 08:30

## 2022-04-15 RX ADMIN — Medication 7.5 MG: at 08:56

## 2022-04-15 RX ADMIN — PROPOFOL 150 MG: 10 INJECTION, EMULSION INTRAVENOUS at 07:52

## 2022-04-15 NOTE — ANESTHESIA CARE TRANSFER NOTE
Patient: Reyna Martinez    Procedure: Procedure(s):  Wide Excision of Recurrent Benign Fibrocystic Cytoma, Cellular Dermatofibroma of Right Great Toe Dorsum, Application of VAC Dressing       Diagnosis: Dermatofibroma of right foot [D23.71]  Diagnosis Additional Information: No value filed.    Anesthesia Type:   General     Note:    Oropharynx: spontaneously breathing and oropharynx clear of all foreign objects  Level of Consciousness: drowsy and awake  Oxygen Supplementation: face mask  Level of Supplemental Oxygen (L/min / FiO2): 6  Independent Airway: airway patency satisfactory and stable  Dentition: dentition unchanged  Vital Signs Stable: post-procedure vital signs reviewed and stable  Report to RN Given: handoff report given  Patient transferred to: PACU    Handoff Report: Identifed the Patient, Identified the Reponsible Provider, Reviewed the pertinent medical history, Discussed the surgical course, Reviewed Intra-OP anesthesia mangement and issues during anesthesia, Set expectations for post-procedure period and Allowed opportunity for questions and acknowledgement of understanding      Vitals:  Vitals Value Taken Time   /61 04/15/22 0914   Temp 36.8    Pulse 94 04/15/22 0919   Resp 20 04/15/22 0919   SpO2 100 % 04/15/22 0919   Vitals shown include unvalidated device data.    Electronically Signed By: TENZIN Casas CRNA  April 15, 2022  9:20 AM

## 2022-04-15 NOTE — ANESTHESIA POSTPROCEDURE EVALUATION
Patient: Reyna Martinez    Procedure: Procedure(s):  Wide Excision of Recurrent Benign Fibrocystic Cytoma, Cellular Dermatofibroma of Right Great Toe Dorsum, Application of VAC Dressing       Anesthesia Type:  General    Note:  Disposition: Outpatient   Postop Pain Control: Uneventful            Sign Out: Well controlled pain   PONV: No   Neuro/Psych: Uneventful            Sign Out: Acceptable/Baseline neuro status   Airway/Respiratory: Uneventful            Sign Out: Acceptable/Baseline resp. status   CV/Hemodynamics: Uneventful            Sign Out: Acceptable CV status; No obvious hypovolemia; No obvious fluid overload   Other NRE: NONE   DID A NON-ROUTINE EVENT OCCUR? No           Last vitals:  Vitals Value Taken Time   /67 04/15/22 1000   Temp 36.9  C (98.4  F) 04/15/22 0945   Pulse 76 04/15/22 1000   Resp 12 04/15/22 1000   SpO2 100 % 04/15/22 1013   Vitals shown include unvalidated device data.    Electronically Signed By: Torin Ring MD  April 15, 2022  12:13 PM

## 2022-04-15 NOTE — ANESTHESIA PREPROCEDURE EVALUATION
Anesthesia Pre-Procedure Evaluation    Patient: Reyna Martinez   MRN: [id number] : 1990        Procedure : Procedure(s):  Wide Excision of Recurrent Benign Fibrocystic Cytoma, Cellular Dermatofibroma of Right Great Toe Dorsum, Application of VAC Dressing          History reviewed. No pertinent past medical history.   Past Surgical History:   Procedure Laterality Date     EXCISE MASS TOE Right 2019    Procedure: Excision of mass Right great toe;  Surgeon: Raoul Seals MD;  Location: [facility] OR     GRAFT SKIN SPLIT THICKNESS FROM EXTREMITY Right 2019    Procedure: with right groin skin graft;  Surgeon: Renato Tinoco MD;  Location: [facility] OR     GRAFT SKIN SPLIT THICKNESS FROM EXTREMITY Right 2019    Procedure: Right toe biologics dressing;  Surgeon: Renato Tinoco MD;  Location: [facility] OR     IRRIGATION AND DEBRIDEMENT LOWER EXTREMITY, COMBINED Right 2019    Procedure: Right toe wound coverage;  Surgeon: Renato Tinoco MD;  Location: [facility] OR      No Known Allergies   Social History     Tobacco Use     Smoking status: Never Smoker     Smokeless tobacco: Never Used   Substance Use Topics     Alcohol use: Never      Wt Readings from Last 1 Encounters:   04/15/22 66 kg (145 lb 8.1 oz)        Anesthesia Evaluation   Pt has had prior anesthetic. Type: General.    No history of anesthetic complications       ROS/MED HX  ENT/Pulmonary:  - neg pulmonary ROS     Neurologic:  - neg neurologic ROS     Cardiovascular:  - neg cardiovascular ROS     METS/Exercise Tolerance: >4 METS    Hematologic:  - neg hematologic  ROS     Musculoskeletal:  - neg musculoskeletal ROS     GI/Hepatic:  - neg GI/hepatic ROS     Renal/Genitourinary:  - neg Renal ROS     Endo:  - neg endo ROS     Psychiatric/Substance Use:  - neg psychiatric ROS     Infectious Disease:  - neg infectious disease ROS     Malignancy:  - neg malignancy ROS     Other:  - neg other ROS          Physical Exam    Airway        Mallampati: I   TM distance:  > 3 FB   Neck ROM: full   Mouth opening: > 3 cm    Respiratory Devices and Support         Dental  no notable dental history         Cardiovascular   cardiovascular exam normal          Pulmonary   pulmonary exam normal                OUTSIDE LABS:  CBC:   Lab Results   Component Value Date    HGB 13.4 12/06/2019     BMP:   Lab Results   Component Value Date    GLC 73 04/15/2022    GLC 88 12/06/2019     COAGS: No results found for: PTT, INR, FIBR  POC:   Lab Results   Component Value Date    BGM 72 12/30/2019    HCG Negative 12/30/2019     HEPATIC: No results found for: ALBUMIN, PROTTOTAL, ALT, AST, GGT, ALKPHOS, BILITOTAL, BILIDIRECT, MERRICK  OTHER: No results found for: PH, LACT, A1C, DAVIDA, PHOS, MAG, LIPASE, AMYLASE, TSH, T4, T3, CRP, SED    Anesthesia Plan    ASA Status:  2   NPO Status:  NPO Appropriate    Anesthesia Type: General.     - Airway: LMA   Induction: Intravenous, Propofol.   Maintenance: Balanced.        Consents    Anesthesia Plan(s) and associated risks, benefits, and realistic alternatives discussed. Questions answered and patient/representative(s) expressed understanding.    - Discussed:     - Discussed with:  Patient      - Specific Concerns: sore throat, post op pain/nausea, dental damage, rare major complications.     - Extended Intubation/Ventilatory Support Discussed: No.      - Patient is DNR/DNI Status: No    Use of blood products discussed: No .     Postoperative Care    Pain management: IV analgesics, Oral pain medications, Multi-modal analgesia.   PONV prophylaxis: Ondansetron (or other 5HT-3), Dexamethasone or Solumedrol, Background Propofol Infusion     Comments:           H&P reviewed: Unable to attach H&P to encounter due to EHR limitations. H&P Update: appropriate H&P reviewed, patient examined. No interval changes since H&P (within 30 days).         Torin Ring MD

## 2022-04-15 NOTE — OR NURSING
This writer called orthotics department to check on arrival of hard soled shoe that had been ordered for home. Per orthotics department, this would not arrive in phase 2 until after 1400. Patient is ready to discharge and reports that she has a shoe at home and does not want to wait, orthotics notified.

## 2022-04-15 NOTE — BRIEF OP NOTE
[facility] Orthopaedic Surgery  Operative Note            Procedure date 4/15/2022   Pre-operative diagnosis: Dermatofibroma of right foot [D23.71]   Post-operative diagnosis same as pre-operative   Procedure: Procedure(s):  Wide Excision of Recurrent Benign Fibrocystic Cytoma, Cellular Dermatofibroma of Right Great Toe Dorsum, Application of VAC Dressing   2. Excision of left extensor hallucis longus tendon.   Surgeon: Raoul Seals MD   Assistants(s): tom Darby PA-c, This surgical procedure required surgical assistance.  A physician's assistant provided surgical assistance as there was no qualified trainee, resident or fellow, available to provide surgical assistance services.     Anesthesia: General    Estimated blood loss: Minimal   Total IV fluids: (See anesthesia record)   Blood transfusion: (See anesthesia record)   Total urine output: (See anesthesia record)   Drains: None     Specimens:  ID Type Source Tests Collected by Time Destination   1 : Dorsum right great toe PLEASE NOTE COLOR INDICATORS APPLIED TO SPECIMEN. Tissue Foot, Right SURGICAL PATHOLOGY EXAM Raoul Seals MD 4/15/2022  8:44 AM        Implants:  * No implants in log *      Procedure details:  This patient has recurrent fibrous tumor on the dorsum of her right great toe.  A larger wide excision with negative margins was planned.  Therefore the area outlined on the dorsum of the toe measures approximately 3 x 4 cm in size extending from the nailbed to the proximal phalanx and to the webspace between the great toe and the second toe.    #15 blade was used to excise through the skin and around the fibrous recurrent tumor.  I excised around the tumor extending to the webspace of the great toe and the second toe.  This was then opened up.  Care was taken preserve the digital nerve.  The dissection plane was then taken proximally to the proximal phalanx.       At this point the  tenotomy was performed of the extensor houses longus tendon at the level of the proximal phalanx.  I then continued to reflect the tendon off of the distal end of the proximal phalanx and then reflected the soft tissues.  Subcu dissection along the periosteum of the toe was then undertaken.  The entire mass was now completely removed.  Hemostasis was secured.  I tacked the proximal end of the extensor houses longus tendon with Prolene suture.  The distal end of the tendon was divided at its insertion into the distal phalanx.  To maintain dorsiflexion of distal phalanx a point suture was placed through the periosteum extending and bridging the dorsal aspect of the proximal and distal phalanx.     A vacuum-assisted closure dressing was then applied.       Raoul Seals MD  [facility] Family Professor  Oncology and Adult Reconstructive Surgery  Dept Orthopaedic Surgery, [facility]  [phone] office, [phone] pager  [email or web address]

## 2022-04-15 NOTE — ANESTHESIA PROCEDURE NOTES
Airway       Patient location during procedure: OR  Staff -        CRNA: Renato Young APRN CRNA       Performed By: CRNA  Consent for Airway        Urgency: elective  Indications and Patient Condition       Indications for airway management: haim-procedural       Induction type:intravenous       Mask difficulty assessment: 1 - vent by mask    Final Airway Details       Final airway type: supraglottic airway    Supraglottic Airway Details        Type: LMA       Brand: Air-Q       LMA size: 3.5    Post intubation assessment        Placement verified by: capnometry, equal breath sounds and chest rise        Number of attempts at approach: 1       Number of other approaches attempted: 0       Secured with: plastic tape       Ease of procedure: easy       Dentition: Intact and Unchanged

## 2022-04-15 NOTE — DISCHARGE INSTRUCTIONS
Same-Day Surgery   Adult Discharge Orders & Instructions     For 24 hours after surgery:  Get plenty of rest.  A responsible adult must stay with you for at least 24 hours after you leave the hospital.   Pain medication can slow your reflexes. Do not drive or use heavy equipment.  If you have weakness or tingling, don't drive or use heavy equipment until this feeling goes away.  Mixing alcohol and pain medication can cause dizziness and slow your breathing. It can even be fatal. Do not drink alcohol while taking pain medication.  Avoid strenuous or risky activities.  Ask for help when climbing stairs.   You may feel lightheaded.  If so, sit for a few minutes before standing.  Have someone help you get up.   If you have nausea (feel sick to your stomach), drink only clear liquids such as apple juice, ginger ale, broth or 7-Up.  Rest may also help.  Be sure to drink enough fluids.  Move to a regular diet as you feel able. Take pain medications with a small amount of solid food, such as toast or crackers, to avoid nausea.   A slight fever is normal. Call the doctor if your fever is over 100 F (37.7 C) (taken under the tongue) or lasts longer than 24 hours.  You may have a dry mouth, muscle aches, trouble sleeping or a sore throat.  These symptoms should go away after 24 hours.  Do not make important or legal decisions.   Pain Management:      1. Take pain medication (if prescribed) for pain as directed by your physician.        2. WARNING: If the pain medication you have been prescribed contains Tylenol  (acetaminophen), DO NOT take additional doses of Tylenol (acetaminophen).     Call your doctor for any of the followin.  Signs of infection (fever, growing tenderness at the surgery site, severe pain, a large amount of drainage or bleeding, foul-smelling drainage, redness, swelling).    2.  It has been over 8 to 10 hours since surgery and you are still not able to urinate (pee).    3.  Headache for over 24  hours.    4.  Numbness, tingling or weakness the day after surgery (if you had spinal anesthesia).  To contact a doctor, call _____________________________________ or:  '   [phone] and ask for the Resident On Call for:          __________________orthopedics ________________________ (answered 24 hours a day)  '   Emergency Department:  [facility]: [phone]  [facility]: [phone]    Weight bearing as tolerated  Leave wound vac in place  Okay to shower with wound vac in place  Follow up appointment as noted   Wound vac at 125 mmHg                Rev. 10/2014

## 2022-04-22 ENCOUNTER — OFFICE VISIT (OUTPATIENT)
Dept: SURGERY | Facility: CLINIC | Age: 32
End: 2022-04-22
Payer: COMMERCIAL

## 2022-04-22 DIAGNOSIS — S81.802D WOUND OF LEFT LOWER EXTREMITY, SUBSEQUENT ENCOUNTER: Primary | ICD-10-CM

## 2022-04-22 PROCEDURE — 99212 OFFICE O/P EST SF 10 MIN: CPT | Performed by: STUDENT IN AN ORGANIZED HEALTH CARE EDUCATION/TRAINING PROGRAM

## 2022-04-22 RX ORDER — OXYCODONE HYDROCHLORIDE 5 MG/1
5 TABLET ORAL EVERY 6 HOURS PRN
Qty: 12 TABLET | Refills: 0 | Status: SHIPPED | OUTPATIENT
Start: 2022-04-22 | End: 2022-04-25

## 2022-04-22 NOTE — LETTER
4/22/2022         RE: Reyna Martinez  [address] [address] [place] [id number]        Dear Colleague,    Thank you for referring your patient, Reyna Martinez, to the [facility]. Please see a copy of my visit note below.    PRS    Returns after excision with Dr. Seals.  VAC in place.    On exam, VAC removed and there is a 5 x 3 cm right dorsal foot wound centered over the first metatarsophalangeal joint with 5 cm segment of missing great toe extensor.    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  Options for free flap include SCIP or radial forearm taken from either side.  Explained that free flap reconstruction involves hospital admission, strict right foot elevation and dangling protocol over period of weeks.  Since the free flap is small, the dangling protocol can likely be accelerated.  This will depend on the immediate postoperative examination as the dangling protocol is initiated.  If motion in the great toe is to be preserved, my recommendation is for great toe extensor reconstruction using tendon graft taken from adjacent toe extensors along with free fasciocutaneous free flap coverage with donor site management done by either complex closure or skin grafting.  Since the pathology results have not been finalized, the patient and I agreed to revisit this conversation over the phone to make a final decision.  In any case, a case request will be placed and we will be prepared for any of the options discussed.  -Photography done  -Wound was cleansed and a VAC was placed again  -Case request will be placed    Thom  MD Zacarias, PhD      Again, thank you for allowing me to participate in the care of your patient.        Sincerely,        Thom Adames MD

## 2022-04-22 NOTE — NURSING NOTE
Reyna Martinez's goals for this visit include:   Chief Complaint   Patient presents with     Surgical Followup       She requests these members of her care team be copied on today's visit information: no    PCP: Morelia Atkinson    Referring Provider:  No referring provider defined for this encounter.    LMP 03/28/2022 (Approximate)     Do you need any medication refills at today's visit? No    Salina Bolaños LPN

## 2022-04-22 NOTE — PROGRESS NOTES
PRS    Returns after excision with Dr. Seals.  VAC in place.    On exam, VAC removed and there is a 5 x 3 cm right dorsal foot wound centered over the first metatarsophalangeal joint with 5 cm segment of missing great toe extensor.    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  Options for free flap include SCIP or radial forearm taken from either side.  Explained that free flap reconstruction involves hospital admission, strict right foot elevation and dangling protocol over period of weeks.  Since the free flap is small, the dangling protocol can likely be accelerated.  This will depend on the immediate postoperative examination as the dangling protocol is initiated.  If motion in the great toe is to be preserved, my recommendation is for great toe extensor reconstruction using tendon graft taken from adjacent toe extensors along with free fasciocutaneous free flap coverage with donor site management done by either complex closure or skin grafting.  Since the pathology results have not been finalized, the patient and I agreed to revisit this conversation over the phone to make a final decision.  In any case, a case request will be placed and we will be prepared for any of the options discussed.  -Photography done  -Wound was cleansed and a VAC was placed again  -Case request will be placed    Thom Adames MD, PhD

## 2022-04-25 ENCOUNTER — PREP FOR PROCEDURE (OUTPATIENT)
Dept: SURGERY | Facility: CLINIC | Age: 32
End: 2022-04-25
Payer: COMMERCIAL

## 2022-04-25 ENCOUNTER — ANESTHESIA EVENT (OUTPATIENT)
Dept: SURGERY | Facility: CLINIC | Age: 32
DRG: 465 | End: 2022-04-25
Payer: COMMERCIAL

## 2022-04-25 DIAGNOSIS — S81.801D OPEN WOUND OF RIGHT LOWER EXTREMITY, SUBSEQUENT ENCOUNTER: Primary | ICD-10-CM

## 2022-04-26 ENCOUNTER — TELEPHONE (OUTPATIENT)
Dept: SURGERY | Facility: CLINIC | Age: 32
End: 2022-04-26
Payer: COMMERCIAL

## 2022-04-26 NOTE — TELEPHONE ENCOUNTER
----- Message from Thom Adames MD sent at 4/26/2022  4:18 PM CDT -----  I called patient today  We should be good  ----- Message -----  From: Sarika Ramírez  Sent: 4/26/2022  10:21 AM CDT  To: SAURABH Diop Thomas M Suszynski, MD, #    Dr. Adames,    I have the new orders scheduled - thank you for signing.     Do I need to call this patient or are we good to go?    Sarika   ----- Message -----  From: Thom Adames MD  Sent: 4/26/2022   8:29 AM CDT  To: SAURABH Diop Thomas M Suszynski, MD, #    Thanks for following up  I will page him this afternoon  ----- Message -----  From: Amie Zaragoza RN  Sent: 4/25/2022   2:14 PM CDT  To: SAURABH Diop Thomas M Suszynski, MD, #    Pt has COVID test scheduled for today at 4pm locally near her home. I spoke with Dr Biggs in pathology, he is getting 2nd and 3rd opinions on the slides he has, he is running additional stain testing. 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 If you want to discuss with him Dr Johnson, he recommended you just page him at [phone]    Amie OWENS  ----- Message -----  From: Amie Zaragoza RN  Sent: 4/25/2022   9:20 AM CDT  To: SAURABH Diop Thomas M Suszynski, MD, #    I called pathology, they will have the pathologist reach out if any updates, otherwise they will ask to have the report released. Pt informed of the status via phone    Amie GUNDERSON RN  ----- Message -----  From: Sarika Ramírez  Sent: 4/25/2022   7:39 AM CDT  To: SAURABH Diop Thomas M Suszynski, MD, #    Do we need to tell her to get a COVID test today incase she is having surgery on Thursday?    It looks like she lives in [place] and had her COVID test with her PCP for her surgery with [facility]. We are cutting it close on time for results.    Sarika   ----- Message -----  From: Thom Adames MD  Sent: 4/25/2022   7:15 AM CDT  To: Chino Mccann, EMT, Thom Adames MD,  #    Team  Good morning!  Can someone from the team (maybe Amie) contact pathology regarding this patient's 4/15 surgical specimen?   I promised we would discuss/revisit surgical plan for 4/28 once pathology is finalized.   I would also ask that someone from the team just call the patient today and let her know that we are waiting for pathology to finalize and that I still plan to call her after this has been done    Thank you!

## 2022-04-26 NOTE — TELEPHONE ENCOUNTER
RN Care Coordinator: Amie Zaragoza; [phone] and/or Caden Luis; [phone]     Surgery is scheduled with Dr. Adames on 4/28 at the [facility].  Scheduled per treatment plan.    H&P already completed    COVID-19 test done locally     Post-op:   5/13 with Dr. Adames    Patient will receive a phone call from pre-admission nurses 1-2 days prior to surgery with arrival and start time.    Patient will contact clinic near home to schedule COVID-19 test 2-4 days prior to surgery.    RNCC to contact patient, and so I do not need to contact the patient at this time. RNCC will contact me if I need to reach out to the patient. No further action needed at this time.

## 2022-04-28 ENCOUNTER — HOSPITAL ENCOUNTER (INPATIENT)
Facility: CLINIC | Age: 32
LOS: 8 days | Discharge: HOME | DRG: 465 | End: 2022-05-06
Attending: STUDENT IN AN ORGANIZED HEALTH CARE EDUCATION/TRAINING PROGRAM | Admitting: STUDENT IN AN ORGANIZED HEALTH CARE EDUCATION/TRAINING PROGRAM
Payer: COMMERCIAL

## 2022-04-28 ENCOUNTER — ANESTHESIA (OUTPATIENT)
Dept: SURGERY | Facility: CLINIC | Age: 32
DRG: 465 | End: 2022-04-28
Payer: COMMERCIAL

## 2022-04-28 DIAGNOSIS — D21.21: ICD-10-CM

## 2022-04-28 LAB
ABO/RH(D): NORMAL
ANION GAP SERPL CALCULATED.3IONS-SCNC: 9 MMOL/L (ref 3–14)
ANTIBODY SCREEN: NEGATIVE
BASE EXCESS BLDA CALC-SCNC: -1.5 MMOL/L (ref -9.6–2)
BUN SERPL-MCNC: 11 MG/DL (ref 7–30)
CA-I BLD-MCNC: 4.7 MG/DL (ref 4.4–5.2)
CALCIUM SERPL-MCNC: 8.5 MG/DL (ref 8.5–10.1)
CHLORIDE BLD-SCNC: 108 MMOL/L (ref 94–109)
CO2 SERPL-SCNC: 22 MMOL/L (ref 20–32)
CREAT SERPL-MCNC: 0.57 MG/DL (ref 0.52–1.04)
CREAT SERPL-MCNC: 0.58 MG/DL (ref 0.52–1.04)
ERYTHROCYTE [DISTWIDTH] IN BLOOD BY AUTOMATED COUNT: 12.7 % (ref 10–15)
GFR SERPL CREATININE-BSD FRML MDRD: >90 ML/MIN/1.73M2
GFR SERPL CREATININE-BSD FRML MDRD: >90 ML/MIN/1.73M2
GLUCOSE BLD-MCNC: 151 MG/DL (ref 70–99)
GLUCOSE BLD-MCNC: 160 MG/DL (ref 70–99)
GLUCOSE BLDC GLUCOMTR-MCNC: 81 MG/DL (ref 70–99)
HCO3 BLDA-SCNC: 22 MMOL/L (ref 21–28)
HCT VFR BLD AUTO: 32.8 % (ref 35–47)
HGB BLD-MCNC: 11.3 G/DL (ref 11.7–15.7)
HGB BLD-MCNC: 12.2 G/DL (ref 11.7–15.7)
LACTATE BLD-SCNC: 2 MMOL/L
MAGNESIUM SERPL-MCNC: 1.8 MG/DL (ref 1.6–2.3)
MCH RBC QN AUTO: 31.4 PG (ref 26.5–33)
MCHC RBC AUTO-ENTMCNC: 34.5 G/DL (ref 31.5–36.5)
MCV RBC AUTO: 91 FL (ref 78–100)
O2/TOTAL GAS SETTING VFR VENT: 50 %
OXYHGB MFR BLDA: 98 % (ref 92–100)
PATH REPORT.ADDENDUM SPEC: NORMAL
PATH REPORT.COMMENTS IMP SPEC: NORMAL
PATH REPORT.FINAL DX SPEC: NORMAL
PATH REPORT.GROSS SPEC: NORMAL
PATH REPORT.MICROSCOPIC SPEC OTHER STN: NORMAL
PATH REPORT.RELEVANT HX SPEC: NORMAL
PCO2 BLDA: 33 MM HG (ref 35–45)
PH BLDA: 7.44 [PH] (ref 7.35–7.45)
PHOTO IMAGE: NORMAL
PLATELET # BLD AUTO: 258 10E3/UL (ref 150–450)
PO2 BLDA: 221 MM HG (ref 80–105)
POTASSIUM BLD-SCNC: 4 MMOL/L (ref 3.4–5.3)
POTASSIUM BLD-SCNC: 4 MMOL/L (ref 3.5–5)
RBC # BLD AUTO: 3.6 10E6/UL (ref 3.8–5.2)
SODIUM BLD-SCNC: 138 MMOL/L (ref 133–144)
SODIUM SERPL-SCNC: 139 MMOL/L (ref 133–144)
SPECIMEN EXPIRATION DATE: NORMAL
WBC # BLD AUTO: 12.8 10E3/UL (ref 4–11)

## 2022-04-28 PROCEDURE — 84132 ASSAY OF SERUM POTASSIUM: CPT

## 2022-04-28 PROCEDURE — 250N000009 HC RX 250: Performed by: STUDENT IN AN ORGANIZED HEALTH CARE EDUCATION/TRAINING PROGRAM

## 2022-04-28 PROCEDURE — 999N000141 HC STATISTIC PRE-PROCEDURE NURSING ASSESSMENT: Performed by: STUDENT IN AN ORGANIZED HEALTH CARE EDUCATION/TRAINING PROGRAM

## 2022-04-28 PROCEDURE — 86850 RBC ANTIBODY SCREEN: CPT | Performed by: ANESTHESIOLOGY

## 2022-04-28 PROCEDURE — 278N000051 HC OR IMPLANT GENERAL: Performed by: STUDENT IN AN ORGANIZED HEALTH CARE EDUCATION/TRAINING PROGRAM

## 2022-04-28 PROCEDURE — 258N000003 HC RX IP 258 OP 636: Performed by: ANESTHESIOLOGY

## 2022-04-28 PROCEDURE — 36415 COLL VENOUS BLD VENIPUNCTURE: CPT | Performed by: STUDENT IN AN ORGANIZED HEALTH CARE EDUCATION/TRAINING PROGRAM

## 2022-04-28 PROCEDURE — 272N000001 HC OR GENERAL SUPPLY STERILE: Performed by: STUDENT IN AN ORGANIZED HEALTH CARE EDUCATION/TRAINING PROGRAM

## 2022-04-28 PROCEDURE — 82565 ASSAY OF CREATININE: CPT | Performed by: STUDENT IN AN ORGANIZED HEALTH CARE EDUCATION/TRAINING PROGRAM

## 2022-04-28 PROCEDURE — 82810 BLOOD GASES O2 SAT ONLY: CPT

## 2022-04-28 PROCEDURE — 250N000011 HC RX IP 250 OP 636: Performed by: STUDENT IN AN ORGANIZED HEALTH CARE EDUCATION/TRAINING PROGRAM

## 2022-04-28 PROCEDURE — 120N000002 HC R&B MED SURG/OB UMMC

## 2022-04-28 PROCEDURE — 15757 FREE SKIN FLAP MICROVASC: CPT | Mod: RT | Performed by: STUDENT IN AN ORGANIZED HEALTH CARE EDUCATION/TRAINING PROGRAM

## 2022-04-28 PROCEDURE — 250N000025 HC SEVOFLURANE, PER MIN: Performed by: STUDENT IN AN ORGANIZED HEALTH CARE EDUCATION/TRAINING PROGRAM

## 2022-04-28 PROCEDURE — 83735 ASSAY OF MAGNESIUM: CPT | Performed by: STUDENT IN AN ORGANIZED HEALTH CARE EDUCATION/TRAINING PROGRAM

## 2022-04-28 PROCEDURE — 250N000013 HC RX MED GY IP 250 OP 250 PS 637: Performed by: ANESTHESIOLOGY

## 2022-04-28 PROCEDURE — 250N000011 HC RX IP 250 OP 636: Performed by: ANESTHESIOLOGY

## 2022-04-28 PROCEDURE — 250N000013 HC RX MED GY IP 250 OP 250 PS 637: Performed by: STUDENT IN AN ORGANIZED HEALTH CARE EDUCATION/TRAINING PROGRAM

## 2022-04-28 PROCEDURE — 370N000017 HC ANESTHESIA TECHNICAL FEE, PER MIN: Performed by: STUDENT IN AN ORGANIZED HEALTH CARE EDUCATION/TRAINING PROGRAM

## 2022-04-28 PROCEDURE — 999N000157 HC STATISTIC RCP TIME EA 10 MIN

## 2022-04-28 PROCEDURE — 28210 REPAIR/GRAFT OF FOOT TENDON: CPT | Mod: RT | Performed by: STUDENT IN AN ORGANIZED HEALTH CARE EDUCATION/TRAINING PROGRAM

## 2022-04-28 PROCEDURE — 15275 SKIN SUB GRAFT FACE/NK/HF/G: CPT | Mod: RT | Performed by: STUDENT IN AN ORGANIZED HEALTH CARE EDUCATION/TRAINING PROGRAM

## 2022-04-28 PROCEDURE — 710N000009 HC RECOVERY PHASE 1, LEVEL 1, PER MIN: Performed by: STUDENT IN AN ORGANIZED HEALTH CARE EDUCATION/TRAINING PROGRAM

## 2022-04-28 PROCEDURE — 64890 NRV GRF 1STRND HND/FOOT <4CM: CPT | Mod: RT | Performed by: STUDENT IN AN ORGANIZED HEALTH CARE EDUCATION/TRAINING PROGRAM

## 2022-04-28 PROCEDURE — 258N000003 HC RX IP 258 OP 636: Performed by: STUDENT IN AN ORGANIZED HEALTH CARE EDUCATION/TRAINING PROGRAM

## 2022-04-28 PROCEDURE — 85027 COMPLETE CBC AUTOMATED: CPT | Performed by: STUDENT IN AN ORGANIZED HEALTH CARE EDUCATION/TRAINING PROGRAM

## 2022-04-28 PROCEDURE — 250N000009 HC RX 250

## 2022-04-28 PROCEDURE — 999N000015 HC STATISTIC ARTERIAL MONITORING DAILY

## 2022-04-28 PROCEDURE — 88305 TISSUE EXAM BY PATHOLOGIST: CPT | Mod: 26 | Performed by: PATHOLOGY

## 2022-04-28 PROCEDURE — 250N000011 HC RX IP 250 OP 636

## 2022-04-28 PROCEDURE — 250N000009 HC RX 250: Performed by: ANESTHESIOLOGY

## 2022-04-28 PROCEDURE — 360N000078 HC SURGERY LEVEL 5, PER MIN: Performed by: STUDENT IN AN ORGANIZED HEALTH CARE EDUCATION/TRAINING PROGRAM

## 2022-04-28 PROCEDURE — 88305 TISSUE EXAM BY PATHOLOGIST: CPT | Mod: TC | Performed by: STUDENT IN AN ORGANIZED HEALTH CARE EDUCATION/TRAINING PROGRAM

## 2022-04-28 PROCEDURE — 84132 ASSAY OF SERUM POTASSIUM: CPT | Performed by: STUDENT IN AN ORGANIZED HEALTH CARE EDUCATION/TRAINING PROGRAM

## 2022-04-28 DEVICE — IMPLANTABLE DEVICE: Type: IMPLANTABLE DEVICE | Site: FOOT | Status: FUNCTIONAL

## 2022-04-28 DEVICE — IMP DEVICE ANASTOMOTIC 2.0MM COUPLER GREEN GEM2752: Type: IMPLANTABLE DEVICE | Site: FOOT | Status: FUNCTIONAL

## 2022-04-28 DEVICE — THE GEM MICROVASCULAR ANASTOMOTIC COUPLER DEVICE RINGS ARE MADE OF POLYETHYLENE AND SURGICAL GRADE STAINLESS STEEL PINS. A PROTECTIVE COVER AND JAW ASSEMBLY PROTECT THE RINGS AND ALLOW FOR EASY LOADING ONTO THE ANASTOMOTIC INSTRUMENT. BOTH THE PROTECTIVE COVER AND JAW ASSEMBLY ARE DISPOSABLE. THE GEM MICROVASCULAR ANASTOMOTIC COUPLER DEVICE IS SINGLE-USE AND AVAILABLE IN VARIOUS SIZES
Type: IMPLANTABLE DEVICE | Site: FOOT | Status: FUNCTIONAL
Brand: GEM MICROVASCULAR ANASTOMOTIC COUPLER

## 2022-04-28 RX ORDER — HYDROXYZINE HYDROCHLORIDE 25 MG/1
25 TABLET, FILM COATED ORAL EVERY 6 HOURS PRN
Status: DISCONTINUED | OUTPATIENT
Start: 2022-04-28 | End: 2022-05-06 | Stop reason: HOSPADM

## 2022-04-28 RX ORDER — GINSENG 100 MG
CAPSULE ORAL PRN
Status: DISCONTINUED | OUTPATIENT
Start: 2022-04-28 | End: 2022-04-28 | Stop reason: HOSPADM

## 2022-04-28 RX ORDER — SODIUM CHLORIDE, SODIUM LACTATE, POTASSIUM CHLORIDE, CALCIUM CHLORIDE 600; 310; 30; 20 MG/100ML; MG/100ML; MG/100ML; MG/100ML
INJECTION, SOLUTION INTRAVENOUS CONTINUOUS PRN
Status: DISCONTINUED | OUTPATIENT
Start: 2022-04-28 | End: 2022-04-28

## 2022-04-28 RX ORDER — ONDANSETRON 2 MG/ML
4 INJECTION INTRAMUSCULAR; INTRAVENOUS EVERY 6 HOURS PRN
Status: DISCONTINUED | OUTPATIENT
Start: 2022-04-28 | End: 2022-05-06 | Stop reason: HOSPADM

## 2022-04-28 RX ORDER — ACETAMINOPHEN 325 MG/1
975 TABLET ORAL EVERY 8 HOURS
Status: DISCONTINUED | OUTPATIENT
Start: 2022-04-28 | End: 2022-04-28

## 2022-04-28 RX ORDER — OXYCODONE HYDROCHLORIDE 10 MG/1
10 TABLET ORAL EVERY 4 HOURS PRN
Status: DISCONTINUED | OUTPATIENT
Start: 2022-04-28 | End: 2022-04-30

## 2022-04-28 RX ORDER — ACETAMINOPHEN 325 MG/1
975 TABLET ORAL ONCE
Status: COMPLETED | OUTPATIENT
Start: 2022-04-28 | End: 2022-04-28

## 2022-04-28 RX ORDER — ASPIRIN 300 MG/1
300 SUPPOSITORY RECTAL ONCE
Status: COMPLETED | OUTPATIENT
Start: 2022-04-28 | End: 2022-04-28

## 2022-04-28 RX ORDER — NALOXONE HYDROCHLORIDE 0.4 MG/ML
0.2 INJECTION, SOLUTION INTRAMUSCULAR; INTRAVENOUS; SUBCUTANEOUS
Status: DISCONTINUED | OUTPATIENT
Start: 2022-04-28 | End: 2022-05-06 | Stop reason: HOSPADM

## 2022-04-28 RX ORDER — PROCHLORPERAZINE MALEATE 5 MG
10 TABLET ORAL EVERY 6 HOURS PRN
Status: DISCONTINUED | OUTPATIENT
Start: 2022-04-28 | End: 2022-05-06 | Stop reason: HOSPADM

## 2022-04-28 RX ORDER — ENOXAPARIN SODIUM 100 MG/ML
40 INJECTION SUBCUTANEOUS EVERY 24 HOURS
Status: DISCONTINUED | OUTPATIENT
Start: 2022-04-29 | End: 2022-05-06 | Stop reason: HOSPADM

## 2022-04-28 RX ORDER — SODIUM CHLORIDE, SODIUM LACTATE, POTASSIUM CHLORIDE, CALCIUM CHLORIDE 600; 310; 30; 20 MG/100ML; MG/100ML; MG/100ML; MG/100ML
INJECTION, SOLUTION INTRAVENOUS CONTINUOUS
Status: DISCONTINUED | OUTPATIENT
Start: 2022-04-28 | End: 2022-04-28 | Stop reason: HOSPADM

## 2022-04-28 RX ORDER — DEXTROSE MONOHYDRATE, SODIUM CHLORIDE, AND POTASSIUM CHLORIDE 50; 1.49; 4.5 G/1000ML; G/1000ML; G/1000ML
INJECTION, SOLUTION INTRAVENOUS CONTINUOUS
Status: DISCONTINUED | OUTPATIENT
Start: 2022-04-28 | End: 2022-04-30

## 2022-04-28 RX ORDER — HYDROMORPHONE HCL IN WATER/PF 6 MG/30 ML
0.2 PATIENT CONTROLLED ANALGESIA SYRINGE INTRAVENOUS
Status: DISCONTINUED | OUTPATIENT
Start: 2022-04-28 | End: 2022-04-28

## 2022-04-28 RX ORDER — ASPIRIN 81 MG/1
81 TABLET, CHEWABLE ORAL DAILY
Status: DISCONTINUED | OUTPATIENT
Start: 2022-04-29 | End: 2022-05-06 | Stop reason: HOSPADM

## 2022-04-28 RX ORDER — HYDRALAZINE HYDROCHLORIDE 20 MG/ML
2.5-5 INJECTION INTRAMUSCULAR; INTRAVENOUS EVERY 10 MIN PRN
Status: DISCONTINUED | OUTPATIENT
Start: 2022-04-28 | End: 2022-04-28 | Stop reason: HOSPADM

## 2022-04-28 RX ORDER — OXYCODONE HYDROCHLORIDE 5 MG/1
5 TABLET ORAL EVERY 4 HOURS PRN
Status: DISCONTINUED | OUTPATIENT
Start: 2022-04-28 | End: 2022-04-30

## 2022-04-28 RX ORDER — HYDROXYZINE HYDROCHLORIDE 25 MG/1
25 TABLET, FILM COATED ORAL EVERY 6 HOURS PRN
Status: DISCONTINUED | OUTPATIENT
Start: 2022-04-28 | End: 2022-04-28

## 2022-04-28 RX ORDER — HYDROMORPHONE HYDROCHLORIDE 1 MG/ML
0.5 INJECTION, SOLUTION INTRAMUSCULAR; INTRAVENOUS; SUBCUTANEOUS EVERY 5 MIN PRN
Status: DISCONTINUED | OUTPATIENT
Start: 2022-04-28 | End: 2022-04-30

## 2022-04-28 RX ORDER — POLYETHYLENE GLYCOL 3350 17 G/17G
17 POWDER, FOR SOLUTION ORAL DAILY
Status: DISCONTINUED | OUTPATIENT
Start: 2022-04-29 | End: 2022-05-02

## 2022-04-28 RX ORDER — BISACODYL 10 MG
10 SUPPOSITORY, RECTAL RECTAL DAILY PRN
Status: DISCONTINUED | OUTPATIENT
Start: 2022-04-28 | End: 2022-05-06 | Stop reason: HOSPADM

## 2022-04-28 RX ORDER — CEFAZOLIN SODIUM/WATER 2 G/20 ML
2 SYRINGE (ML) INTRAVENOUS SEE ADMIN INSTRUCTIONS
Status: DISCONTINUED | OUTPATIENT
Start: 2022-04-28 | End: 2022-04-28 | Stop reason: HOSPADM

## 2022-04-28 RX ORDER — HYDROMORPHONE HCL IN WATER/PF 6 MG/30 ML
0.4 PATIENT CONTROLLED ANALGESIA SYRINGE INTRAVENOUS
Status: DISCONTINUED | OUTPATIENT
Start: 2022-04-28 | End: 2022-04-28

## 2022-04-28 RX ORDER — HYDROMORPHONE HCL IN WATER/PF 6 MG/30 ML
.2-.4 PATIENT CONTROLLED ANALGESIA SYRINGE INTRAVENOUS EVERY 10 MIN PRN
Status: DISCONTINUED | OUTPATIENT
Start: 2022-04-28 | End: 2022-04-28 | Stop reason: HOSPADM

## 2022-04-28 RX ORDER — NALOXONE HYDROCHLORIDE 0.4 MG/ML
0.4 INJECTION, SOLUTION INTRAMUSCULAR; INTRAVENOUS; SUBCUTANEOUS
Status: DISCONTINUED | OUTPATIENT
Start: 2022-04-28 | End: 2022-05-06 | Stop reason: HOSPADM

## 2022-04-28 RX ORDER — BACITRACIN ZINC 500 [USP'U]/G
OINTMENT TOPICAL DAILY
Status: DISCONTINUED | OUTPATIENT
Start: 2022-04-28 | End: 2022-04-28 | Stop reason: HOSPADM

## 2022-04-28 RX ORDER — PROPOFOL 10 MG/ML
INJECTION, EMULSION INTRAVENOUS CONTINUOUS PRN
Status: DISCONTINUED | OUTPATIENT
Start: 2022-04-28 | End: 2022-04-28

## 2022-04-28 RX ORDER — ACETAMINOPHEN 325 MG/1
650 TABLET ORAL EVERY 4 HOURS PRN
Status: DISCONTINUED | OUTPATIENT
Start: 2022-05-01 | End: 2022-04-28

## 2022-04-28 RX ORDER — CALCIUM CARBONATE 500 MG/1
500 TABLET, CHEWABLE ORAL 4 TIMES DAILY PRN
Status: DISCONTINUED | OUTPATIENT
Start: 2022-04-28 | End: 2022-05-06 | Stop reason: HOSPADM

## 2022-04-28 RX ORDER — MEPERIDINE HYDROCHLORIDE 25 MG/ML
12.5 INJECTION INTRAMUSCULAR; INTRAVENOUS; SUBCUTANEOUS
Status: DISCONTINUED | OUTPATIENT
Start: 2022-04-28 | End: 2022-04-28 | Stop reason: HOSPADM

## 2022-04-28 RX ORDER — LIDOCAINE 40 MG/G
CREAM TOPICAL
Status: DISCONTINUED | OUTPATIENT
Start: 2022-04-28 | End: 2022-04-28 | Stop reason: HOSPADM

## 2022-04-28 RX ORDER — ONDANSETRON 2 MG/ML
INJECTION INTRAMUSCULAR; INTRAVENOUS PRN
Status: DISCONTINUED | OUTPATIENT
Start: 2022-04-28 | End: 2022-04-28

## 2022-04-28 RX ORDER — ACETAMINOPHEN 325 MG/1
975 TABLET ORAL EVERY 6 HOURS PRN
Status: DISCONTINUED | OUTPATIENT
Start: 2022-04-28 | End: 2022-04-30

## 2022-04-28 RX ORDER — GABAPENTIN 300 MG/1
300 CAPSULE ORAL ONCE
Status: COMPLETED | OUTPATIENT
Start: 2022-04-28 | End: 2022-04-28

## 2022-04-28 RX ORDER — AMOXICILLIN 250 MG
1 CAPSULE ORAL 2 TIMES DAILY
Status: DISCONTINUED | OUTPATIENT
Start: 2022-04-28 | End: 2022-05-06 | Stop reason: HOSPADM

## 2022-04-28 RX ORDER — LABETALOL HYDROCHLORIDE 5 MG/ML
5-10 INJECTION, SOLUTION INTRAVENOUS
Status: DISCONTINUED | OUTPATIENT
Start: 2022-04-28 | End: 2022-04-28 | Stop reason: HOSPADM

## 2022-04-28 RX ORDER — CEFAZOLIN SODIUM 2 G/100ML
2 INJECTION, SOLUTION INTRAVENOUS EVERY 8 HOURS
Status: COMPLETED | OUTPATIENT
Start: 2022-04-28 | End: 2022-05-01

## 2022-04-28 RX ORDER — FENTANYL CITRATE 50 UG/ML
25-50 INJECTION, SOLUTION INTRAMUSCULAR; INTRAVENOUS
Status: DISCONTINUED | OUTPATIENT
Start: 2022-04-28 | End: 2022-04-28 | Stop reason: HOSPADM

## 2022-04-28 RX ORDER — ONDANSETRON 4 MG/1
4 TABLET, ORALLY DISINTEGRATING ORAL EVERY 30 MIN PRN
Status: DISCONTINUED | OUTPATIENT
Start: 2022-04-28 | End: 2022-04-28 | Stop reason: HOSPADM

## 2022-04-28 RX ORDER — DEXAMETHASONE SODIUM PHOSPHATE 4 MG/ML
INJECTION, SOLUTION INTRA-ARTICULAR; INTRALESIONAL; INTRAMUSCULAR; INTRAVENOUS; SOFT TISSUE PRN
Status: DISCONTINUED | OUTPATIENT
Start: 2022-04-28 | End: 2022-04-28

## 2022-04-28 RX ORDER — OXYCODONE HYDROCHLORIDE 5 MG/1
5 TABLET ORAL EVERY 4 HOURS PRN
Status: DISCONTINUED | OUTPATIENT
Start: 2022-04-28 | End: 2022-04-28 | Stop reason: HOSPADM

## 2022-04-28 RX ORDER — GABAPENTIN 100 MG/1
100 CAPSULE ORAL 3 TIMES DAILY
Status: DISCONTINUED | OUTPATIENT
Start: 2022-04-28 | End: 2022-04-30

## 2022-04-28 RX ORDER — ONDANSETRON 4 MG/1
4 TABLET, ORALLY DISINTEGRATING ORAL EVERY 6 HOURS PRN
Status: DISCONTINUED | OUTPATIENT
Start: 2022-04-28 | End: 2022-05-06 | Stop reason: HOSPADM

## 2022-04-28 RX ORDER — GLYCOPYRROLATE 0.2 MG/ML
INJECTION, SOLUTION INTRAMUSCULAR; INTRAVENOUS PRN
Status: DISCONTINUED | OUTPATIENT
Start: 2022-04-28 | End: 2022-04-28

## 2022-04-28 RX ORDER — CEFAZOLIN SODIUM/WATER 2 G/20 ML
2 SYRINGE (ML) INTRAVENOUS
Status: COMPLETED | OUTPATIENT
Start: 2022-04-28 | End: 2022-04-28

## 2022-04-28 RX ORDER — RIZATRIPTAN BENZOATE 5 MG/1
5 TABLET ORAL
Status: COMPLETED | OUTPATIENT
Start: 2022-04-28 | End: 2022-04-30

## 2022-04-28 RX ORDER — FENTANYL CITRATE 50 UG/ML
25-50 INJECTION, SOLUTION INTRAMUSCULAR; INTRAVENOUS EVERY 5 MIN PRN
Status: DISCONTINUED | OUTPATIENT
Start: 2022-04-28 | End: 2022-04-28 | Stop reason: HOSPADM

## 2022-04-28 RX ORDER — PROPOFOL 10 MG/ML
INJECTION, EMULSION INTRAVENOUS PRN
Status: DISCONTINUED | OUTPATIENT
Start: 2022-04-28 | End: 2022-04-28

## 2022-04-28 RX ORDER — EPHEDRINE SULFATE 50 MG/ML
INJECTION, SOLUTION INTRAMUSCULAR; INTRAVENOUS; SUBCUTANEOUS PRN
Status: DISCONTINUED | OUTPATIENT
Start: 2022-04-28 | End: 2022-04-28

## 2022-04-28 RX ORDER — HYDROMORPHONE HYDROCHLORIDE 1 MG/ML
.3-.5 INJECTION, SOLUTION INTRAMUSCULAR; INTRAVENOUS; SUBCUTANEOUS
Status: DISCONTINUED | OUTPATIENT
Start: 2022-04-28 | End: 2022-04-30

## 2022-04-28 RX ORDER — FENTANYL CITRATE 50 UG/ML
INJECTION, SOLUTION INTRAMUSCULAR; INTRAVENOUS PRN
Status: DISCONTINUED | OUTPATIENT
Start: 2022-04-28 | End: 2022-04-28

## 2022-04-28 RX ORDER — ONDANSETRON 2 MG/ML
4 INJECTION INTRAMUSCULAR; INTRAVENOUS EVERY 30 MIN PRN
Status: DISCONTINUED | OUTPATIENT
Start: 2022-04-28 | End: 2022-04-28 | Stop reason: HOSPADM

## 2022-04-28 RX ADMIN — FENTANYL CITRATE 25 MCG: 50 INJECTION, SOLUTION INTRAMUSCULAR; INTRAVENOUS at 16:19

## 2022-04-28 RX ADMIN — ROCURONIUM BROMIDE 20 MG: 50 INJECTION, SOLUTION INTRAVENOUS at 09:54

## 2022-04-28 RX ADMIN — ROCURONIUM BROMIDE 30 MG: 50 INJECTION, SOLUTION INTRAVENOUS at 08:32

## 2022-04-28 RX ADMIN — ACETAMINOPHEN 975 MG: 325 TABLET ORAL at 06:34

## 2022-04-28 RX ADMIN — SODIUM CHLORIDE, POTASSIUM CHLORIDE, SODIUM LACTATE AND CALCIUM CHLORIDE: 600; 310; 30; 20 INJECTION, SOLUTION INTRAVENOUS at 06:30

## 2022-04-28 RX ADMIN — HYDROMORPHONE HYDROCHLORIDE 0.5 MG: 1 INJECTION, SOLUTION INTRAMUSCULAR; INTRAVENOUS; SUBCUTANEOUS at 21:39

## 2022-04-28 RX ADMIN — POTASSIUM CHLORIDE, DEXTROSE MONOHYDRATE AND SODIUM CHLORIDE: 150; 5; 450 INJECTION, SOLUTION INTRAVENOUS at 19:57

## 2022-04-28 RX ADMIN — SENNOSIDES AND DOCUSATE SODIUM 1 TABLET: 8.6; 5 TABLET ORAL at 21:48

## 2022-04-28 RX ADMIN — ROCURONIUM BROMIDE 30 MG: 50 INJECTION, SOLUTION INTRAVENOUS at 16:19

## 2022-04-28 RX ADMIN — PHENYLEPHRINE HYDROCHLORIDE 100 MCG: 10 INJECTION INTRAVENOUS at 09:32

## 2022-04-28 RX ADMIN — SODIUM CHLORIDE, POTASSIUM CHLORIDE, SODIUM LACTATE AND CALCIUM CHLORIDE: 600; 310; 30; 20 INJECTION, SOLUTION INTRAVENOUS at 18:52

## 2022-04-28 RX ADMIN — ROCURONIUM BROMIDE 30 MG: 50 INJECTION, SOLUTION INTRAVENOUS at 15:29

## 2022-04-28 RX ADMIN — ROCURONIUM BROMIDE 10 MG: 50 INJECTION, SOLUTION INTRAVENOUS at 09:26

## 2022-04-28 RX ADMIN — FENTANYL CITRATE 25 MCG: 50 INJECTION, SOLUTION INTRAMUSCULAR; INTRAVENOUS at 17:10

## 2022-04-28 RX ADMIN — PROCHLORPERAZINE EDISYLATE 10 MG: 5 INJECTION INTRAMUSCULAR; INTRAVENOUS at 21:37

## 2022-04-28 RX ADMIN — FENTANYL CITRATE 50 MCG: 50 INJECTION, SOLUTION INTRAMUSCULAR; INTRAVENOUS at 18:19

## 2022-04-28 RX ADMIN — FENTANYL CITRATE 50 MCG: 50 INJECTION, SOLUTION INTRAMUSCULAR; INTRAVENOUS at 15:21

## 2022-04-28 RX ADMIN — FENTANYL CITRATE 50 MCG: 50 INJECTION, SOLUTION INTRAMUSCULAR; INTRAVENOUS at 17:20

## 2022-04-28 RX ADMIN — Medication 2 G: at 07:45

## 2022-04-28 RX ADMIN — PROPOFOL 20 MCG/KG/MIN: 10 INJECTION, EMULSION INTRAVENOUS at 09:19

## 2022-04-28 RX ADMIN — FENTANYL CITRATE 25 MCG: 50 INJECTION, SOLUTION INTRAMUSCULAR; INTRAVENOUS at 17:59

## 2022-04-28 RX ADMIN — ROCURONIUM BROMIDE 10 MG: 50 INJECTION, SOLUTION INTRAVENOUS at 09:03

## 2022-04-28 RX ADMIN — DEXAMETHASONE SODIUM PHOSPHATE 10 MG: 4 INJECTION, SOLUTION INTRA-ARTICULAR; INTRALESIONAL; INTRAMUSCULAR; INTRAVENOUS; SOFT TISSUE at 08:23

## 2022-04-28 RX ADMIN — GLYCOPYRROLATE 0.1 MG: 0.2 INJECTION, SOLUTION INTRAMUSCULAR; INTRAVENOUS at 12:25

## 2022-04-28 RX ADMIN — Medication 5 MG: at 07:59

## 2022-04-28 RX ADMIN — HYDROMORPHONE HYDROCHLORIDE 0.2 MG: 0.2 INJECTION, SOLUTION INTRAMUSCULAR; INTRAVENOUS; SUBCUTANEOUS at 19:15

## 2022-04-28 RX ADMIN — ONDANSETRON 4 MG: 2 INJECTION INTRAMUSCULAR; INTRAVENOUS at 18:59

## 2022-04-28 RX ADMIN — Medication 5 MG: at 11:03

## 2022-04-28 RX ADMIN — FENTANYL CITRATE 25 MCG: 50 INJECTION, SOLUTION INTRAMUSCULAR; INTRAVENOUS at 13:04

## 2022-04-28 RX ADMIN — GABAPENTIN 300 MG: 300 CAPSULE ORAL at 06:34

## 2022-04-28 RX ADMIN — ROCURONIUM BROMIDE 10 MG: 50 INJECTION, SOLUTION INTRAVENOUS at 11:29

## 2022-04-28 RX ADMIN — SUGAMMADEX 200 MG: 100 INJECTION, SOLUTION INTRAVENOUS at 16:50

## 2022-04-28 RX ADMIN — ROCURONIUM BROMIDE 10 MG: 50 INJECTION, SOLUTION INTRAVENOUS at 12:40

## 2022-04-28 RX ADMIN — FENTANYL CITRATE 150 MCG: 50 INJECTION, SOLUTION INTRAMUSCULAR; INTRAVENOUS at 07:40

## 2022-04-28 RX ADMIN — HYDROMORPHONE HYDROCHLORIDE 0.5 MG: 1 INJECTION, SOLUTION INTRAMUSCULAR; INTRAVENOUS; SUBCUTANEOUS at 20:07

## 2022-04-28 RX ADMIN — ROCURONIUM BROMIDE 20 MG: 50 INJECTION, SOLUTION INTRAVENOUS at 12:03

## 2022-04-28 RX ADMIN — HYDROMORPHONE HYDROCHLORIDE 0.2 MG: 1 INJECTION, SOLUTION INTRAMUSCULAR; INTRAVENOUS; SUBCUTANEOUS at 11:31

## 2022-04-28 RX ADMIN — PHENYLEPHRINE HYDROCHLORIDE 100 MCG: 10 INJECTION INTRAVENOUS at 08:30

## 2022-04-28 RX ADMIN — ONDANSETRON 4 MG: 2 INJECTION INTRAMUSCULAR; INTRAVENOUS at 16:45

## 2022-04-28 RX ADMIN — PHENYLEPHRINE HYDROCHLORIDE 100 MCG: 10 INJECTION INTRAVENOUS at 11:03

## 2022-04-28 RX ADMIN — Medication 2 G: at 15:48

## 2022-04-28 RX ADMIN — FENTANYL CITRATE 50 MCG: 50 INJECTION, SOLUTION INTRAMUSCULAR; INTRAVENOUS at 10:00

## 2022-04-28 RX ADMIN — ACETAMINOPHEN 975 MG: 325 TABLET ORAL at 22:05

## 2022-04-28 RX ADMIN — SODIUM CHLORIDE, POTASSIUM CHLORIDE, SODIUM LACTATE AND CALCIUM CHLORIDE: 600; 310; 30; 20 INJECTION, SOLUTION INTRAVENOUS at 07:35

## 2022-04-28 RX ADMIN — ROCURONIUM BROMIDE 50 MG: 50 INJECTION, SOLUTION INTRAVENOUS at 07:41

## 2022-04-28 RX ADMIN — HYDROMORPHONE HYDROCHLORIDE 0.2 MG: 0.2 INJECTION, SOLUTION INTRAMUSCULAR; INTRAVENOUS; SUBCUTANEOUS at 18:52

## 2022-04-28 RX ADMIN — HYDROMORPHONE HYDROCHLORIDE 0.2 MG: 0.2 INJECTION, SOLUTION INTRAMUSCULAR; INTRAVENOUS; SUBCUTANEOUS at 18:57

## 2022-04-28 RX ADMIN — FENTANYL CITRATE 25 MCG: 50 INJECTION, SOLUTION INTRAMUSCULAR; INTRAVENOUS at 12:55

## 2022-04-28 RX ADMIN — ROCURONIUM BROMIDE 30 MG: 50 INJECTION, SOLUTION INTRAVENOUS at 13:02

## 2022-04-28 RX ADMIN — PROPOFOL 200 MG: 10 INJECTION, EMULSION INTRAVENOUS at 07:40

## 2022-04-28 RX ADMIN — GABAPENTIN 100 MG: 100 CAPSULE ORAL at 21:48

## 2022-04-28 RX ADMIN — OXYCODONE HYDROCHLORIDE 5 MG: 5 TABLET ORAL at 22:33

## 2022-04-28 RX ADMIN — HYDROMORPHONE HYDROCHLORIDE 0.2 MG: 0.2 INJECTION, SOLUTION INTRAMUSCULAR; INTRAVENOUS; SUBCUTANEOUS at 19:10

## 2022-04-28 RX ADMIN — HYDROMORPHONE HYDROCHLORIDE 0.3 MG: 1 INJECTION, SOLUTION INTRAMUSCULAR; INTRAVENOUS; SUBCUTANEOUS at 10:35

## 2022-04-28 RX ADMIN — FENTANYL CITRATE 25 MCG: 50 INJECTION, SOLUTION INTRAMUSCULAR; INTRAVENOUS at 18:07

## 2022-04-28 RX ADMIN — Medication 2 G: at 11:45

## 2022-04-28 RX ADMIN — HYDROMORPHONE HYDROCHLORIDE 0.5 MG: 1 INJECTION, SOLUTION INTRAMUSCULAR; INTRAVENOUS; SUBCUTANEOUS at 20:22

## 2022-04-28 RX ADMIN — ROCURONIUM BROMIDE 10 MG: 50 INJECTION, SOLUTION INTRAVENOUS at 14:25

## 2022-04-28 RX ADMIN — ROCURONIUM BROMIDE 10 MG: 50 INJECTION, SOLUTION INTRAVENOUS at 13:56

## 2022-04-28 RX ADMIN — FENTANYL CITRATE 50 MCG: 50 INJECTION, SOLUTION INTRAMUSCULAR; INTRAVENOUS at 08:34

## 2022-04-28 RX ADMIN — HYDROMORPHONE HYDROCHLORIDE 0.2 MG: 0.2 INJECTION, SOLUTION INTRAMUSCULAR; INTRAVENOUS; SUBCUTANEOUS at 19:46

## 2022-04-28 RX ADMIN — SODIUM CHLORIDE, POTASSIUM CHLORIDE, SODIUM LACTATE AND CALCIUM CHLORIDE: 600; 310; 30; 20 INJECTION, SOLUTION INTRAVENOUS at 12:30

## 2022-04-28 RX ADMIN — ROCURONIUM BROMIDE 10 MG: 50 INJECTION, SOLUTION INTRAVENOUS at 11:46

## 2022-04-28 RX ADMIN — HYDROMORPHONE HYDROCHLORIDE 0.5 MG: 1 INJECTION, SOLUTION INTRAMUSCULAR; INTRAVENOUS; SUBCUTANEOUS at 16:54

## 2022-04-28 RX ADMIN — PHENYLEPHRINE HYDROCHLORIDE 100 MCG: 10 INJECTION INTRAVENOUS at 08:06

## 2022-04-28 RX ADMIN — MIDAZOLAM 2 MG: 1 INJECTION INTRAMUSCULAR; INTRAVENOUS at 07:30

## 2022-04-28 RX ADMIN — Medication 5 MG: at 09:35

## 2022-04-28 RX ADMIN — ROCURONIUM BROMIDE 30 MG: 50 INJECTION, SOLUTION INTRAVENOUS at 10:34

## 2022-04-28 RX ADMIN — ROCURONIUM BROMIDE 10 MG: 50 INJECTION, SOLUTION INTRAVENOUS at 14:50

## 2022-04-28 ASSESSMENT — ACTIVITIES OF DAILY LIVING (ADL)
ADLS_ACUITY_SCORE: 9
ADLS_ACUITY_SCORE: 5
ADLS_ACUITY_SCORE: 9
ADLS_ACUITY_SCORE: 5
ADLS_ACUITY_SCORE: 9
ADLS_ACUITY_SCORE: 9
ADLS_ACUITY_SCORE: 5
ADLS_ACUITY_SCORE: 9
ADLS_ACUITY_SCORE: 5
ADLS_ACUITY_SCORE: 5

## 2022-04-28 NOTE — LETTER
[facility] [address]  [address] [facility] [address]  [phone]          May 6, 2022    RE:  Reyna Martinez                                                                                                                                                       [address] [facility] [address]      You will continue a dangling protocol at home, which involves progressively increasing dangle of the right foot with each subsequent day. It is important to keep the RIGHT foot elevated AT ALL TIMES unless dangling. Also, if the flap becomes looking congested or changes color, be conservative and put the foot back up regardless of the allotted dangling time.    5/6: 45 minutes dangle (off side of bed/couch) three times a day, spaced out by 6-7 hours  5/7: 55 minutes dangle, three times a day, spaced out by 6-7 hours  5/8: 65 minutes dangle, three times a day, spaced out by 6-7 hours  5/9: 75 minutes dangle, three times a day, spaced out by 6-7 hours  5/10: 85 minutes dangle, three time a day, spaced out by 6-7 hours  5/11: 95 minutes dangle, three times a day, spaced out by 6-7 hours  5/12: 105 minutes dangle, three times a day, spaced out by 6-7 hours  5/12: 120 minutes dangle, three times a day, spaced out by 6-7 hours    You will be seen in clinic at [place] next Friday on 5/13.    You can use the scooter for short (few minute) periods at a time.    Please move the angle while sedentary to prevent blood clots in the right leg.     You can gently push off with the right foot as needed, but otherwise the right foot is non-weight-bearing. Avoid to much pressure on the great toe as there was a tendon reconstruction done.    Continue taking the daily aspirin 81 mg for the next 21 days.          Thom Adames MD, PhD  Staff Plastic Surgeon

## 2022-04-28 NOTE — ANESTHESIA PROCEDURE NOTES
Airway       Patient location during procedure: OR       Procedure Start/Stop Times: 4/28/2022 7:42 AM  Staff -        CRNA: Sherin Garcia APRN CRNA       Performed By: CRNA  Consent for Airway        Urgency: elective  Indications and Patient Condition       Indications for airway management: haim-procedural       Induction type:intravenous       Mask difficulty assessment: 1 - vent by mask    Final Airway Details       Final airway type: endotracheal airway       Successful airway: ETT - single  Endotracheal Airway Details        ETT size (mm): 7.0       Cuffed: yes       Successful intubation technique: direct laryngoscopy       DL Blade Type: Barrios 2       Grade View of Cords: 1       Adjucts: stylet       Position: Right       Measured from: lips       Secured at (cm): 22       Bite block used: None    Post intubation assessment        Placement verified by: capnometry, equal breath sounds and chest rise        Number of attempts at approach: 1       Secured with: pink tape       Ease of procedure: easy       Dentition: Intact and Unchanged    Medication(s) Administered   Medication Administration Time: 4/28/2022 7:42 AM

## 2022-04-28 NOTE — BRIEF OP NOTE
"[facility]    Brief Operative Note    Pre-operative diagnosis: Open wound of right lower extremity, subsequent encounter [Q51.628K]  Post-operative diagnosis Same as pre-operative diagnosis    Procedure: Procedure(s):  Right dorsal foot irrigation and debridement  Right great toe extensor tendon reconstruction with use of tendon graft from the ipsilateral foot  Free flap taken from either groin or forearm, Possible open vein graft harvest from either foot or forearm possible placement of Integra  Possible split thickness skin graft from either thigh  Surgeon: Surgeon(s) and Role:     * Thom Adames MD - Primary     * Aditya Pinto MD - Resident - Assisting     * Todd Johnson MD - Resident - Assisting     * Raoul Pereyra MD - Resident - Assisting  Anesthesia: General   Estimated Blood Loss: 100 cc    Drains: None  Specimens:   ID Type Source Tests Collected by Time Destination   1 : Right Great Toe Additional Inferior Skin margin Tissue Other SURGICAL PATHOLOGY EXAM Thom Adames MD 2022  8:36 AM    2 : Right Great Toe Additional Deep Eliecer Tissue Other SURGICAL PATHOLOGY EXAM Thom Adames MD 2022  8:38 AM      Findings:   Extensor tendon absent on right great toe and open wound.  Complications: None.  Implants:   Implant Name Type Inv. Item Serial No.  Lot No. LRB No. Used Action   IMP DEVICE ANASTOMOTIC 1.0MM  GREY [id number] - [id number] Other IMP DEVICE ANASTOMOTIC 1.0MM  GREY [id number]  [employer] [id number] Right 1 Implanted   IMP DEVICE ANASTOMOTIC 2.0MM  GREEN [id number] - [id number] Other IMP DEVICE ANASTOMOTIC 2.0MM  GREEN [id number]  [employer] [id number] Right 1 Implanted   DRSG MATRIX BILAYER 2X2\" [id number] CHRG PER SQ CM= 25 UNITS - [id number]  DRSG MATRIX BILAYER 2X2\" [id number] CHRG PER SQ CM= 25 UNITS  [employer] [id number] N/A 1 Implanted   IMP DEVICE ANASTOMOTIC 1.5MM "  BLUE [id number] - [id number] Other IMP DEVICE ANASTOMOTIC 1.5MM  BLUE [id number]  [employer] [id number] N/A 1 Implanted

## 2022-04-28 NOTE — ANESTHESIA PREPROCEDURE EVALUATION
Anesthesia Pre-Procedure Evaluation    Patient: Reyna Martinez   MRN: [id number] : 1990        Procedure : Procedure(s):  Right dorsal foot irrigation and debridement  Right great toe extensor tendon reconstruction with use of tendon graft from the ipsilateral foot  Free flap taken from either groin or forearm, Possible open vein graft harvest from either foot or forearm possible placement of Integra  Possible split thickness skin graft from either thigh          History reviewed. No pertinent past medical history.   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, Application of VAC Dressing;  Surgeon: Raoul Seals MD;  Location: [facility] OR     EXCISE MASS TOE Right 2019    Procedure: Excision of mass Right great toe;  Surgeon: Raoul Seals MD;  Location: [facility] OR     GRAFT SKIN SPLIT THICKNESS FROM EXTREMITY Right 2019    Procedure: with right groin skin graft;  Surgeon: Renato Tinoco MD;  [facility]: [facility] OR     GRAFT SKIN SPLIT THICKNESS FROM EXTREMITY Right 2019    Procedure: Right toe biologics dressing;  Surgeon: Renato Tinoco MD;  Location: [facility] OR     IRRIGATION AND DEBRIDEMENT LOWER EXTREMITY, COMBINED Right 2019    Procedure: Right toe wound coverage;  Surgeon: Renato Tinoco MD;  Location: [facility] OR      No Known Allergies   Social History     Tobacco Use     Smoking status: Never Smoker     Smokeless tobacco: Never Used   Substance Use Topics     Alcohol use: Never      Wt Readings from Last 1 Encounters:   22 65.3 kg (143 lb 15.4 oz)        Anesthesia Evaluation   Pt has had prior anesthetic.     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      GI/Hepatic:  - neg GI/hepatic ROS     Renal/Genitourinary:  - neg Renal ROS     Endo:  - neg endo ROS     Psychiatric/Substance Use:       Infectious Disease:       Malignancy:       Other:            Physical Exam    Airway        Mallampati: I   TM distance: > 3 FB   Neck ROM: full   Mouth opening: > 3 cm    Respiratory Devices and Support         Dental  no notable dental history         Cardiovascular          Rhythm and rate: regular and normal     Pulmonary           breath sounds clear to auscultation           OUTSIDE LABS:  CBC:   Lab Results   Component Value Date    HGB 13.4 12/06/2019     BMP:   Lab Results   Component Value Date    GLC 81 04/28/2022    GLC 73 04/15/2022     COAGS: No results found for: PTT, INR, FIBR  POC:   Lab Results   Component Value Date    BGM 72 12/30/2019    HCG Negative 12/30/2019     HEPATIC: No results found for: ALBUMIN, PROTTOTAL, ALT, AST, GGT, ALKPHOS, BILITOTAL, BILIDIRECT, MERRICK  OTHER: No results found for: PH, LACT, A1C, DAVIDA, PHOS, MAG, LIPASE, AMYLASE, TSH, T4, T3, CRP, SED    Anesthesia Plan    ASA Status:  2   NPO Status:  NPO Appropriate    Anesthesia Type: General.     - Airway: ETT         Techniques and Equipment:     - Lines/Monitors: 2nd IV, Arterial Line     Consents    Anesthesia Plan(s) and associated risks, benefits, and realistic alternatives discussed. Questions answered and patient/representative(s) expressed understanding.    - Discussed:     - Discussed with:  Patient      - Extended Intubation/Ventilatory Support Discussed: No.      - Patient is DNR/DNI Status: No    Use of blood products discussed: Yes.     - Discussed with: Patient.     - Consented: consented to blood products            Reason for refusal: other.     Postoperative Care            Comments:                Shravan Farris MD

## 2022-04-28 NOTE — ANESTHESIA CARE TRANSFER NOTE
Patient: Reyna Martinez    Procedure: Procedure(s):  Right dorsal foot irrigation and debridement  Right great toe extensor tendon reconstruction with use of tendon graft from the ipsilateral foot  Free flap taken from either groin or forearm, Possible open vein graft harvest from either foot or forearm possible placement of Integra       Diagnosis: Open wound of right lower extremity, subsequent encounter [I43.195K]  Diagnosis Additional Information: No value filed.    Anesthesia Type:   General     Note:    Oropharynx: oropharynx clear of all foreign objects and spontaneously breathing  Level of Consciousness: awake  Oxygen Supplementation: nasal cannula  Level of Supplemental Oxygen (L/min / FiO2): 2  Independent Airway: airway patency satisfactory and stable  Dentition: dentition unchanged  Vital Signs Stable: post-procedure vital signs reviewed and stable  Report to RN Given: handoff report given  Patient transferred to: PACU    Handoff Report: Identifed the Patient, Identified the Reponsible Provider, Reviewed the pertinent medical history, Discussed the surgical course, Reviewed Intra-OP anesthesia mangement and issues during anesthesia, Set expectations for post-procedure period and Allowed opportunity for questions and acknowledgement of understanding      Vitals:  Vitals Value Taken Time   /73    Temp     Pulse 96 04/28/22 1726   Resp 16    SpO2 100 % 04/28/22 1726   Vitals shown include unvalidated device data.    Electronically Signed By: TENZIN Nevarez CRNA  April 28, 2022  5:27 PM

## 2022-04-28 NOTE — H&P
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 No changes in history or exam. Only discussion has been surrounding actual diagnosis. Two senior pathologists have examined specimens and there is disagreement. Nonetheless, the lesion is locally indolent growing and benign. On pathology, the lesion extends to the inferior and deep margins.     Patient is with wound VAC on the R foot. Proximal extensor tendon is tagged with Prolene. Palpable R DP/PT pulses. Patient with normal L Dagoberto's test with occlusion of radial artery with no scars on the forearm. No PL present bilaterally on exam. R thigh with preferred donor site for skin graft, with no scars.     OR today for R great toe and dorsal foot irrigation and debridement (including additional margin excision), great toe extensor tendon reconstruction with use of tendon graft, radial forearm free flap from the left side, likely split thickness skin grafting from the right thigh.     Discussed the risks of surgery again, including but not limited to: infection, bleeding, pain, poor scarring, injury to nerves and tendons, free flap loss, need for return to OR, need for additional surgery, wound healing issues, need for blood transfusion, need for leeching, DVT/PE, death. Despite these risks, patient consents to and would like to proceed with surgery.    Patient and  both understand the complexity of the reconstruction and need for hospital stay for flap monitoring and slow dangling protocol, which has been explained. We will try to find additional veins to improve outflow if anatomy allows for this, as it may help accelerate dangling. Patient and  understand and are appreciative. All questions answered. Consented. Marked.     Thom Adames MD, PhD

## 2022-04-28 NOTE — ANESTHESIA PROCEDURE NOTES
Arterial Line Procedure Note    Pre-Procedure   Staff -        Anesthesiologist:  Shravan Farris MD       Performed By: anesthesiologist       Location: OR       Pre-Anesthestic Checklist: patient identified, IV checked, risks and benefits discussed, informed consent, monitors and equipment checked, pre-op evaluation and at physician/surgeon's request  Timeout:       Correct Patient: Yes        Correct Procedure: Yes        Correct Site: Yes        Correct Position: Yes   Line Placement:   This line was placed Post Induction starting at 4/28/2022 7:45 AM and ending at 4/28/2022 7:55 AM  Procedure   Procedure: arterial line, new line and elective       Laterality: right       Insertion Site: radial.  Sterile Prep        Standard elements of sterile barrier followed       Skin prep: Chloraprep  Insertion/Injection        Technique: ultrasound guided        1. Ultrasound was used to evaluate the access site.       2. Artery evaluated via ultrasound for patency/adequacy.       3. Using real-time ultrasound the needle/catheter was observed entering the artery/vein.       Catheter Type/Size: 20 G, 1.75 in/4.5 cm quick cath (integral wire)  Narrative         Secured by: suture       Tegaderm dressing used.       Complications: None apparent,        Arterial waveform: Yes

## 2022-04-29 LAB
ANION GAP SERPL CALCULATED.3IONS-SCNC: 5 MMOL/L (ref 3–14)
BUN SERPL-MCNC: 7 MG/DL (ref 7–30)
CALCIUM SERPL-MCNC: 8.2 MG/DL (ref 8.5–10.1)
CHLORIDE BLD-SCNC: 110 MMOL/L (ref 94–109)
CO2 SERPL-SCNC: 26 MMOL/L (ref 20–32)
CREAT SERPL-MCNC: 0.56 MG/DL (ref 0.52–1.04)
ERYTHROCYTE [DISTWIDTH] IN BLOOD BY AUTOMATED COUNT: 12.9 % (ref 10–15)
GFR SERPL CREATININE-BSD FRML MDRD: >90 ML/MIN/1.73M2
GLUCOSE BLD-MCNC: 101 MG/DL (ref 70–99)
GLUCOSE BLDC GLUCOMTR-MCNC: 110 MG/DL (ref 70–99)
HCT VFR BLD AUTO: 30.1 % (ref 35–47)
HGB BLD-MCNC: 9.9 G/DL (ref 11.7–15.7)
MAGNESIUM SERPL-MCNC: 2.1 MG/DL (ref 1.6–2.3)
MCH RBC QN AUTO: 30.6 PG (ref 26.5–33)
MCHC RBC AUTO-ENTMCNC: 32.9 G/DL (ref 31.5–36.5)
MCV RBC AUTO: 93 FL (ref 78–100)
PLATELET # BLD AUTO: 239 10E3/UL (ref 150–450)
POTASSIUM BLD-SCNC: 4 MMOL/L (ref 3.4–5.3)
RBC # BLD AUTO: 3.24 10E6/UL (ref 3.8–5.2)
SODIUM SERPL-SCNC: 141 MMOL/L (ref 133–144)
WBC # BLD AUTO: 9.8 10E3/UL (ref 4–11)

## 2022-04-29 PROCEDURE — 0HBEXZZ EXCISION OF LEFT LOWER ARM SKIN, EXTERNAL APPROACH: ICD-10-PCS | Performed by: STUDENT IN AN ORGANIZED HEALTH CARE EDUCATION/TRAINING PROGRAM

## 2022-04-29 PROCEDURE — 250N000013 HC RX MED GY IP 250 OP 250 PS 637: Performed by: STUDENT IN AN ORGANIZED HEALTH CARE EDUCATION/TRAINING PROGRAM

## 2022-04-29 PROCEDURE — 36415 COLL VENOUS BLD VENIPUNCTURE: CPT | Performed by: STUDENT IN AN ORGANIZED HEALTH CARE EDUCATION/TRAINING PROGRAM

## 2022-04-29 PROCEDURE — 0HREXK3 REPLACEMENT OF LEFT LOWER ARM SKIN WITH NONAUTOLOGOUS TISSUE SUBSTITUTE, FULL THICKNESS, EXTERNAL APPROACH: ICD-10-PCS | Performed by: STUDENT IN AN ORGANIZED HEALTH CARE EDUCATION/TRAINING PROGRAM

## 2022-04-29 PROCEDURE — 80048 BASIC METABOLIC PNL TOTAL CA: CPT | Performed by: STUDENT IN AN ORGANIZED HEALTH CARE EDUCATION/TRAINING PROGRAM

## 2022-04-29 PROCEDURE — 0LBV0ZZ EXCISION OF RIGHT FOOT TENDON, OPEN APPROACH: ICD-10-PCS | Performed by: STUDENT IN AN ORGANIZED HEALTH CARE EDUCATION/TRAINING PROGRAM

## 2022-04-29 PROCEDURE — 258N000003 HC RX IP 258 OP 636: Performed by: STUDENT IN AN ORGANIZED HEALTH CARE EDUCATION/TRAINING PROGRAM

## 2022-04-29 PROCEDURE — 0LRV07Z REPLACEMENT OF RIGHT FOOT TENDON WITH AUTOLOGOUS TISSUE SUBSTITUTE, OPEN APPROACH: ICD-10-PCS | Performed by: STUDENT IN AN ORGANIZED HEALTH CARE EDUCATION/TRAINING PROGRAM

## 2022-04-29 PROCEDURE — 0HRMX73 REPLACEMENT OF RIGHT FOOT SKIN WITH AUTOLOGOUS TISSUE SUBSTITUTE, FULL THICKNESS, EXTERNAL APPROACH: ICD-10-PCS | Performed by: STUDENT IN AN ORGANIZED HEALTH CARE EDUCATION/TRAINING PROGRAM

## 2022-04-29 PROCEDURE — 120N000002 HC R&B MED SURG/OB UMMC

## 2022-04-29 PROCEDURE — 85027 COMPLETE CBC AUTOMATED: CPT | Performed by: STUDENT IN AN ORGANIZED HEALTH CARE EDUCATION/TRAINING PROGRAM

## 2022-04-29 PROCEDURE — 0HBMXZZ EXCISION OF RIGHT FOOT SKIN, EXTERNAL APPROACH: ICD-10-PCS | Performed by: STUDENT IN AN ORGANIZED HEALTH CARE EDUCATION/TRAINING PROGRAM

## 2022-04-29 PROCEDURE — 250N000011 HC RX IP 250 OP 636: Performed by: STUDENT IN AN ORGANIZED HEALTH CARE EDUCATION/TRAINING PROGRAM

## 2022-04-29 PROCEDURE — 83735 ASSAY OF MAGNESIUM: CPT | Performed by: STUDENT IN AN ORGANIZED HEALTH CARE EDUCATION/TRAINING PROGRAM

## 2022-04-29 RX ADMIN — HYDROMORPHONE HYDROCHLORIDE 0.5 MG: 1 INJECTION, SOLUTION INTRAMUSCULAR; INTRAVENOUS; SUBCUTANEOUS at 10:35

## 2022-04-29 RX ADMIN — OXYCODONE HYDROCHLORIDE 10 MG: 10 TABLET ORAL at 06:36

## 2022-04-29 RX ADMIN — GABAPENTIN 100 MG: 100 CAPSULE ORAL at 07:45

## 2022-04-29 RX ADMIN — ENOXAPARIN SODIUM 40 MG: 40 INJECTION SUBCUTANEOUS at 07:45

## 2022-04-29 RX ADMIN — ACETAMINOPHEN 975 MG: 325 TABLET ORAL at 11:16

## 2022-04-29 RX ADMIN — HYDROMORPHONE HYDROCHLORIDE 0.5 MG: 1 INJECTION, SOLUTION INTRAMUSCULAR; INTRAVENOUS; SUBCUTANEOUS at 03:40

## 2022-04-29 RX ADMIN — GABAPENTIN 100 MG: 100 CAPSULE ORAL at 13:31

## 2022-04-29 RX ADMIN — HYDROXYZINE HYDROCHLORIDE 25 MG: 25 TABLET, FILM COATED ORAL at 12:33

## 2022-04-29 RX ADMIN — ACETAMINOPHEN 975 MG: 325 TABLET ORAL at 04:30

## 2022-04-29 RX ADMIN — CEFAZOLIN SODIUM 2 G: 2 INJECTION, SOLUTION INTRAVENOUS at 15:22

## 2022-04-29 RX ADMIN — HYDROMORPHONE HYDROCHLORIDE 0.5 MG: 1 INJECTION, SOLUTION INTRAMUSCULAR; INTRAVENOUS; SUBCUTANEOUS at 16:36

## 2022-04-29 RX ADMIN — OXYCODONE HYDROCHLORIDE 10 MG: 10 TABLET ORAL at 23:50

## 2022-04-29 RX ADMIN — ONDANSETRON 4 MG: 2 INJECTION INTRAMUSCULAR; INTRAVENOUS at 16:35

## 2022-04-29 RX ADMIN — POTASSIUM CHLORIDE, DEXTROSE MONOHYDRATE AND SODIUM CHLORIDE: 150; 5; 450 INJECTION, SOLUTION INTRAVENOUS at 06:07

## 2022-04-29 RX ADMIN — HYDROMORPHONE HYDROCHLORIDE 0.5 MG: 1 INJECTION, SOLUTION INTRAMUSCULAR; INTRAVENOUS; SUBCUTANEOUS at 12:19

## 2022-04-29 RX ADMIN — ACETAMINOPHEN 975 MG: 325 TABLET ORAL at 17:33

## 2022-04-29 RX ADMIN — HYDROMORPHONE HYDROCHLORIDE 0.5 MG: 1 INJECTION, SOLUTION INTRAMUSCULAR; INTRAVENOUS; SUBCUTANEOUS at 07:56

## 2022-04-29 RX ADMIN — HYDROMORPHONE HYDROCHLORIDE 0.5 MG: 1 INJECTION, SOLUTION INTRAMUSCULAR; INTRAVENOUS; SUBCUTANEOUS at 20:49

## 2022-04-29 RX ADMIN — ACETAMINOPHEN 975 MG: 325 TABLET ORAL at 23:50

## 2022-04-29 RX ADMIN — HYDROMORPHONE HYDROCHLORIDE 0.5 MG: 1 INJECTION, SOLUTION INTRAMUSCULAR; INTRAVENOUS; SUBCUTANEOUS at 14:40

## 2022-04-29 RX ADMIN — HYDROMORPHONE HYDROCHLORIDE 0.5 MG: 1 INJECTION, SOLUTION INTRAMUSCULAR; INTRAVENOUS; SUBCUTANEOUS at 00:36

## 2022-04-29 RX ADMIN — GABAPENTIN 100 MG: 100 CAPSULE ORAL at 19:26

## 2022-04-29 RX ADMIN — OXYCODONE HYDROCHLORIDE 10 MG: 10 TABLET ORAL at 02:39

## 2022-04-29 RX ADMIN — SENNOSIDES AND DOCUSATE SODIUM 1 TABLET: 8.6; 5 TABLET ORAL at 07:45

## 2022-04-29 RX ADMIN — CEFAZOLIN SODIUM 2 G: 2 INJECTION, SOLUTION INTRAVENOUS at 23:50

## 2022-04-29 RX ADMIN — OXYCODONE HYDROCHLORIDE 10 MG: 10 TABLET ORAL at 15:23

## 2022-04-29 RX ADMIN — CEFAZOLIN SODIUM 2 G: 2 INJECTION, SOLUTION INTRAVENOUS at 00:33

## 2022-04-29 RX ADMIN — ASPIRIN 81 MG CHEWABLE TABLET 81 MG: 81 TABLET CHEWABLE at 10:36

## 2022-04-29 RX ADMIN — CEFAZOLIN SODIUM 2 G: 2 INJECTION, SOLUTION INTRAVENOUS at 07:43

## 2022-04-29 RX ADMIN — HYDROMORPHONE HYDROCHLORIDE 0.5 MG: 1 INJECTION, SOLUTION INTRAMUSCULAR; INTRAVENOUS; SUBCUTANEOUS at 05:59

## 2022-04-29 RX ADMIN — HYDROMORPHONE HYDROCHLORIDE 0.5 MG: 1 INJECTION, SOLUTION INTRAMUSCULAR; INTRAVENOUS; SUBCUTANEOUS at 18:36

## 2022-04-29 RX ADMIN — OXYCODONE HYDROCHLORIDE 10 MG: 10 TABLET ORAL at 11:16

## 2022-04-29 RX ADMIN — OXYCODONE HYDROCHLORIDE 10 MG: 10 TABLET ORAL at 19:26

## 2022-04-29 RX ADMIN — POTASSIUM CHLORIDE, DEXTROSE MONOHYDRATE AND SODIUM CHLORIDE: 150; 5; 450 INJECTION, SOLUTION INTRAVENOUS at 22:30

## 2022-04-29 RX ADMIN — HYDROMORPHONE HYDROCHLORIDE 0.5 MG: 1 INJECTION, SOLUTION INTRAMUSCULAR; INTRAVENOUS; SUBCUTANEOUS at 22:48

## 2022-04-29 ASSESSMENT — ACTIVITIES OF DAILY LIVING (ADL)
ADLS_ACUITY_SCORE: 9
ADLS_ACUITY_SCORE: 13
ADLS_ACUITY_SCORE: 13
ADLS_ACUITY_SCORE: 9
ADLS_ACUITY_SCORE: 7
ADLS_ACUITY_SCORE: 9
ADLS_ACUITY_SCORE: 13
ADLS_ACUITY_SCORE: 9
ADLS_ACUITY_SCORE: 13
ADLS_ACUITY_SCORE: 13
ADLS_ACUITY_SCORE: 9
ADLS_ACUITY_SCORE: 13
ADLS_ACUITY_SCORE: 13
ADLS_ACUITY_SCORE: 7
ADLS_ACUITY_SCORE: 9
ADLS_ACUITY_SCORE: 13
ADLS_ACUITY_SCORE: 7
ADLS_ACUITY_SCORE: 13

## 2022-04-29 NOTE — PROGRESS NOTES
PLASTIC SURGERY OPERATIVE REPORT     Date of Surgery: 4/28/2022  Surgical Service: Plastic Surgery     Preoperative Diagnosis:   1. Right great toe benign but locally aggressive recurrent symptomatic lesion consistent with dermatofibroma  2. Right dorsal foot wound with exposed metatarsophalangeal joint and loss of great toe extensor tendon status post wide local resection     Postoperative Diagnosis: Same as preoperative diagnoses     Procedures Performed:  1. Right dorsal foot irrigation and debridement using a sharp blade with additional deep and inferior skin margin excisions  2. Right extensor hallucis longus tendon reconstruction with use of right second toe extensor tendon graft  3. Right dorsal foot wound reconstruction with free skin flap taken from the left radial forearm, with additional complexity attributable to additional venous anastomoses as well as neurotization  4.  Primary neurorrhaphy between cut and of deep peroneal nerve and lateral antebrachial cutaneous nerve in the left forearm flap  5.  Placement of Integra bilaminar neodermis onto the left radial forearm flap donor site (4.5 cm x 3.5 cm)  6.  Placement of posterior splint onto the right leg  7.  Placement of volar wrist splint onto the left forearm     Attending:  DARIUS Adames  Assistant: CYNDEE Valdovinos     Complications: None apparent  Specimens: None  Implants: Integra bilaminar neodermis  Estimated blood loss: < 5 mL  Tourniquet time: Right lower extremity tourniquet time of 53 minutes.  Left arm tourniquet time of 75 minutes, followed by 15-minute break, and then an additional 51-minutes.  Wound classification: Clean contaminated  Anesthesia: General     Indications for Procedure: 31-year-old female with history of recurrent right dorsal foot benign locally aggressive lesion consistent with dermatofibroma now status post wide local resection with loss of great toe extensor and exposed MTP joint capsule, presenting for reconstruction.   After discussing all options for treatment, including amputation, MTP fusion with Integra versus negative pressure wound therapy, or great toe extensor reconstruction with free tissue coverage, patient consents to the latter.  Patient has a normal left upper extremity Dagoberto's test, which was confirmed with pencil Doppler, including adequate perfusion of the thumb.  Discussed the risks of surgery, including but not limited to: infection, bleeding, pain, poor scarring, injury to nerves and tendons, free flap loss, need for return to OR, need for additional surgery, wound healing issues, need for blood transfusion, need for leeching, DVT/PE, death. Despite these risks, patient consents to and would like to proceed with surgery.     Intraoperative findings: The right dorsal foot wound was clean with healthy granulation, but exposed joint capsule of the right first MTP and 5 cm segmental loss of extensor hallux is longus tendon with associated great toe flexion deformity.  Following debridement and excision of both additional deep and inferior skin margins, wound was irrigated with 3 L of saline and found to be clean.  Cut end of the peroneal nerve was identified and preserved.  Dorsal first metatarsal artery was identified and ligated.  Second toe extensor tendon was identified and harvested for great toe extensor tendon reconstruction.  This was done with exposure of the distal stump of the tendon, which was a least 2 cm in length, as well as freshening up of the proximal stump of the tendon.  Tendon reconstruction was done with multiple figure-of-eight sutures using 0 FiberWire distally, and 3 Pulvertaft weaves proximally reinforced with 0 FiberWire suture.  The right dorsalis pedis artery was spastic but adequate caliber of approximately 1.5 mm.  Vasospasticity improved with 10 minutes of papaverine and ultimately with strong spurt.  Left radial forearm free flap was harvested with no issues, including branches of  the cephalic and basilic veins incorporated into the superficial draining system of the flap.  There was a branch of the radial sensory nerve that was divided as it traveled through or near the pedicle.  There was an additional lateral antebrachial cutaneous nerve which traveled into the skin paddle which was divided proximally to be used for neurotization of the flap for sensation.  Transfer of the left forearm flap to the right foot proceeded with no issues.  Single artery anastomosis was done and and from the radial artery to the dorsalis pedis, with caliber of approximately 1 to 1.5 mm, done in conventional fashion with interrupted 8-0 nylon suture, with no revisions.  Three venous anastomoses were done including a 1.0 mm venae comitantes, a 2.0 mm branch of the cephalic vein, and a 1.5 mm branch of the basilic vein.  Total ischemia time for the flap was 1 hour and 16 minutes.  There was no bleeding underneath the flap during the inset.  The donor site was closed primarily except for a distal area which Integra was placed.  The left hand was well-perfused at the end of the case.  The right lower extremity free flap was also well perfused at the end of the case with strong arterial and venous Doppler signals, including Vioptix signals in the low 80s.     Description of Procedure: Patient was seen in the preoperative holding area.  Consent was verified.  The right foot, left forearm and the right thigh were marked.  All additional questions were answered.  Discussed the risks of surgery, including but not limited to: infection, bleeding, pain, poor scarring, injury to nerves and tendons, free flap loss, need for return to OR, need for additional surgery, wound healing issues, need for blood transfusion, need for leeching, DVT/PE, death. Despite these risks, patient consents to and would like to proceed with surgery. Patient was then transferred to the operating room and placed supine on the operating table.  All  pressure points were padded.  Sequential compression devices were placed on the left lower extremity and verified to be operational.  IV antibiotic prophylaxis was given.  Preinduction timeout was performed.    General anesthesia was induced.  Lawanda hugger was placed.  A tourniquet was placed on the left arm.  The left hand, wrist and forearm, the right circumferential foot, ankle and leg as well as the right thigh were prepped and draped in usual sterile fashion. A sterile tourniquet was placed on the right calf.  At the start of the case, additional 2 mm kelley-circumferential inferior skin margin was excised using a #15 blade.  This was sent as a permanent specimen.  Also, the deep margin was additionally excised and sent as a separate specimen for permanent pathology.  Part of the deep additional margin was the dorsal MTP joint capsule.  Of note, after discussion with Dr. Seals and two pathologists preoperatively, there was no indication to send frozen sections.  Additionally, given the benign diagnosis, indolent course and good prognosis, that pathologist recommendation was to be conservative with additional resection.  After additional inferior skin and deep margin excision, the wound base was irrigated copiously with 3 L of sterile saline and gentle gauze debridement was also done.  After irrigation, the foot was redraped with sterile blue towels.  Since the metatarsal phalangeal joint was exposed after the deep margin debridement, figure-of-eight 3-0 Vicryl suture was used to close the capsule.  The right calf tourniquet was inflated to 250 mmHg. Next, a longitudinally oriented dorsal skin incision was made just proximal to the great toe eponychial fold to expose the distal extensor hallucis longus tendon stump.  The proximal tendon stump was secured by a Prolene stitch, which was removed, and the tendon was mobilized, and trimmed at the edge to freshen it.  Next, a curvilinear dorsal foot skin incision was  made.  Prior to skin incision, the foot was made dependent, superficial dorsal foot veins were marked out with the plan for isolation and use for additional flap drainage.  Once dorsal foot incision was made, care was taken to identify several additional dorsal foot veins which could be used for flap drainage.  These were isolated and mobilized using gentle tenotomy dissection and small branches were ligated with micro clips as needed.  These veins were Moistened with papaverine soaked cottonoids.  Next, the dorsalis pedis artery and associated venae comitantes were identified and isolated, found to be of adequate caliber, and a vessel loop was placed.  Next, the second toe long extensor tendon was identified.  The great toe extensor segmental defect was measured to be approximately 5 cm.  Consequently, the second toe long extensor tendon was harvested for a length of at least 9 cm, to allow for proximal Pulvertaft weaving.  Once that tendon graft was harvested, it was secured distally with figure-of-eight 0 FiberWire suture times two.  The distal tenorrhaphy was solid.  Next, the great toe was hyperextended and the proximal tenorrhaphy was done with three Pulvertaft weaves, secured with figure-of-eight 0 FiberWire suture.  The great toe extensor tendon reconstruction felt very solid at its completion.  At this point, the right dorsal foot wound was covered with moistened Ray-Krishna in the right calf tourniquet was taken down.    Attention was now given the left forearm flap harvest.  At the start of flap harvest, as was done at the beginning of the case, a Doppler Dagoberto's test was done.  It was confirmed that the left hand was perfused in an ulnar dominant fashion and the Dagoberto's test was normal.  Of note, the left thumb was well perfused with complete occlusion of the radial artery.  First, the lateral intermuscular septum was marked with a line extending from the antecubital fossa to the scaphoid tubercle.   Additionally, the radial styloid and the FCR tendon were marked.  Doppler was used to also bhavani the radial artery in its course.  A stencil that was taken of the resulting right dorsal foot wound, with the dimensions of approximately 4.5 x 3.5 cm, was used to stencil the proposed skin flap harvest at the left forearm.  Since and lower extremity reconstruction, with the swelling, and the delicate microvascular surgery at the recipient site, and additional extension to the skin paddle was planned proximally.  Furthermore, the left hand was placed in a dependent position and superficial veins of the basilic and cephalic systems were also marked.  At the start of flap harvest, the ulnar-sided incision was made through the skin with care taken to preserve superficial veins of adequate caliber that were extending into the flap territory.  These were dissected carefully with tenotomy scissors and smaller branches were prospectively ligated with micro clips.  The same thing was done following skin incision along the radial aspect of the proposed skin paddle.  Of note, a large caliber branch of the basilic vein along with the cephalic vein were ligated proximally and preserved distally.  They were found to be entering the proposed skin paddle and likely contributing to the drainage of the flap.  Once these additional veins were harvested, papaverine soaked cottonoids were placed over them and they were kept moist.  Next, the flap was harvested in the suprafascial plane until reaching the FCR on the ulnar side and the brachioradialis tendon on the radial side.  Once at the FCR and BR tendons, sharp incision was made through the fascia and the dissection proceeded subfascial.  Next, the volar branch of the radial artery was identified distally, ligated and microclipped.  The distal continuation of the radial artery and its venae comitantes was also identified, ligated and microclipped.  A flap harvest then proceeded from  distal to proximal.  Any muscular branches coming from the pedicle were prospectively ligated and micro clipped.  Of note, care was taken not to shear the skin paddle from the pedicle at the level of the septum.  There was a branch of the superficial sensory radial nerve that had to be divided as it entered the flap and wrapped around the pedicle.  During the proximal aspect of the harvest, the lateral antebrachial cutaneous nerve was identified entering the flap in the skin territory of the skin paddle.  This nerve was isolated carefully, divided proximally and tagged for subsequent recipient site neurorrhaphy.  The pedicle was harvested almost to the antecubital fossa, at a level where the size match with the recipient site was appropriate and the length of the pedicle was adequate.  Once the flap was fully harvested, the tourniquet on the arm was taken down.  Additional hemostasis was done with micro clips as needed as well as bipolar cautery.  At this point, the flap was stapled back into place, found to be very adequately perfused, and attention was then placed back onto the right foot for final preparation of recipient site vessels.    Under microscope, the dorsalis pedis artery and its venae comitantes were prepared.  It was found that the dorsalis pedis artery was approximately 1 to 1.5 mm, but it appeared vasospastic.  The associated venae comitantes were very small, with the largest only 0.8-0.9 millimeters.  Once the dorsalis pedis artery and vein were circumferentially prepared, they were distally ligated with micro clips, clamped proximally with micro clamps, and flushed with heparinized saline.  When the micro clamp on the artery was removed, there was very little spurt initially.  This was corrected by placing a papaverine soaked cottonoid onto the artery and vein, and allowing the vasospasm to resolve over the next 5 to 10 minutes.  Prior to flap transfer, also additional dorsal foot veins were  prepared in the same fashion by distally clipping, gently proximally dissecting, and ligating as well as clipping any additional branches.  Self-retaining retractors were placed in preparation for flap transfer.  The left dorsal foot wound was again covered with a moistened Ray-Krishna.    At this stage, attention was given the left forearm flap harvest.  The flap pedicle was ligated with multiple medium sized clips.  Once done, the flap was transferred to the right foot.  Care was taken to gently dissect the flap pedicle artery from the largest of the venae comitantes.  Under microscope, the arterial anastomosis was done in end-to-end fashion using the dorsalis pedis artery.  The arterial anastomosis was done with simple interrupted 8-0 nylon suture.  Next, the end-to-end venous anastomosis was done with a 1.0 mm  for the largest of the venae comitantes.  Next, one of the large dorsal foot veins on the tibial aspect of the dorsal foot was anastomosed to the branch of the cephalic vein on the flap.  This was done with a 2.0 mm .  At this point, the micro clamps were removed and the ischemia time was done.  There was immediate reflow with excellent skin Doppler signal.  At this time, a third venous anastomosis was done on the fibular side of the dorsal foot using a branch of the basilic vein, and a 1.5 mm .  Of note, there were no micro revisions needed, the artery flow throughout.  The veins were patent.  At this point, the end to end neurorrhaphy was done after freshly trimming the edges of the cut end of the deep peroneal nerve in the lateral antebrachial cutaneous nerve.  The neurorrhaphy was done using simple interrupted 8-0 nylon suture.  The pedicle layer was confirmed to be gradual with no kinks.  All the veins that were anastomosed were also laid down to prevent kinking.  The flap was then inset in a way that would not cause compression of any of the veins or the pedicle.  Care was taken to  identify any branches that were bleeding and and perform additional ligation with micro clips under the microscope.  There were two small branches on the flap that needed additional micro clipping prior to full inset.  The flap skin paddle inset was done with 3-0 Vicryl suture in the deep dermis and 4-0 chromic suture in the skin.  There is no compression on the pedicle.  There was excellent skin paddle turgor, color, stable 2-second capillary refill with no congestion, and excellent skin Doppler signals throughout the entirety of the case.  The left forearm was closed primarily proximally using 3-0 Vicryl suture in the deep dermis and 3-0 Monocryl suture in intracuticular skin closure.  The distal remaining wound was closed down with 3-0 Vicryl suture between the deep dermis and the underlying muscle.  The FCR tendon was covered by pulling over the flexor digitorum superficialis muscle belly using 3-0 Vicryl suture.  There was a three-dimensional contour concavity, that if skin grafted would appear aesthetically displeasing, so the decision was made to place Integra over the donor site.  This was secured in place using 4-0 chromic running suture with care taken to ensure the collagen layer of the Integra was placed down.  The Integra was bolstered in place using a Adaptic and sponge bolster dressing.  This was secured with staples.  The forearm incision was dressed with Xeroform and bacitracin.  The left forearm was gently wrapped with cast padding and a volar forearm-based wrist neutral splint was placed with the fingers free.  Of note, the left hand was well-perfused and warm.  The right foot was dressed with Xeroform and bacitracin along the flap inset closure.  A bioptic's probe was placed on the distal aspect of the flap skin paddle, and found to be with greater than 90% signal quality and an oxygen saturation of greater than 80%.  This was stable throughout the case.  The left foot, ankle and leg were wrapped  with cast padding and a posterior blocking splint was placed, with the plaster extending distal to the great toe to support the extensor tendon reconstruction.  At this point, the right foot was elevated on several pillows.  The patient was then woken up from general anesthesia, and transferred to a stretcher with no events.  Care was taken to ensure that the right foot was strictly elevated.  Nursing was instructed to ensure that this was done.  Patient was then transferred to the postanesthesia care unit with no events.     Postoperative plan:  Admitted to the floor for frequent flap monitoring including strict right foot elevation.  Bedrest for the first several days.  PT evaluation for possible assisted transfer into a wheelchair with strict right foot elevation.  Every hour flap checks including Vioptix.  Planned initiation of dangle protocol early next week with the assistance of Vioptix.      Thom Adames MD, PhD

## 2022-04-29 NOTE — PROVIDER NOTIFICATION
Paged Todd Johnson at 0046. BP 99/50, recheck 99/52. OVSS on 1 L O2 nasal cannula. Parameters to notify if SBP <100.     MD aware. Per MD, notify if SBP <80.

## 2022-04-29 NOTE — PROVIDER NOTIFICATION
Paged provider Aditya Pinto regarding martinez order. Order said to remove on POD #1 however, provider in room said to keep in martinez until pt can safely transfer to toilet with PT. Provider said if pt can't transfer or PT does not see pt today re-page this evening to have order updated.

## 2022-04-29 NOTE — PLAN OF CARE
AOx4. VSS on ra. Strict bedrest; providers placed orders for PT/OT. No Lawanda hugger needed per team this AM.  Providers want pt to get out of bed with assistance of PT/OT prior to nursing staff getting pt out. Pt leg elevated on leg wedge pillow; wedge pillow for arm ordered; waiting for wedge to arrive to unit. Arm elevated on pillows for now. +flatus. No BM. CLD; PRN Zofran given x1 for nausea. Martinez with AUO; cath cares completed x1 on shift. RLE flap q1hr flap check. RLE flap + doppler, pink, warm and soft. ViOptix WDL. LUE site ACE wrap; UTV. PRN Atarax given for itching. Pt continues to have moderate to significant pain; team aware. Pt taking PRN IV Dilaudid, PRN Oxycodone, Gabapentin and Tylenol. Providers continue to encouraged pt to elevate LUE and RLE for pain. PIV infusing with IVMF.  at bedside and supportive of cares. Team said to keep martinez in place, team said they will adjust order for this to reflect current order; awaiting on adjustment.

## 2022-04-29 NOTE — PROGRESS NOTES
"Plastic Surgery Progress Note    S: No acute events overnight. Vioptix numbers in the 80s, pain 7 out of 10 but manageable.     /47 (BP Location: Right arm)   Pulse 70   Temp 98.9  F (37.2  C) (Temporal)   Resp 14   Ht 1.727 m (5' 8\")   Wt 65.3 kg (143 lb 15.4 oz)   LMP 04/21/2022 (Exact Date)   SpO2 95%   BMI 21.89 kg/m    Gen: NAD,   Chest: Non-labored breathing  CV: RRR  Wound:   Left upper extremity is in an ACE wrap, digits are WWP with 2 sec cap refill, SILT in the m/r/u nerve distributions, patient able to flex and extend fingers without dramatic increase in pain  Right lower extremity, ACE and splint in place, flap is WWP with ~ 2 sec cap refill, strong audible doppler signals with triphasic arterial signal and venous augmentation    A/P  Reyna Martinez is a 31 year old  who is now s/p right great toe reconstruction with a left free radial forearm flap and right great toe extensor reconstruction with adjacent tendon graft, recovering well, Vioptix numbers are solid and the flap does not appear congested   - OK for out of bed to chair with Physical therapists only at this time  - CLD with plans to advance to regular diet if she does well, fluid can be discontinued at that time  - Shah will remain until patient is able to get up to the chair  - ASA 81 mg daily and Lovenox 40 mg  - Right lower extremity must be strictly elevated at all times for the next few days  - Continue current pain regimen    Dispo: ~ 1 week    Aditya Pinto MD  Plastic Surgery       "

## 2022-04-29 NOTE — OR NURSING
Plastics paged for additional pain mediction    Dr Roque contacted and additional pain med orders received

## 2022-04-29 NOTE — PROGRESS NOTES
POD0 of a R dorsal foot irrigation and debridement. Graft taken from L forearm. A&Ox4, VSS on 2 LPM NC. Pain is rated 7-8. Arrived on the floor around 2100. Doppler present. Vioptics 80%. Pain is controlled with IV dilaudid, oxycodone, gabapentin, and Tylenol. Shah in place with good output. LBM today. LSC, BS hypoactive. Flap checks QH. Bedrest until tomorrow.

## 2022-04-29 NOTE — PLAN OF CARE
[id number] RLE flap checks q1h. Flap pink, warm and soft, +doppler. Vioptix values stable in upper 70s-80s. ACE wrap to LUE C/D/I. RLE and LUE elevated on pillows. Denies numbness or tingling to BUE and BLE. No flatus or BM. Pt reporting significant LUE pain; pain somewhat managed with Tylenol, oxycodone, and IV Dilaudid. On IV antibiotics. Strict bedrest. Shah in place with adequate urine output. NPO, denies nausea. BP soft, MD notified. OVSS on 1 L O2 nasal cannula. Capno on.

## 2022-04-29 NOTE — PROGRESS NOTES
PRS    No issues. Slept comfortably. Complains of pain at donor site. No numbness, paresthesias or passive extension discomfort.    On exam, the R foot flap is viable, stable 2-2.5 s cap refill, soft, warm, strong venous and triphasic arterial pencil Doppler signals, stable Vioptix readings in low 80s with signal quality >90. Left hand slightly swollen, but well-perfused with normal sensation in all digits.     UOP 1350 since midnight  No drains    Hgb 9.9 (11.3)      A/P: 31F POD #1 s/p R great toe extensor, L free radial forearm flap recon, doing well so far    -Strict R foot elevation at all times. She will be with strict R foot elevation through the weekend. Patient understands the importance of this and is agreeable.   -Bedrest until evaluated by PT. If patient can transfer to wheelchair with R foot elevation at all times safely, then we will cancel order.   -Continue flap checks Q1H  -Continue Vioptix  -Start ASA 81 daily  -Start DVT chemoprophylaxis  -Start clear liquid diet  -Encouraged patient to elevate the L hand and to move fingers to help with swelling and to prevent stiffness. We will try to obtain a wedge foam pillow (Robetr) for elevation.   -IV Ancef or PO Keflex for next 3 days  -Shah for now, until patient can safely transfer to toilet seat with R foot elevated at all times  -Pain control, but NO Toradol  -Stop IVF  -Anticipate start of controlled dangle with use of Vioptix to help accelerate/optimize protocol early next week    Thom Adames MD, PhD

## 2022-04-29 NOTE — ANESTHESIA POSTPROCEDURE EVALUATION
Patient: Reyna Martinez    Procedure: Procedure(s):  Right dorsal foot irrigation and debridement  Right great toe extensor tendon reconstruction with use of tendon graft from the ipsilateral foot  Free flap taken from either groin or forearm, Possible open vein graft harvest from either foot or forearm possible placement of Integra       Anesthesia Type:  General    Note:  Disposition: ICU (ICU for flap checks only)            ICU Sign Out: Anesthesiologist/ICU physician sign out WAS performed   Postop Pain Control: Uneventful            Sign Out: Well controlled pain   PONV: No   Neuro/Psych: Uneventful            Sign Out: Acceptable/Baseline neuro status   Airway/Respiratory: Uneventful            Sign Out: Acceptable/Baseline resp. status   CV/Hemodynamics: Uneventful            Sign Out: Acceptable CV status; No obvious hypovolemia; No obvious fluid overload   Other NRE: NONE   DID A NON-ROUTINE EVENT OCCUR? No           Last vitals:  Vitals Value Taken Time   /60 04/28/22 1930   Temp 36.9  C (98.4  F) 04/28/22 1915   Pulse 70 04/28/22 1935   Resp 14 04/28/22 1915   SpO2 98 % 04/28/22 1935   Vitals shown include unvalidated device data.    Electronically Signed By: Sebastián Roque MD  April 28, 2022  7:37 PM

## 2022-04-30 ENCOUNTER — APPOINTMENT (OUTPATIENT)
Dept: PHYSICAL THERAPY | Facility: CLINIC | Age: 32
DRG: 465 | End: 2022-04-30
Attending: STUDENT IN AN ORGANIZED HEALTH CARE EDUCATION/TRAINING PROGRAM
Payer: COMMERCIAL

## 2022-04-30 PROCEDURE — 97162 PT EVAL MOD COMPLEX 30 MIN: CPT | Mod: GP

## 2022-04-30 PROCEDURE — 250N000013 HC RX MED GY IP 250 OP 250 PS 637: Performed by: STUDENT IN AN ORGANIZED HEALTH CARE EDUCATION/TRAINING PROGRAM

## 2022-04-30 PROCEDURE — 250N000011 HC RX IP 250 OP 636: Performed by: STUDENT IN AN ORGANIZED HEALTH CARE EDUCATION/TRAINING PROGRAM

## 2022-04-30 PROCEDURE — 120N000002 HC R&B MED SURG/OB UMMC

## 2022-04-30 PROCEDURE — 97530 THERAPEUTIC ACTIVITIES: CPT | Mod: GP

## 2022-04-30 RX ORDER — MELOXICAM 7.5 MG/1
7.5 TABLET ORAL DAILY
Status: DISCONTINUED | OUTPATIENT
Start: 2022-04-30 | End: 2022-05-06 | Stop reason: HOSPADM

## 2022-04-30 RX ORDER — GABAPENTIN 300 MG/1
300 CAPSULE ORAL 3 TIMES DAILY
Status: DISCONTINUED | OUTPATIENT
Start: 2022-04-30 | End: 2022-05-06 | Stop reason: HOSPADM

## 2022-04-30 RX ORDER — HYDROMORPHONE HCL IN WATER/PF 6 MG/30 ML
.2-.4 PATIENT CONTROLLED ANALGESIA SYRINGE INTRAVENOUS
Status: DISCONTINUED | OUTPATIENT
Start: 2022-04-30 | End: 2022-05-02

## 2022-04-30 RX ORDER — ACETAMINOPHEN 500 MG
1000 TABLET ORAL EVERY 6 HOURS
Status: DISCONTINUED | OUTPATIENT
Start: 2022-04-30 | End: 2022-05-06 | Stop reason: HOSPADM

## 2022-04-30 RX ORDER — OXYCODONE HYDROCHLORIDE 5 MG/1
5 TABLET ORAL EVERY 6 HOURS PRN
Status: DISCONTINUED | OUTPATIENT
Start: 2022-04-30 | End: 2022-05-06 | Stop reason: HOSPADM

## 2022-04-30 RX ORDER — DIAZEPAM 2 MG
2 TABLET ORAL 3 TIMES DAILY PRN
Status: DISCONTINUED | OUTPATIENT
Start: 2022-04-30 | End: 2022-05-06 | Stop reason: HOSPADM

## 2022-04-30 RX ORDER — RIZATRIPTAN BENZOATE 5 MG/1
5 TABLET ORAL ONCE
Status: COMPLETED | OUTPATIENT
Start: 2022-04-30 | End: 2022-05-02

## 2022-04-30 RX ORDER — OXYCODONE HYDROCHLORIDE 10 MG/1
10 TABLET ORAL EVERY 6 HOURS PRN
Status: DISCONTINUED | OUTPATIENT
Start: 2022-04-30 | End: 2022-05-06 | Stop reason: HOSPADM

## 2022-04-30 RX ADMIN — GABAPENTIN 100 MG: 100 CAPSULE ORAL at 07:54

## 2022-04-30 RX ADMIN — CEFAZOLIN SODIUM 2 G: 2 INJECTION, SOLUTION INTRAVENOUS at 15:54

## 2022-04-30 RX ADMIN — HYDROMORPHONE HYDROCHLORIDE 0.5 MG: 1 INJECTION, SOLUTION INTRAMUSCULAR; INTRAVENOUS; SUBCUTANEOUS at 09:07

## 2022-04-30 RX ADMIN — HYDROMORPHONE HYDROCHLORIDE 0.5 MG: 1 INJECTION, SOLUTION INTRAMUSCULAR; INTRAVENOUS; SUBCUTANEOUS at 00:45

## 2022-04-30 RX ADMIN — ACETAMINOPHEN 975 MG: 325 TABLET ORAL at 06:48

## 2022-04-30 RX ADMIN — OXYCODONE HYDROCHLORIDE 10 MG: 10 TABLET ORAL at 07:53

## 2022-04-30 RX ADMIN — HYDROXYZINE HYDROCHLORIDE 25 MG: 25 TABLET, FILM COATED ORAL at 03:47

## 2022-04-30 RX ADMIN — HYDROMORPHONE HYDROCHLORIDE 0.5 MG: 1 INJECTION, SOLUTION INTRAMUSCULAR; INTRAVENOUS; SUBCUTANEOUS at 11:05

## 2022-04-30 RX ADMIN — HYDROXYZINE HYDROCHLORIDE 25 MG: 25 TABLET, FILM COATED ORAL at 14:37

## 2022-04-30 RX ADMIN — ACETAMINOPHEN 975 MG: 325 TABLET ORAL at 12:30

## 2022-04-30 RX ADMIN — OXYCODONE HYDROCHLORIDE 10 MG: 10 TABLET ORAL at 12:30

## 2022-04-30 RX ADMIN — HYDROMORPHONE HYDROCHLORIDE 0.4 MG: 0.2 INJECTION, SOLUTION INTRAMUSCULAR; INTRAVENOUS; SUBCUTANEOUS at 21:41

## 2022-04-30 RX ADMIN — OXYCODONE HYDROCHLORIDE 10 MG: 10 TABLET ORAL at 03:47

## 2022-04-30 RX ADMIN — HYDROMORPHONE HYDROCHLORIDE 0.5 MG: 1 INJECTION, SOLUTION INTRAMUSCULAR; INTRAVENOUS; SUBCUTANEOUS at 15:54

## 2022-04-30 RX ADMIN — OXYCODONE HYDROCHLORIDE 10 MG: 10 TABLET ORAL at 19:35

## 2022-04-30 RX ADMIN — HYDROMORPHONE HYDROCHLORIDE 0.5 MG: 1 INJECTION, SOLUTION INTRAMUSCULAR; INTRAVENOUS; SUBCUTANEOUS at 06:48

## 2022-04-30 RX ADMIN — HYDROMORPHONE HYDROCHLORIDE 0.5 MG: 1 INJECTION, SOLUTION INTRAMUSCULAR; INTRAVENOUS; SUBCUTANEOUS at 13:38

## 2022-04-30 RX ADMIN — ONDANSETRON 4 MG: 2 INJECTION INTRAMUSCULAR; INTRAVENOUS at 06:56

## 2022-04-30 RX ADMIN — GABAPENTIN 100 MG: 100 CAPSULE ORAL at 13:38

## 2022-04-30 RX ADMIN — ENOXAPARIN SODIUM 40 MG: 40 INJECTION SUBCUTANEOUS at 07:55

## 2022-04-30 RX ADMIN — HYDROXYZINE HYDROCHLORIDE 25 MG: 25 TABLET, FILM COATED ORAL at 21:42

## 2022-04-30 RX ADMIN — GABAPENTIN 300 MG: 300 CAPSULE ORAL at 21:41

## 2022-04-30 RX ADMIN — ASPIRIN 81 MG CHEWABLE TABLET 81 MG: 81 TABLET CHEWABLE at 07:55

## 2022-04-30 RX ADMIN — HYDROMORPHONE HYDROCHLORIDE 0.5 MG: 1 INJECTION, SOLUTION INTRAMUSCULAR; INTRAVENOUS; SUBCUTANEOUS at 02:52

## 2022-04-30 RX ADMIN — MELOXICAM 7.5 MG: 7.5 TABLET ORAL at 21:41

## 2022-04-30 RX ADMIN — ACETAMINOPHEN 1000 MG: 500 TABLET ORAL at 19:35

## 2022-04-30 RX ADMIN — HYDROMORPHONE HYDROCHLORIDE 0.5 MG: 1 INJECTION, SOLUTION INTRAMUSCULAR; INTRAVENOUS; SUBCUTANEOUS at 04:49

## 2022-04-30 RX ADMIN — CEFAZOLIN SODIUM 2 G: 2 INJECTION, SOLUTION INTRAVENOUS at 07:56

## 2022-04-30 RX ADMIN — RIZATRIPTAN BENZOATE 5 MG: 5 TABLET ORAL at 11:05

## 2022-04-30 RX ADMIN — SENNOSIDES AND DOCUSATE SODIUM 1 TABLET: 8.6; 5 TABLET ORAL at 21:41

## 2022-04-30 ASSESSMENT — ACTIVITIES OF DAILY LIVING (ADL)
ADLS_ACUITY_SCORE: 13

## 2022-04-30 NOTE — PLAN OF CARE
VSS. Shah in place with adequate output, passing gas, no BM. Tolerating clear liquid diet. Pain somewhat managed with prn oxycodone q4h, prn iv dilaudid q2h, prn tylenol q6h. Prn atarax given x1 for itching. R foot flap checks q1h, with positive dopplers, pink, soft, good perfusion, good vioptix readings. R Leg elevated on wedge pillow. R Leg covered in dressing with ACE bandage, c/d/I. L forearm donor site covered in dressing and ACE bandage c/d/i, elevated on pillows. PIVx3: R PIV infusing IVMF, R PIV-SL, L foot PIV -SL. Bedrest, providers want PT/OT to get pt out of bed the first time, PT/OT ordered, will come today. Continue plan of care.

## 2022-04-30 NOTE — PROGRESS NOTES
"Plastic Surgery Progress Note    S: No acute events overnight. Continues to complain of left arm and right foot pain but it is manageable and she has been able to get some sleep.    /55 (BP Location: Right arm)   Pulse 81   Temp 99.6  F (37.6  C) (Temporal)   Resp 18   Ht 1.727 m (5' 8\")   Wt 65.3 kg (143 lb 15.4 oz)   LMP 04/21/2022 (Exact Date)   SpO2 97%   BMI 21.89 kg/m    Gen: NAD,   Chest: Non-labored breathing  CV: RRR  Wound:   Left upper extremity is in an ACE wrap, digits are WWP with 2 sec cap refill, SILT in the m/r/u nerve distributions, patient able to flex and extend fingers without dramatic increase in pain  Right lower extremity, ACE and splint in place, flap is WWP with ~ 2 sec cap refill, strong audible doppler signals with triphasic arterial signal and venous augmentation    A/P  Reyna Martinez is a 31 year old  who is now s/p right great toe reconstruction with a left free radial forearm flap and right great toe extensor reconstruction with adjacent tendon graft on 4/28, recovering well, Vioptix numbers are solid and the flap does not appear congested   - OK for out of bed to chair with Physical therapists only at this time  - Regular diet and discharge fluids  - Shah will remain until patient is able to get up to the chair  - ASA 81 mg daily and Lovenox 40 mg  - Right lower extremity must be strictly elevated at all times for the next few days  - Continue current pain regimen    Dispo: ~ 1 week    Aditya Pinto MD  Plastic Surgery       "

## 2022-04-30 NOTE — PROGRESS NOTES
04/30/22 1508   Quick Adds   Type of Visit Initial PT Evaluation   Living Environment   People in Home spouse   Current Living Arrangements house   Home Accessibility stairs to enter home   Number of Stairs, Main Entrance 3   Transportation Anticipated family or friend will provide;car, drives self   Living Environment Comments Pt lives in house with supportive , 3 LINSEY, all needs on main level.   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   General Information   Onset of Illness/Injury or Date of Surgery 04/28/22   Referring Physician Aditya Pinto MD   Patient/Family Therapy Goals Statement (PT) return home, good mobility plan in place for spouse assisting with transfers   Pertinent History of Current Problem (include personal factors and/or comorbidities that impact the POC) Per chart: Reyna Martinez is a 31 year old  who is now s/p right great toe reconstruction with a left free radial forearm flap and right great toe extensor reconstruction with adjacent tendon graft on 4/28, recovering well   Existing Precautions/Restrictions fall;other (see comments)  (strict RLE elevated to parallel to floor or higher)   General Observations Pt cleared for OOB with rehab only until mobility plan established   Cognition   Affect/Mental Status (Cognition) WFL;flat/blunted affect   Orientation Status (Cognition) oriented x 4   Pain Assessment   Patient Currently in Pain No  (significant pain in AM, controlled in PM)   Integumentary/Edema   Integumentary/Edema Comments surgical sites at R foot and L forearm   Posture    Posture Forward head position;Protracted shoulders   Range of Motion (ROM)   Range of Motion ROM deficits secondary to surgical procedure   Strength (Manual Muscle Testing)   Strength Comments  grossly >3/5, limited by post op precautions   Bed Mobility   Comment, (Bed Mobility) max A for moving RLE support, min A for pt   Transfers   Comment, (Transfers) mod A STS   Gait/Stairs (Locomotion)   Comment, (Gait/Stairs) deferred due to post op precautions   Balance   Balance no deficits were identified   Balance Comments pt reporting feeling light headed following prolonged sitting   Clinical Impression   Criteria for Skilled Therapeutic Intervention Yes, treatment indicated   PT Diagnosis (PT) impaired functional mobility   Influenced by the following impairments post op precautions, impaired activity tolerance, NWB/elevated RLE status   Functional limitations due to impairments bed mobility, transfers, gait, stairs   Clinical Presentation (PT Evaluation Complexity) Evolving/Changing   Clinical Presentation Rationale >3 body structure and functional impairments   Clinical Decision Making (Complexity) moderate complexity   Planned Therapy Interventions (PT) balance training;bed mobility training;gait training;home exercise program;neuromuscular re-education;stair training;strengthening;transfer training   Risk & Benefits of therapy have been explained evaluation/treatment results reviewed;care plan/treatment goals reviewed;risks/benefits reviewed   PT Discharge Planning   PT Discharge Recommendation (DC Rec) home with assist   PT Rationale for DC Rec Anticipating pt will be appropriate to dc home with assist from . Will benefit from OP PT once appropriate to start progressing activity   PT Brief overview of current status Ax2 due to elevating needs of RLE   Plan of Care Review   Plan of Care Reviewed With patient;spouse   Total Evaluation Time   Total Evaluation Time (Minutes) 5   Physical Therapy Goals   PT Frequency Daily   PT Predicted Duration/Target Date for Goal Attainment 05/11/22   PT Goals Bed Mobility;Transfers;Stairs   PT: Bed Mobility Modified independent;Supine to/from sit;Within  precautions   PT: Transfers Minimal assist;Bed to/from chair;Within precautions   PT: Stairs Minimal assist;3 stairs;Within precautions

## 2022-04-30 NOTE — PROGRESS NOTES
"PRS    No issues. Pain improved. Tired. PT came and she got up into a chair. Has not transferred into the bathroom yet.    BP 95/51 (BP Location: Right arm)   Pulse 86   Temp 99.5  F (37.5  C) (Temporal)   Resp 16   Ht 1.727 m (5' 8\")   Wt 65.3 kg (143 lb 15.4 oz)   LMP 04/21/2022 (Exact Date)   SpO2 96%   BMI 21.89 kg/m    Nonlabored breathing  Not distressed  R foot flap is viable, stable 2-2.5 s cap refill, soft, warm, some expectant swelling, strong several venous and triphasic arterial pencil Doppler signals, stable Vioptix readings in low 80s with signal quality >90. Left hand slightly swollen, improved, but well-perfused with normal sensation in all digits    No new labs  UOP 3100 since midnight    A/P: 31F POD #2 s/p R foot wound debridement, R great toe extensor, L free radial forearm flap recon, doing well    -Strict R foot elevation at all times. She will be with strict R foot elevation through the weekend. Patient understands the importance of this and is agreeable.   -Bedrest until evaluated by PT. If patient can transfer to wheelchair with R foot elevation at all times safely, then we will cancel order.   -Continue flap checks, but OK to space out to Q4H. This will allow her to sleep.  -Continue Vioptix  -Continue ASA 81 daily  -Continue DVT chemoprophylaxis  -Regular diet, all IVF stopped  -Encouraged patient to elevate the L hand and to move fingers to help with swelling and to prevent stiffness. We will try to obtain a wedge foam pillow (Robert) for elevation.   -IV Ancef or PO Keflex for next few days  -Shah for now, until patient can safely transfer to toilet seat with R foot elevated at all times  -Pain control, but NO Toradol  -Anticipate start of controlled dangle with use of Vioptix to help accelerate/optimize protocol early next week     Thom Adames MD, PhD  "

## 2022-04-30 NOTE — PLAN OF CARE
AOx4. VSS on ra. Bedrest; only to get up with therapy per orders. Strict right leg elevation on leg wedge pillow. Left arm elevated on pillows. +flatus. No BM. Regular diet; poor appetite. Martinez with AUO. RLE flap check changed to q4hr per MD team. RLE flap + doppler, pink, warm and soft. ViOptix WDL. LUE site ACE wrap; UTV. PRN Maxalt given for migraine. PRN Atarax given for itching. Pt continues to have moderate to significant pain in LUE and RLE; team aware. Pt taking PRN IV Dilaudid, PRN Oxycodone, Gabapentin and Tylenol. Provider continues to encourage pt to elevate LUE and RLE for pain. PIV tko in between antibiotic.  at bedside and supportive of cares. Spoke to team in person this morning and they said to keep martinez in place, team said they will adjust order for this to reflect current order; awaiting on adjustment. PT worked with pt this afternoon. Pt feeling better this afternoon than this AM.     Addendum: spoke to provider in person regarding pt martinez; MD said to keep martinez in place and that they would adjust order to reflect this. Awaiting adjustment. Pain medications adjusted per MD team.

## 2022-04-30 NOTE — PROGRESS NOTES
Pt is calm & alert. VSS. Passed gas but no BM yet. Scored pain 7 out of 10. Appears exhausted later in the day due to disruption in her sleep (flap monitoring Q2H). Performed hygiene care with assistance. Pain is managed with dilaudid & Oxycodone. She said she doesn't feel like herself today and complained of migraine. Shah catheter in placed.

## 2022-05-01 ENCOUNTER — APPOINTMENT (OUTPATIENT)
Dept: PHYSICAL THERAPY | Facility: CLINIC | Age: 32
DRG: 465 | End: 2022-05-01
Attending: STUDENT IN AN ORGANIZED HEALTH CARE EDUCATION/TRAINING PROGRAM
Payer: COMMERCIAL

## 2022-05-01 ENCOUNTER — APPOINTMENT (OUTPATIENT)
Dept: OCCUPATIONAL THERAPY | Facility: CLINIC | Age: 32
DRG: 465 | End: 2022-05-01
Attending: STUDENT IN AN ORGANIZED HEALTH CARE EDUCATION/TRAINING PROGRAM
Payer: COMMERCIAL

## 2022-05-01 LAB
HOLD SPECIMEN: NORMAL
PLATELET # BLD AUTO: 250 10E3/UL (ref 150–450)

## 2022-05-01 PROCEDURE — 250N000011 HC RX IP 250 OP 636: Performed by: STUDENT IN AN ORGANIZED HEALTH CARE EDUCATION/TRAINING PROGRAM

## 2022-05-01 PROCEDURE — 36415 COLL VENOUS BLD VENIPUNCTURE: CPT | Performed by: STUDENT IN AN ORGANIZED HEALTH CARE EDUCATION/TRAINING PROGRAM

## 2022-05-01 PROCEDURE — 120N000002 HC R&B MED SURG/OB UMMC

## 2022-05-01 PROCEDURE — 97530 THERAPEUTIC ACTIVITIES: CPT | Mod: GP

## 2022-05-01 PROCEDURE — 85049 AUTOMATED PLATELET COUNT: CPT | Performed by: STUDENT IN AN ORGANIZED HEALTH CARE EDUCATION/TRAINING PROGRAM

## 2022-05-01 PROCEDURE — 97165 OT EVAL LOW COMPLEX 30 MIN: CPT | Mod: GO

## 2022-05-01 PROCEDURE — 97535 SELF CARE MNGMENT TRAINING: CPT | Mod: GO

## 2022-05-01 PROCEDURE — 250N000013 HC RX MED GY IP 250 OP 250 PS 637: Performed by: STUDENT IN AN ORGANIZED HEALTH CARE EDUCATION/TRAINING PROGRAM

## 2022-05-01 RX ADMIN — HYDROMORPHONE HYDROCHLORIDE 0.4 MG: 0.2 INJECTION, SOLUTION INTRAMUSCULAR; INTRAVENOUS; SUBCUTANEOUS at 19:30

## 2022-05-01 RX ADMIN — SENNOSIDES AND DOCUSATE SODIUM 1 TABLET: 8.6; 5 TABLET ORAL at 21:35

## 2022-05-01 RX ADMIN — HYDROXYZINE HYDROCHLORIDE 25 MG: 25 TABLET, FILM COATED ORAL at 12:15

## 2022-05-01 RX ADMIN — HYDROMORPHONE HYDROCHLORIDE 0.4 MG: 0.2 INJECTION, SOLUTION INTRAMUSCULAR; INTRAVENOUS; SUBCUTANEOUS at 09:09

## 2022-05-01 RX ADMIN — SENNOSIDES AND DOCUSATE SODIUM 1 TABLET: 8.6; 5 TABLET ORAL at 08:11

## 2022-05-01 RX ADMIN — ASPIRIN 81 MG CHEWABLE TABLET 81 MG: 81 TABLET CHEWABLE at 08:11

## 2022-05-01 RX ADMIN — ACETAMINOPHEN 1000 MG: 500 TABLET ORAL at 01:13

## 2022-05-01 RX ADMIN — OXYCODONE HYDROCHLORIDE 10 MG: 10 TABLET ORAL at 01:12

## 2022-05-01 RX ADMIN — HYDROXYZINE HYDROCHLORIDE 25 MG: 25 TABLET, FILM COATED ORAL at 05:54

## 2022-05-01 RX ADMIN — OXYCODONE HYDROCHLORIDE 10 MG: 10 TABLET ORAL at 07:26

## 2022-05-01 RX ADMIN — HYDROMORPHONE HYDROCHLORIDE 0.4 MG: 0.2 INJECTION, SOLUTION INTRAMUSCULAR; INTRAVENOUS; SUBCUTANEOUS at 05:54

## 2022-05-01 RX ADMIN — HYDROXYZINE HYDROCHLORIDE 25 MG: 25 TABLET, FILM COATED ORAL at 21:35

## 2022-05-01 RX ADMIN — ACETAMINOPHEN 1000 MG: 500 TABLET ORAL at 07:26

## 2022-05-01 RX ADMIN — HYDROMORPHONE HYDROCHLORIDE 0.4 MG: 0.2 INJECTION, SOLUTION INTRAMUSCULAR; INTRAVENOUS; SUBCUTANEOUS at 15:37

## 2022-05-01 RX ADMIN — CEFAZOLIN SODIUM 2 G: 2 INJECTION, SOLUTION INTRAVENOUS at 15:37

## 2022-05-01 RX ADMIN — HYDROMORPHONE HYDROCHLORIDE 0.2 MG: 0.2 INJECTION, SOLUTION INTRAMUSCULAR; INTRAVENOUS; SUBCUTANEOUS at 02:03

## 2022-05-01 RX ADMIN — CEFAZOLIN SODIUM 2 G: 2 INJECTION, SOLUTION INTRAVENOUS at 01:13

## 2022-05-01 RX ADMIN — OXYCODONE HYDROCHLORIDE 10 MG: 10 TABLET ORAL at 21:35

## 2022-05-01 RX ADMIN — GABAPENTIN 300 MG: 300 CAPSULE ORAL at 08:12

## 2022-05-01 RX ADMIN — CEFAZOLIN SODIUM 2 G: 2 INJECTION, SOLUTION INTRAVENOUS at 08:12

## 2022-05-01 RX ADMIN — ENOXAPARIN SODIUM 40 MG: 40 INJECTION SUBCUTANEOUS at 08:11

## 2022-05-01 RX ADMIN — MELOXICAM 7.5 MG: 7.5 TABLET ORAL at 21:36

## 2022-05-01 RX ADMIN — GABAPENTIN 300 MG: 300 CAPSULE ORAL at 13:07

## 2022-05-01 RX ADMIN — GABAPENTIN 300 MG: 300 CAPSULE ORAL at 21:36

## 2022-05-01 RX ADMIN — ACETAMINOPHEN 1000 MG: 500 TABLET ORAL at 12:15

## 2022-05-01 RX ADMIN — HYDROMORPHONE HYDROCHLORIDE 0.4 MG: 0.2 INJECTION, SOLUTION INTRAMUSCULAR; INTRAVENOUS; SUBCUTANEOUS at 12:15

## 2022-05-01 RX ADMIN — OXYCODONE HYDROCHLORIDE 10 MG: 10 TABLET ORAL at 13:07

## 2022-05-01 RX ADMIN — ACETAMINOPHEN 1000 MG: 500 TABLET ORAL at 19:30

## 2022-05-01 ASSESSMENT — ACTIVITIES OF DAILY LIVING (ADL)
ADLS_ACUITY_SCORE: 13
ADLS_ACUITY_SCORE: 13
ADLS_ACUITY_SCORE: 11
ADLS_ACUITY_SCORE: 13
ADLS_ACUITY_SCORE: 13
ADLS_ACUITY_SCORE: 11
ADLS_ACUITY_SCORE: 13
ADLS_ACUITY_SCORE: 13
ADLS_ACUITY_SCORE: 11
ADLS_ACUITY_SCORE: 13
ADLS_ACUITY_SCORE: 11
ADLS_ACUITY_SCORE: 13
ADLS_ACUITY_SCORE: 13
ADLS_ACUITY_SCORE: 11
PREVIOUS_RESPONSIBILITIES: MEAL PREP;HOUSEKEEPING;LAUNDRY;SHOPPING;YARDWORK;MEDICATION MANAGEMENT;FINANCES;DRIVING;WORK;CHILD CARE
ADLS_ACUITY_SCORE: 11
ADLS_ACUITY_SCORE: 13
ADLS_ACUITY_SCORE: 11

## 2022-05-01 NOTE — PLAN OF CARE
POD3. VSS. Pt reports LUE & R foot pain, controlled with IV Dil x3, Oxy x1, Hydroxyzine x2, Tylenol x1, & repositioning. Q4hr flap checks, appears well perfused, swollen, but soft & pink. Vioptix: 76-84%. Drsgs/ace wraps C/D/I. Shah with good output. Bedrest, LUE & RLE elevated at all times.

## 2022-05-01 NOTE — PROGRESS NOTES
"Plastic Surgery Progress Note    S: No acute events overnight. Pain improved, tolerating diet.    BP 99/59 (BP Location: Right arm)   Pulse 70   Temp 98.7  F (37.1  C) (Temporal)   Resp 17   Ht 1.727 m (5' 8\")   Wt 65.3 kg (143 lb 15.4 oz)   LMP 04/21/2022 (Exact Date)   SpO2 98%   BMI 21.89 kg/m    Gen: NAD,   Chest: Non-labored breathing  CV: RRR  Wound:   Left upper extremity is in an ACE wrap, digits are WWP with 2 sec cap refill, SILT in the m/r/u nerve distributions, patient able to flex and extend fingers without dramatic increase in pain  Right lower extremity, ACE and splint in place, flap is WWP with ~ 2 sec cap refill, strong audible doppler signals with triphasic arterial signal and venous augmentation    A/P  Reyna Martinez is a 31 year old  who is now s/p right great toe reconstruction with a left free radial forearm flap and right great toe extensor reconstruction with adjacent tendon graft on 4/28, recovering well, Vioptix numbers are solid and the flap does not appear congested   - Shah out when patient able to transfer to [facility]  - Regular diet  - ASA 81 mg daily and Lovenox 40 mg  - Right lower extremity must be strictly elevated at all times for the next few days  - Continue current pain regimen    Dispo: Pending how the dangle protocol proceeds    Aditya Pinto MD  Plastic Surgery       "

## 2022-05-01 NOTE — OP NOTE
PLASTIC SURGERY OPERATIVE REPORT     Date of Surgery: 4/28/2022  Surgical Service: Plastic Surgery     Preoperative Diagnosis:   1. Right great toe benign but locally aggressive recurrent symptomatic lesion consistent with dermatofibroma  2. Right dorsal foot wound with exposed metatarsophalangeal joint and loss of great toe extensor tendon status post wide local resection     Postoperative Diagnosis: Same as preoperative diagnoses     Procedures Performed:  1. Right dorsal foot irrigation and debridement using a sharp blade with additional deep and inferior skin margin excisions  2. Right extensor hallucis longus tendon reconstruction with use of right second toe extensor tendon graft  3. Right dorsal foot wound reconstruction with free skin flap taken from the left radial forearm, with additional complexity attributable to additional venous anastomoses as well as neurotization  4.  Primary neurorrhaphy between cut and of deep peroneal nerve and lateral antebrachial cutaneous nerve in the left forearm flap  5.  Placement of Integra bilaminar neodermis onto the left radial forearm flap donor site (4.5 cm x 3.5 cm)  6.  Placement of posterior splint onto the right leg  7.  Placement of volar wrist splint onto the left forearm     Attending:  DARIUS Adames  Assistant: CYNDEE Valdovinos     Complications: None apparent  Specimens: None  Implants: Integra bilaminar neodermis  Estimated blood loss: < 5 mL  Tourniquet time: Right lower extremity tourniquet time of 53 minutes.  Left arm tourniquet time of 75 minutes, followed by 15-minute break, and then an additional 51-minutes.  Wound classification: Clean contaminated  Anesthesia: General     Indications for Procedure: 31-year-old female with history of recurrent right dorsal foot benign locally aggressive lesion consistent with dermatofibroma now status post wide local resection with loss of great toe extensor and exposed MTP joint capsule, presenting for reconstruction.   After discussing all options for treatment, including amputation, MTP fusion with Integra versus negative pressure wound therapy, or great toe extensor reconstruction with free tissue coverage, patient consents to the latter.  Patient has a normal left upper extremity Dagoberto's test, which was confirmed with pencil Doppler, including adequate perfusion of the thumb.  Discussed the risks of surgery, including but not limited to: infection, bleeding, pain, poor scarring, injury to nerves and tendons, free flap loss, need for return to OR, need for additional surgery, wound healing issues, need for blood transfusion, need for leeching, DVT/PE, death. Despite these risks, patient consents to and would like to proceed with surgery.     Intraoperative findings: The right dorsal foot wound was clean with healthy granulation, but exposed joint capsule of the right first MTP and 5 cm segmental loss of extensor hallux is longus tendon with associated great toe flexion deformity.  Following debridement and excision of both additional deep and inferior skin margins, wound was irrigated with 3 L of saline and found to be clean.  Cut end of the peroneal nerve was identified and preserved.  Dorsal first metatarsal artery was identified and ligated.  Second toe extensor tendon was identified and harvested for great toe extensor tendon reconstruction.  This was done with exposure of the distal stump of the tendon, which was a least 2 cm in length, as well as freshening up of the proximal stump of the tendon.  Tendon reconstruction was done with multiple figure-of-eight sutures using 0 FiberWire distally, and 3 Pulvertaft weaves proximally reinforced with 0 FiberWire suture.  The right dorsalis pedis artery was spastic but adequate caliber of approximately 1.5 mm.  Vasospasticity improved with 10 minutes of papaverine and ultimately with strong spurt.  Left radial forearm free flap was harvested with no issues, including branches of  the cephalic and basilic veins incorporated into the superficial draining system of the flap.  There was a branch of the radial sensory nerve that was divided as it traveled through or near the pedicle.  There was an additional lateral antebrachial cutaneous nerve which traveled into the skin paddle which was divided proximally to be used for neurotization of the flap for sensation.  Transfer of the left forearm flap to the right foot proceeded with no issues.  Single artery anastomosis was done and and from the radial artery to the dorsalis pedis, with caliber of approximately 1 to 1.5 mm, done in conventional fashion with interrupted 8-0 nylon suture, with no revisions.  Three venous anastomoses were done including a 1.0 mm venae comitantes, a 2.0 mm branch of the cephalic vein, and a 1.5 mm branch of the basilic vein.  Total ischemia time for the flap was 1 hour and 16 minutes.  There was no bleeding underneath the flap during the inset.  The donor site was closed primarily except for a distal area which Integra was placed.  The left hand was well-perfused at the end of the case.  The right lower extremity free flap was also well perfused at the end of the case with strong arterial and venous Doppler signals, including Vioptix signals in the low 80s.     Description of Procedure: Patient was seen in the preoperative holding area.  Consent was verified.  The right foot, left forearm and the right thigh were marked.  All additional questions were answered.  Discussed the risks of surgery, including but not limited to: infection, bleeding, pain, poor scarring, injury to nerves and tendons, free flap loss, need for return to OR, need for additional surgery, wound healing issues, need for blood transfusion, need for leeching, DVT/PE, death. Despite these risks, patient consents to and would like to proceed with surgery. Patient was then transferred to the operating room and placed supine on the operating table.  All  pressure points were padded.  Sequential compression devices were placed on the left lower extremity and verified to be operational.  IV antibiotic prophylaxis was given.  Preinduction timeout was performed.    General anesthesia was induced.  Lawanda hugger was placed.  A tourniquet was placed on the left arm.  The left hand, wrist and forearm, the right circumferential foot, ankle and leg as well as the right thigh were prepped and draped in usual sterile fashion. A sterile tourniquet was placed on the right calf.  At the start of the case, additional 2 mm kelley-circumferential inferior skin margin was excised using a #15 blade.  This was sent as a permanent specimen.  Also, the deep margin was additionally excised and sent as a separate specimen for permanent pathology.  Part of the deep additional margin was the dorsal MTP joint capsule.  Of note, after discussion with Dr. Seals and two pathologists preoperatively, there was no indication in their view to send frozen sections.  Additionally, given the benign diagnosis, indolent course and good prognosis, that pathologist recommendation was to be conservative with additional resection.  After additional inferior skin and deep margin excision, the wound base was irrigated copiously with 3 L of sterile saline and gentle gauze debridement was also done.  After irrigation, the foot was redraped with sterile blue towels.  Since the metatarsal phalangeal joint was exposed after the deep margin debridement, figure-of-eight 3-0 Vicryl suture was used to close the capsule.  The right calf tourniquet was inflated to 250 mmHg. Next, a longitudinally oriented dorsal skin incision was made just proximal to the great toe eponychial fold to expose the distal extensor hallucis longus tendon stump.  The proximal tendon stump was secured by a Prolene stitch, which was removed, and the tendon was mobilized, and trimmed at the edge to freshen it.  Next, a curvilinear dorsal foot skin  incision was made.  Prior to skin incision, the foot was made dependent, superficial dorsal foot veins were marked out with the plan for isolation and use for additional flap drainage.  Once dorsal foot incision was made, care was taken to identify several additional dorsal foot veins which could be used for flap drainage.  These were isolated and mobilized using gentle tenotomy dissection and small branches were ligated with micro clips as needed.  These veins were Moistened with papaverine soaked cottonoids.  Next, the dorsalis pedis artery and associated venae comitantes were identified and isolated, found to be of adequate caliber, and a vessel loop was placed.  Next, the second toe long extensor tendon was identified.  The great toe extensor segmental defect was measured to be approximately 5 cm.  Consequently, the second toe long extensor tendon was harvested for a length of at least 9 cm, to allow for proximal Pulvertaft weaving.  Once that tendon graft was harvested, it was secured distally with figure-of-eight 0 FiberWire suture times two.  The distal tenorrhaphy was solid.  Next, the great toe was hyperextended and the proximal tenorrhaphy was done with three Pulvertaft weaves, secured with figure-of-eight 0 FiberWire suture.  The great toe extensor tendon reconstruction felt very solid at its completion.  At this point, the right dorsal foot wound was covered with moistened Ray-Krishna in the right calf tourniquet was taken down.     Attention was now given the left forearm flap harvest.  At the start of flap harvest, as was done at the beginning of the case, a Doppler Dagoberto's test was done.  It was confirmed that the left hand was perfused in an ulnar dominant fashion and the Dagoberto's test was normal.  Of note, the left thumb was well perfused with complete occlusion of the radial artery.  First, the lateral intermuscular septum was marked with a line extending from the antecubital fossa to the scaphoid  tubercle.  Additionally, the radial styloid and the FCR tendon were marked.  Doppler was used to also bhavani the radial artery in its course.  A stencil that was taken of the resulting right dorsal foot wound, with the dimensions of approximately 4.5 x 3.5 cm, was used to stencil the proposed skin flap harvest at the left forearm.  Since and lower extremity reconstruction, with the swelling, and the delicate microvascular surgery at the recipient site, and additional extension to the skin paddle was planned proximally.  Furthermore, the left hand was placed in a dependent position and superficial veins of the basilic and cephalic systems were also marked.  At the start of flap harvest, the ulnar-sided incision was made through the skin with care taken to preserve superficial veins of adequate caliber that were extending into the flap territory.  These were dissected carefully with tenotomy scissors and smaller branches were prospectively ligated with micro clips.  The same thing was done following skin incision along the radial aspect of the proposed skin paddle.  Of note, a large caliber branch of the basilic vein along with the cephalic vein were ligated proximally and preserved distally.  They were found to be entering the proposed skin paddle and likely contributing to the drainage of the flap.  Once these additional veins were harvested, papaverine soaked cottonoids were placed over them and they were kept moist.  Next, the flap was harvested in the suprafascial plane until reaching the FCR on the ulnar side and the brachioradialis tendon on the radial side.  Once at the FCR and BR tendons, sharp incision was made through the fascia and the dissection proceeded subfascial.  Next, the volar branch of the radial artery was identified distally, ligated and microclipped.  The distal continuation of the radial artery and its venae comitantes was also identified, ligated and microclipped.  A flap harvest then proceeded  from distal to proximal.  Any muscular branches coming from the pedicle were prospectively ligated and micro clipped.  Of note, care was taken not to shear the skin paddle from the pedicle at the level of the septum.  There was a branch of the superficial sensory radial nerve that had to be divided as it entered the flap and wrapped around the pedicle.  During the proximal aspect of the harvest, the lateral antebrachial cutaneous nerve was identified entering the flap in the skin territory of the skin paddle.  This nerve was isolated carefully, divided proximally and tagged for subsequent recipient site neurorrhaphy.  The pedicle was harvested almost to the antecubital fossa, at a level where the size match with the recipient site was appropriate and the length of the pedicle was adequate.  Once the flap was fully harvested, the tourniquet on the arm was taken down.  Additional hemostasis was done with micro clips as needed as well as bipolar cautery.  At this point, the flap was stapled back into place, found to be very adequately perfused, and attention was then placed back onto the right foot for final preparation of recipient site vessels.     Under microscope, the dorsalis pedis artery and its venae comitantes were prepared.  It was found that the dorsalis pedis artery was approximately 1 to 1.5 mm, but it appeared vasospastic.  The associated venae comitantes were very small, with the largest only 0.8-0.9 millimeters.  Once the dorsalis pedis artery and vein were circumferentially prepared, they were distally ligated with micro clips, clamped proximally with micro clamps, and flushed with heparinized saline.  When the micro clamp on the artery was removed, there was very little spurt initially.  This was corrected by placing a papaverine soaked cottonoid onto the artery and vein, and allowing the vasospasm to resolve over the next 5 to 10 minutes.  Prior to flap transfer, also additional dorsal foot veins were  prepared in the same fashion by distally clipping, gently proximally dissecting, and ligating as well as clipping any additional branches.  Self-retaining retractors were placed in preparation for flap transfer.  The left dorsal foot wound was again covered with a moistened Ray-Krishna.     At this stage, attention was given the left forearm flap harvest.  The flap pedicle was ligated with multiple medium sized clips.  Once done, the flap was transferred to the right foot.  Care was taken to gently dissect the flap pedicle artery from the largest of the venae comitantes.  Under microscope, the arterial anastomosis was done in end-to-end fashion using the dorsalis pedis artery.  The arterial anastomosis was done with simple interrupted 8-0 nylon suture.  Next, the end-to-end venous anastomosis was done with a 1.0 mm  for the largest of the venae comitantes.  Next, one of the large dorsal foot veins on the tibial aspect of the dorsal foot was anastomosed to the branch of the cephalic vein on the flap.  This was done with a 2.0 mm .  At this point, the micro clamps were removed and the ischemia time was done.  There was immediate reflow with excellent skin Doppler signal.  At this time, a third venous anastomosis was done on the fibular side of the dorsal foot using a branch of the basilic vein, and a 1.5 mm .  Of note, there were no micro revisions needed, with arterial flow throughout.  The veins were patent.  At this point, an end to end neurorrhaphy was done after freshly trimming the edges of the cut end of the deep peroneal nerve and the lateral antebrachial cutaneous nerve in the flap, as a means to sensitize the skin paddle.  The neurorrhaphy was done using simple interrupted 8-0 nylon suture.  The pedicle layer was confirmed to be gradual with no kinks.  All the veins that were anastomosed were also laid down to prevent kinking.  The flap was then inset in a way that would not cause  compression of any of the veins or the pedicle.  Care was taken to identify any branches that were bleeding and and perform additional ligation with micro clips under the microscope.  There were two small branches on the flap that needed additional micro clipping prior to full inset.  The flap skin paddle inset was done with 3-0 Vicryl suture in the deep dermis and 4-0 chromic suture in the skin.  There is no compression on the pedicle.  There was excellent skin paddle turgor, color, stable 2-second capillary refill with no congestion, and excellent skin Doppler signals throughout the entirety of the case.  The left forearm was closed primarily proximally using 3-0 Vicryl suture in the deep dermis and 3-0 Monocryl suture in intracuticular skin closure.  The distal remaining wound was closed down with 3-0 Vicryl suture between the deep dermis and the underlying muscle.  The FCR tendon was covered by pulling over the flexor digitorum superficialis muscle belly using 3-0 Vicryl suture.  There was a three-dimensional contour concavity, that if skin grafted would appear aesthetically displeasing, so the decision was made to place Integra over the donor site.  This was secured in place using 4-0 chromic running suture with care taken to ensure the collagen layer of the Integra was placed down.  The Integra was bolstered in place using a Adaptic and sponge bolster dressing.  This was secured with staples.  The forearm incision was dressed with Xeroform and bacitracin.  The left forearm was gently wrapped with cast padding and a volar forearm-based wrist neutral splint was placed with the fingers free.  Of note, the left hand was well-perfused and warm.  The right foot was dressed with Xeroform and bacitracin along the flap inset closure.  A Vioptix probe was placed on the distal aspect of the flap skin paddle, and found to be with greater than 90% signal quality and an oxygen saturation of greater than 80%.  This was  stable throughout the case.  The left foot, ankle and leg were wrapped with cast padding and a posterior blocking splint was placed, with the plaster extending distal to the great toe to support the extensor tendon reconstruction.  At this point, the right foot was elevated on several pillows.  The patient was then woken up from general anesthesia, and transferred to a stretcher with no events.  Care was taken to ensure that the right foot was strictly elevated.  Nursing was instructed to ensure that this was done.  Patient was then transferred to the postanesthesia care unit with no events.     Postoperative plan:  Admitted to the floor for frequent flap monitoring including strict right foot elevation.  Bedrest for the first several days.  PT evaluation for possible assisted transfer into a wheelchair with strict right foot elevation.  Every hour flap checks including Vioptix.  Planned initiation of dangle protocol early next week with the assistance of Vioptix.      Thom Adames MD, PhD

## 2022-05-01 NOTE — PROVIDER NOTIFICATION
Notified provider regarding pt not voiding since martinez removal at 12pm. Pt bladder scanned at 181. Provider said to notify if pt does not void by 2100. Insturcted pt to continue to take in PO fluids.     Addendum: pt voided at 1830.

## 2022-05-01 NOTE — PROGRESS NOTES
05/01/22 0900   Quick Adds   Type of Visit Initial Occupational Therapy Evaluation   Living Environment   People in Home spouse   Current Living Arrangements house   Home Accessibility stairs to enter home  (2)   Number of Stairs, Main Entrance 2   Living Environment Comments Pt reports living in a rambler house with her  at baseline. Pt reports 2-3 steps to enter. Has tub/shower and walk in shower.   Self-Care   Usual Activity Tolerance excellent   Current Activity Tolerance moderate   Equipment Currently Used at Home shower chair;other (see comments)  (knee scooter)   Activity/Exercise/Self-Care Comment Pt reports no activity concerns at baseline. 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   Instrumental Activities of Daily Living (IADL)   Previous Responsibilities meal prep;housekeeping;laundry;shopping;yardwork;medication management;finances;driving;work;   IADL Comments Pt and  share IADL responsibilities at baseline. 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   General Information   Onset of Illness/Injury or Date of Surgery 04/28/22   Referring Physician Aditya Pinto MD   Patient/Family Therapy Goal Statement (OT) return home as able   Additional Occupational Profile Info/Pertinent History of Current Problem Reyna Martinez is a 31 year old  who is now s/p right great toe reconstruction with a left free radial forearm flap and right great toe extensor reconstruction with adjacent tendon graft on 4/28, recovering well, Vioptix numbers are solid and the flap does not appear congested   Existing Precautions/Restrictions other (see comments)  (Right lower extremity must be strictly elevated at all times for the next few days)   General Observations and Info Pt supine in bed upon arrival, agreeable to therapy.   Cognitive Status Examination   Orientation Status orientation to person, place and time   Cognitive Status Comments no cog issues reported   Sensory   Sensory Comments pt reports this is improving since surgery   Pain  "Assessment   Patient Currently in Pain Yes, see Vital Sign flowsheet   Integumentary/Edema   Integumentary/Edema no deficits were identifed   Range of Motion Comprehensive   Comment, General Range of Motion UE appear WFL, LE not formally test due to post surgical precautions   Strength Comprehensive (MMT)   General Manual Muscle Testing (MMT) Assessment no strength deficits identified   Clinical Impression   Criteria for Skilled Therapeutic Interventions Met (OT) Yes, treatment indicated   OT Diagnosis OT order for \"Lower extremity free flap with left arm as the donor site\"   Influenced by the following impairments post surgical precautions, activity tolerance   OT Problem List-Impairments impacting ADL problems related to;activity tolerance impaired;pain;post-surgical precautions   Assessment of Occupational Performance 1-3 Performance Deficits   Identified Performance Deficits home mgmt, bathing, toileting, dressing   Planned Therapy Interventions (OT) ADL retraining;IADL retraining;transfer training;progressive activity/exercise;home program guidelines;risk factor education   Clinical Decision Making Complexity (OT) low complexity   Anticipated Equipment Needs Upon Discharge (OT)   (TBD with further therapy)   Risk & Benefits of therapy have been explained evaluation/treatment results reviewed;patient;spouse/significant other   OT Discharge Planning   OT Discharge Recommendation (DC Rec) home with assist   OT Rationale for DC Rec Pending LOS, anticipate pt will be able to return home with increased assist for ADL/IADL due to post surgical restrictions.   OT Brief overview of current status Ax2 to transfer to [facility], elevating affected RLE greater than parallel to the floor the entire time. LUE WBAT per MD.   Total Evaluation Time (Minutes)   Total Evaluation Time (Minutes) 5   OT Goals   Therapy Frequency (OT) Daily   OT Predicted Duration/Target Date for Goal Attainment 05/16/22   OT Goals Toilet " Transfer/Toileting;OT Goal 1;Hygiene/Grooming   OT: Hygiene/Grooming modified independent   OT: Toilet Transfer/Toileting Modified independent;within precautions   OT: Goal 1 Pt will demo safety during shower transfer with use of shower chair with SBA.

## 2022-05-01 NOTE — PROGRESS NOTES
"PRS    No issues. Pain much better. Shah out, but still has not voided. Worked with PT. Continues with strict R foot elevation. Spaced out flap checks has made it possible for her to sleep better.     BP 99/58 (BP Location: Right arm)   Pulse 71   Temp 98.1  F (36.7  C) (Temporal)   Resp 18   Ht 1.727 m (5' 8\")   Wt 65.3 kg (143 lb 15.4 oz)   LMP 04/21/2022 (Exact Date)   SpO2 98%   BMI 21.89 kg/m    Not distressed  Non-labored breathing  R foot flap is viable, 2.5 s cap refill, soft, warm, less swelling, strong several (3) venous and triphasic arterial pencil Doppler signals, stable Vioptix readings in low 80s with signal quality >90. Left hand slightly swollen, improved, but well-perfused with normal sensation in all digits    UOP 2250 since midnight    A/P: 31F POD #3 s/p R foot wound debridement, R great toe extensor, L free radial forearm flap recon, doing well    -Strict R foot elevation at all times. She will be with strict R foot elevation through the weekend. Patient understands the importance of this and is agreeable.   -Continue flap checks Q4H. Continue Vioptix  -Enhanced recovery pain protocol: Tylenol. Gabapentin. Oxycodone. Meloxicam. Valium.   -Continue ASA 81 daily  -Continue DVT chemoprophylaxis  -Regular diet, all IVF stopped. Encouraged PO intake.  -IV Ancef or PO Keflex for next few days  -Anticipate start of controlled dangle either tomorrow or Tuesday with use of Vioptix to help accelerate/optimize protocol. We will take down R leg splint tomorrow.   -Left forearm splint will be taken down on POD 6-7     Thom Adames MD, PhD  "

## 2022-05-01 NOTE — PROGRESS NOTES
Pt A&Ox4. Flap monitored Q4H. Flap appears well perfused, warm, and soft. OT stopped by and helped pt sit on the commode with RLE elevated on the chair. Voids spontaneously. +flatus, no BM.  is supportive. In general, pt appears much more comfortable than yesterday. Shah removed without any issues.

## 2022-05-01 NOTE — PLAN OF CARE
AOx4. Pt on bedrest besides when needing to use bedside commode. Pt needs to keep leg elevated at all times. Pt RLE elevated on wedge pillow while in bed. Pt up with assist x2 using GB. Tolerating reg diet. +flatus but no BM reported. Shah removed this afternoon; voided x1 since removal. RLE + doppler, warm, soft, pink and ViOptix currently WDL. ViOptix stopped reading and had to be reinforced; providers aware. LUE elevated on pillows. Pt pain managed with Dilaudid, atarax, Oxycodone, Tylenol and repositioning. PIV DAVID.

## 2022-05-02 ENCOUNTER — APPOINTMENT (OUTPATIENT)
Dept: OCCUPATIONAL THERAPY | Facility: CLINIC | Age: 32
DRG: 465 | End: 2022-05-02
Attending: STUDENT IN AN ORGANIZED HEALTH CARE EDUCATION/TRAINING PROGRAM
Payer: COMMERCIAL

## 2022-05-02 ENCOUNTER — APPOINTMENT (OUTPATIENT)
Dept: PHYSICAL THERAPY | Facility: CLINIC | Age: 32
DRG: 465 | End: 2022-05-02
Attending: STUDENT IN AN ORGANIZED HEALTH CARE EDUCATION/TRAINING PROGRAM
Payer: COMMERCIAL

## 2022-05-02 LAB
PATH REPORT.COMMENTS IMP SPEC: NORMAL
PATH REPORT.COMMENTS IMP SPEC: NORMAL
PATH REPORT.FINAL DX SPEC: NORMAL
PATH REPORT.GROSS SPEC: NORMAL
PATH REPORT.MICROSCOPIC SPEC OTHER STN: NORMAL
PATH REPORT.RELEVANT HX SPEC: NORMAL
PHOTO IMAGE: NORMAL

## 2022-05-02 PROCEDURE — 97535 SELF CARE MNGMENT TRAINING: CPT | Mod: GO

## 2022-05-02 PROCEDURE — 250N000011 HC RX IP 250 OP 636: Performed by: STUDENT IN AN ORGANIZED HEALTH CARE EDUCATION/TRAINING PROGRAM

## 2022-05-02 PROCEDURE — 250N000013 HC RX MED GY IP 250 OP 250 PS 637: Performed by: STUDENT IN AN ORGANIZED HEALTH CARE EDUCATION/TRAINING PROGRAM

## 2022-05-02 PROCEDURE — 97530 THERAPEUTIC ACTIVITIES: CPT | Mod: GP

## 2022-05-02 PROCEDURE — 120N000002 HC R&B MED SURG/OB UMMC

## 2022-05-02 RX ORDER — POLYETHYLENE GLYCOL 3350 17 G/17G
17 POWDER, FOR SOLUTION ORAL 2 TIMES DAILY
Status: DISCONTINUED | OUTPATIENT
Start: 2022-05-02 | End: 2022-05-06 | Stop reason: HOSPADM

## 2022-05-02 RX ORDER — SIMETHICONE 80 MG
80 TABLET,CHEWABLE ORAL EVERY 6 HOURS PRN
Status: DISCONTINUED | OUTPATIENT
Start: 2022-05-02 | End: 2022-05-06 | Stop reason: HOSPADM

## 2022-05-02 RX ORDER — HYDROMORPHONE HCL IN WATER/PF 6 MG/30 ML
0.2 PATIENT CONTROLLED ANALGESIA SYRINGE INTRAVENOUS EVERY 4 HOURS PRN
Status: DISCONTINUED | OUTPATIENT
Start: 2022-05-02 | End: 2022-05-06 | Stop reason: HOSPADM

## 2022-05-02 RX ORDER — CEPHALEXIN 500 MG/1
500 CAPSULE ORAL EVERY 6 HOURS SCHEDULED
Status: DISCONTINUED | OUTPATIENT
Start: 2022-05-02 | End: 2022-05-06 | Stop reason: HOSPADM

## 2022-05-02 RX ADMIN — ACETAMINOPHEN 1000 MG: 500 TABLET ORAL at 13:29

## 2022-05-02 RX ADMIN — POLYETHYLENE GLYCOL 3350 17 G: 17 POWDER, FOR SOLUTION ORAL at 19:18

## 2022-05-02 RX ADMIN — POLYETHYLENE GLYCOL 3350 17 G: 17 POWDER, FOR SOLUTION ORAL at 10:21

## 2022-05-02 RX ADMIN — SIMETHICONE 80 MG: 80 TABLET, CHEWABLE ORAL at 15:31

## 2022-05-02 RX ADMIN — ACETAMINOPHEN 1000 MG: 500 TABLET ORAL at 19:15

## 2022-05-02 RX ADMIN — GABAPENTIN 300 MG: 300 CAPSULE ORAL at 19:16

## 2022-05-02 RX ADMIN — ASPIRIN 81 MG CHEWABLE TABLET 81 MG: 81 TABLET CHEWABLE at 08:32

## 2022-05-02 RX ADMIN — GABAPENTIN 300 MG: 300 CAPSULE ORAL at 13:29

## 2022-05-02 RX ADMIN — OXYCODONE HYDROCHLORIDE 10 MG: 10 TABLET ORAL at 23:41

## 2022-05-02 RX ADMIN — ACETAMINOPHEN 1000 MG: 500 TABLET ORAL at 08:32

## 2022-05-02 RX ADMIN — ACETAMINOPHEN 1000 MG: 500 TABLET ORAL at 01:21

## 2022-05-02 RX ADMIN — HYDROXYZINE HYDROCHLORIDE 25 MG: 25 TABLET, FILM COATED ORAL at 05:19

## 2022-05-02 RX ADMIN — HYDROMORPHONE HYDROCHLORIDE 0.2 MG: 0.2 INJECTION, SOLUTION INTRAMUSCULAR; INTRAVENOUS; SUBCUTANEOUS at 08:42

## 2022-05-02 RX ADMIN — ENOXAPARIN SODIUM 40 MG: 40 INJECTION SUBCUTANEOUS at 08:32

## 2022-05-02 RX ADMIN — MELOXICAM 7.5 MG: 7.5 TABLET ORAL at 19:16

## 2022-05-02 RX ADMIN — HYDROMORPHONE HYDROCHLORIDE 0.4 MG: 0.2 INJECTION, SOLUTION INTRAMUSCULAR; INTRAVENOUS; SUBCUTANEOUS at 01:22

## 2022-05-02 RX ADMIN — SENNOSIDES AND DOCUSATE SODIUM 1 TABLET: 8.6; 5 TABLET ORAL at 08:32

## 2022-05-02 RX ADMIN — CALCIUM CARBONATE (ANTACID) CHEW TAB 500 MG 500 MG: 500 CHEW TAB at 18:30

## 2022-05-02 RX ADMIN — CEPHALEXIN 500 MG: 500 CAPSULE ORAL at 15:31

## 2022-05-02 RX ADMIN — GABAPENTIN 300 MG: 300 CAPSULE ORAL at 08:32

## 2022-05-02 RX ADMIN — SENNOSIDES AND DOCUSATE SODIUM 1 TABLET: 8.6; 5 TABLET ORAL at 19:16

## 2022-05-02 RX ADMIN — CEPHALEXIN 500 MG: 500 CAPSULE ORAL at 21:29

## 2022-05-02 RX ADMIN — OXYCODONE HYDROCHLORIDE 10 MG: 10 TABLET ORAL at 17:21

## 2022-05-02 RX ADMIN — OXYCODONE HYDROCHLORIDE 10 MG: 10 TABLET ORAL at 11:21

## 2022-05-02 RX ADMIN — OXYCODONE HYDROCHLORIDE 10 MG: 10 TABLET ORAL at 05:19

## 2022-05-02 RX ADMIN — ONDANSETRON 4 MG: 2 INJECTION INTRAMUSCULAR; INTRAVENOUS at 19:10

## 2022-05-02 RX ADMIN — SIMETHICONE 80 MG: 80 TABLET, CHEWABLE ORAL at 21:29

## 2022-05-02 RX ADMIN — CALCIUM CARBONATE (ANTACID) CHEW TAB 500 MG 500 MG: 500 CHEW TAB at 14:48

## 2022-05-02 RX ADMIN — RIZATRIPTAN BENZOATE 5 MG: 5 TABLET ORAL at 08:32

## 2022-05-02 ASSESSMENT — ACTIVITIES OF DAILY LIVING (ADL)
ADLS_ACUITY_SCORE: 10
ADLS_ACUITY_SCORE: 11
ADLS_ACUITY_SCORE: 9
ADLS_ACUITY_SCORE: 11
ADLS_ACUITY_SCORE: 11
ADLS_ACUITY_SCORE: 10
ADLS_ACUITY_SCORE: 11
ADLS_ACUITY_SCORE: 10
ADLS_ACUITY_SCORE: 11
ADLS_ACUITY_SCORE: 10
ADLS_ACUITY_SCORE: 9
ADLS_ACUITY_SCORE: 11

## 2022-05-02 NOTE — PROGRESS NOTES
"Plastic Surgery Progress Note  05/02/22    S: Doing well. Able to leave room in a wheelchair yesterday. R foot elevated at all times. Pain managed, discussed weaning from IV pain meds today. No nausea/vomiting. No bowel movement, will work with OT to get to commode today.     O: BP 92/59 (BP Location: Right arm)   Pulse 67   Temp 98.3  F (36.8  C) (Temporal)   Resp 16   Ht 1.727 m (5' 8\")   Wt 65.3 kg (143 lb 15.4 oz)   LMP 04/21/2022 (Exact Date)   SpO2 96%   BMI 21.89 kg/m    NAD, NLB, Reg rate  R foot with splint in place, elevated on pillows, flap well perfused, pink, 2 cap refill, strong doppler. Incisions c/d/i  LUE in splint, digits well perfused, cap refill 2 sec, able to move all digits, slightly swollen     Vioptix - 80%    A/p: 31F POD4 R great toe recon w/ L free RFF with R great toe extensor recon (4/28)   - Strict R foot elevation, needs to be parallel at all times. Will discuss later today regarding dangle protocol   - Anticipate RLE splint to be taken down later today   - Flap checks q4h, vioptix   - Decrease IV pain meds today   - Asa and lovenox   - Regular diet, bowel meds   - Left forearm splint will be taken down on POD 6-7    Jaquelin Spencer MD  "

## 2022-05-02 NOTE — PROGRESS NOTES
"PRS     No issues. R foot splint off today. Much more awake. Pain continues to improve.      BP 97/57   Pulse 72   Temp 99.5  F (37.5  C) (Temporal)   Resp 16   Ht 1.727 m (5' 8\")   Wt 65.3 kg (143 lb 15.4 oz)   LMP 04/21/2022 (Exact Date)   SpO2 98%   BMI 21.89 kg/m    Not distressed  Non-labored breathing  R foot flap is viable, 2.5 s cap refill, soft, warm, less swelling, strong several (3) venous and triphasic arterial pencil Doppler signals, stable Vioptix readings in low 80s with signal quality >90. Left hand slightly swollen, improved, but well-perfused with normal sensation in all digits      since midnight     A/P: 31F POD #4 s/p R foot wound debridement, R great toe extensor, L free radial forearm flap recon, doing well     -Strict R foot elevation at all times.  Patient understands the importance of this and is agreeable.   -Continue flap checks Q4H. Continue Vioptix  -Enhanced recovery pain protocol: Tylenol. Gabapentin. Oxycodone. Meloxicam. Valium.   -Continue ASA 81 daily  -Continue DVT chemoprophylaxis  -Regular diet, all IVF stopped. Encouraged PO intake.  -IV Ancef or PO Keflex  -Anticipate start of controlled dangle on Tuesday with use of Vioptix to help accelerate/optimize protocol.    -Left forearm splint will be taken down on POD 6-7     Thom Adames MD, PhD  "

## 2022-05-02 NOTE — PLAN OF CARE
POD4. VSS. Pt reports LUE & R foot pain, controlled with IV Dil x1, Oxy x2, Hydroxyzine x2, Tylenol x1, & repositioning. Q4hr flap checks: appears well perfused, swollen with tiny purplish spot on the edge, but otherwise soft & pink. Vioptix: 77-83%. Drsgs/ace wraps C/D/I. Bedrest besides commode, LUE & RLE elevated at all times.

## 2022-05-02 NOTE — PLAN OF CARE
Pt vss and A&Ox4. On bedrest with bedside commode priviledges- x1 assist with transfers to commode. RLE continuously elevated. Pain controlled with PO Tylenol, PO Gabapentin, PO Oxycodone, and IV Dilaudid. Denies nausea and tolerating regular diet well. Voiding spontaneously, passing gas, and had 1x small, liquid BM this shift. RLE flap checks q4h. Site is soft, warm, pink, with +doppler- cast removed by providers this shift & some bruising and swelling noted. Provider will return later today for dangle protocol. MIGUEL LUE flap d/t cast but is elevated. R PIV x2 are saline locked. Will continue plan of care.

## 2022-05-03 ENCOUNTER — APPOINTMENT (OUTPATIENT)
Dept: PHYSICAL THERAPY | Facility: CLINIC | Age: 32
DRG: 465 | End: 2022-05-03
Attending: STUDENT IN AN ORGANIZED HEALTH CARE EDUCATION/TRAINING PROGRAM
Payer: COMMERCIAL

## 2022-05-03 ENCOUNTER — APPOINTMENT (OUTPATIENT)
Dept: OCCUPATIONAL THERAPY | Facility: CLINIC | Age: 32
DRG: 465 | End: 2022-05-03
Attending: STUDENT IN AN ORGANIZED HEALTH CARE EDUCATION/TRAINING PROGRAM
Payer: COMMERCIAL

## 2022-05-03 PROCEDURE — 97535 SELF CARE MNGMENT TRAINING: CPT | Mod: GO

## 2022-05-03 PROCEDURE — 250N000013 HC RX MED GY IP 250 OP 250 PS 637: Performed by: STUDENT IN AN ORGANIZED HEALTH CARE EDUCATION/TRAINING PROGRAM

## 2022-05-03 PROCEDURE — 250N000011 HC RX IP 250 OP 636: Performed by: STUDENT IN AN ORGANIZED HEALTH CARE EDUCATION/TRAINING PROGRAM

## 2022-05-03 PROCEDURE — 120N000002 HC R&B MED SURG/OB UMMC

## 2022-05-03 PROCEDURE — 97110 THERAPEUTIC EXERCISES: CPT | Mod: GP

## 2022-05-03 PROCEDURE — 97530 THERAPEUTIC ACTIVITIES: CPT | Mod: GP

## 2022-05-03 RX ADMIN — GABAPENTIN 300 MG: 300 CAPSULE ORAL at 19:57

## 2022-05-03 RX ADMIN — ACETAMINOPHEN 1000 MG: 500 TABLET ORAL at 17:11

## 2022-05-03 RX ADMIN — SENNOSIDES AND DOCUSATE SODIUM 1 TABLET: 8.6; 5 TABLET ORAL at 08:04

## 2022-05-03 RX ADMIN — ACETAMINOPHEN 1000 MG: 500 TABLET ORAL at 04:01

## 2022-05-03 RX ADMIN — SIMETHICONE 80 MG: 80 TABLET, CHEWABLE ORAL at 08:04

## 2022-05-03 RX ADMIN — CEPHALEXIN 500 MG: 500 CAPSULE ORAL at 04:01

## 2022-05-03 RX ADMIN — ACETAMINOPHEN 1000 MG: 500 TABLET ORAL at 22:48

## 2022-05-03 RX ADMIN — ASPIRIN 81 MG CHEWABLE TABLET 81 MG: 81 TABLET CHEWABLE at 08:04

## 2022-05-03 RX ADMIN — ENOXAPARIN SODIUM 40 MG: 40 INJECTION SUBCUTANEOUS at 08:04

## 2022-05-03 RX ADMIN — GABAPENTIN 300 MG: 300 CAPSULE ORAL at 08:04

## 2022-05-03 RX ADMIN — MELOXICAM 7.5 MG: 7.5 TABLET ORAL at 20:25

## 2022-05-03 RX ADMIN — CEPHALEXIN 500 MG: 500 CAPSULE ORAL at 10:20

## 2022-05-03 RX ADMIN — SENNOSIDES AND DOCUSATE SODIUM 1 TABLET: 8.6; 5 TABLET ORAL at 19:57

## 2022-05-03 RX ADMIN — OXYCODONE HYDROCHLORIDE 5 MG: 5 TABLET ORAL at 20:28

## 2022-05-03 RX ADMIN — BISACODYL 10 MG: 10 SUPPOSITORY RECTAL at 14:40

## 2022-05-03 RX ADMIN — ACETAMINOPHEN 1000 MG: 500 TABLET ORAL at 10:20

## 2022-05-03 RX ADMIN — CEPHALEXIN 500 MG: 500 CAPSULE ORAL at 22:48

## 2022-05-03 RX ADMIN — GABAPENTIN 300 MG: 300 CAPSULE ORAL at 14:36

## 2022-05-03 RX ADMIN — CEPHALEXIN 500 MG: 500 CAPSULE ORAL at 17:11

## 2022-05-03 ASSESSMENT — ACTIVITIES OF DAILY LIVING (ADL)
ADLS_ACUITY_SCORE: 11
ADLS_ACUITY_SCORE: 12
ADLS_ACUITY_SCORE: 11
ADLS_ACUITY_SCORE: 11
ADLS_ACUITY_SCORE: 12
ADLS_ACUITY_SCORE: 11
ADLS_ACUITY_SCORE: 12
ADLS_ACUITY_SCORE: 11
ADLS_ACUITY_SCORE: 12
ADLS_ACUITY_SCORE: 12
ADLS_ACUITY_SCORE: 11
ADLS_ACUITY_SCORE: 12

## 2022-05-03 NOTE — PROGRESS NOTES
"Plastic Surgery Progress Note  05/03/22    S: Doing well. Pain well controlled- able to wean off IV pain medication. R foot elevated at all times.  No nausea/vomiting. No bowel movement yet, having some abdominal discomfort.     O: /66 (BP Location: Right arm)   Pulse 70   Temp 98.8  F (37.1  C) (Temporal)   Resp 18   Ht 1.727 m (5' 8\")   Wt 65.3 kg (143 lb 15.4 oz)   LMP 04/21/2022 (Exact Date)   SpO2 97%   BMI 21.89 kg/m    NAD, NLB, Reg rate  R foot with splint in place, elevated on pillows, flap well perfused, pink, 2 cap refill, strong doppler. Incisions c/d/i  LUE in splint, digits well perfused, cap refill 2 sec, able to move all digits, slightly swollen     Vioptix - 80%    A/p: 31F POD5 R great toe recon w/ L free RFF with R great toe extensor recon (4/28)     - Strict R foot elevation, needs to be parallel at all times. Will discuss dangling protocol with staff. Ancipitate starting protocol today    - Flap checks q4h, vioptix   - Decrease IV pain meds today, c/w multimodal pain control    - Asa and lovenox   - Regular diet, bowel meds   - Left forearm splint will be taken down on POD 6-7    Todd Johnson MD   Plastic & Reconstructive Surgery PGY-2   "

## 2022-05-03 NOTE — PLAN OF CARE
Pt vss and A&Ox4. On bedrest with bedside commode privileges- SBA with transfers to commode. RLE continuously elevated- pt dangled x2 this shift with plastic surgery team and tolerated well- pt to dangle with bedside RN 1x this evening. RLE flap checks q4h. Site is soft, warm, pink, with +doppler. MIGUEL LUE due to cast- LUE is also continuously elevated. Pain controlled with PO Tylenol and PO Gabapentin. Voiding spontaneously, passing gas, and 1x small, watery BM this shift- suppository given this shift. R PIV is saline locked. Will continue plan of care.

## 2022-05-03 NOTE — PROGRESS NOTES
"PRS     No issues. Off all IV pain medications.      /66 (BP Location: Right arm)   Pulse 70   Temp 98.8  F (37.1  C) (Temporal)   Resp 18   Ht 1.727 m (5' 8\")   Wt 65.3 kg (143 lb 15.4 oz)   LMP 04/21/2022 (Exact Date)   SpO2 97%   BMI 21.89 kg/m    Not distressed  Non-labored breathing  R foot flap is viable, 2.5 s cap refill, soft, warm, less swelling, strong several (3) venous and triphasic arterial pencil Doppler signals, stable Vioptix readings in low 80s with signal quality >90. Left hand slightly swollen, improved, but well-perfused with normal sensation in all digits     UOP 1500 since midnight     A/P: 31F POD #5 s/p R foot wound debridement, R great toe extensor, L free radial forearm flap recon, doing well     -Strict R foot elevation at all times.  Patient understands the importance of this and is agreeable.   -Started R foot dangle today. Tolerated 15 minutes with no issues. Will perform another 15 minute dangle with residents at around 12-1p and again this evening with nursing at 7-8p.     -Continue flap checks Q4H. Continue Vioptix  -Enhanced recovery pain protocol: Tylenol. Gabapentin. Oxycodone. Meloxicam. Valium.   -Continue ASA 81 daily  -Continue DVT chemoprophylaxis  -Regular diet, all IVF stopped. Encouraged PO intake.  -IV Ancef or PO Keflex  -Left forearm splint will be taken down on POD 6-7     Thom Adames MD, PhD  "

## 2022-05-03 NOTE — PLAN OF CARE
Time: 4103-2838    Activity: Bedrest w/ bedside commode privileges- Ax1 to commode. RLE continuously elevated.     Neuro:  A&Ox4, calm and cooperative    GI/:  Pt voiding spontaneously into commode, reports passing gas, and did not have BM this shift. C/o nausea when getting up to use commode and PRN Zofran given at 1910 w/ relief.    Diet:  Reg, tolerating well    Incisions/Drains: R lower PIV- SL. RLE flap which is warm, pink, w/ + doppler, bruises noted. LUE flap- UTV d/t splint.     Vitals: AVSS on RA    Pain: Reports pressure/pain in abdomen, managed w/ PRN Simethicone x2, PRN Oxycodone x1, PRN Tums x1, and scheduled Tylenol & Gabapentin.     New Changes This Shift: Right upper PIV infiltrated and discontinued.     Plan: Cont RLE flap checks q 4 hrs. Cont POC.

## 2022-05-04 ENCOUNTER — APPOINTMENT (OUTPATIENT)
Dept: OCCUPATIONAL THERAPY | Facility: CLINIC | Age: 32
DRG: 465 | End: 2022-05-04
Attending: STUDENT IN AN ORGANIZED HEALTH CARE EDUCATION/TRAINING PROGRAM
Payer: COMMERCIAL

## 2022-05-04 LAB
HOLD SPECIMEN: NORMAL
PLATELET # BLD AUTO: 287 10E3/UL (ref 150–450)

## 2022-05-04 PROCEDURE — 250N000011 HC RX IP 250 OP 636: Performed by: STUDENT IN AN ORGANIZED HEALTH CARE EDUCATION/TRAINING PROGRAM

## 2022-05-04 PROCEDURE — 250N000013 HC RX MED GY IP 250 OP 250 PS 637: Performed by: STUDENT IN AN ORGANIZED HEALTH CARE EDUCATION/TRAINING PROGRAM

## 2022-05-04 PROCEDURE — 85049 AUTOMATED PLATELET COUNT: CPT | Performed by: STUDENT IN AN ORGANIZED HEALTH CARE EDUCATION/TRAINING PROGRAM

## 2022-05-04 PROCEDURE — 120N000002 HC R&B MED SURG/OB UMMC

## 2022-05-04 PROCEDURE — 36415 COLL VENOUS BLD VENIPUNCTURE: CPT | Performed by: STUDENT IN AN ORGANIZED HEALTH CARE EDUCATION/TRAINING PROGRAM

## 2022-05-04 PROCEDURE — 97760 ORTHOTIC MGMT&TRAING 1ST ENC: CPT | Mod: GO | Performed by: OCCUPATIONAL THERAPIST

## 2022-05-04 RX ADMIN — GABAPENTIN 300 MG: 300 CAPSULE ORAL at 19:05

## 2022-05-04 RX ADMIN — CEPHALEXIN 500 MG: 500 CAPSULE ORAL at 22:09

## 2022-05-04 RX ADMIN — ACETAMINOPHEN 1000 MG: 500 TABLET ORAL at 17:37

## 2022-05-04 RX ADMIN — GABAPENTIN 300 MG: 300 CAPSULE ORAL at 08:09

## 2022-05-04 RX ADMIN — GABAPENTIN 300 MG: 300 CAPSULE ORAL at 14:09

## 2022-05-04 RX ADMIN — ACETAMINOPHEN 1000 MG: 500 TABLET ORAL at 03:54

## 2022-05-04 RX ADMIN — CEPHALEXIN 500 MG: 500 CAPSULE ORAL at 15:40

## 2022-05-04 RX ADMIN — MELOXICAM 7.5 MG: 7.5 TABLET ORAL at 19:05

## 2022-05-04 RX ADMIN — ENOXAPARIN SODIUM 40 MG: 40 INJECTION SUBCUTANEOUS at 08:10

## 2022-05-04 RX ADMIN — OXYCODONE HYDROCHLORIDE 5 MG: 5 TABLET ORAL at 11:52

## 2022-05-04 RX ADMIN — SENNOSIDES AND DOCUSATE SODIUM 1 TABLET: 8.6; 5 TABLET ORAL at 08:09

## 2022-05-04 RX ADMIN — ASPIRIN 81 MG CHEWABLE TABLET 81 MG: 81 TABLET CHEWABLE at 08:09

## 2022-05-04 RX ADMIN — OXYCODONE HYDROCHLORIDE 5 MG: 5 TABLET ORAL at 19:06

## 2022-05-04 RX ADMIN — ACETAMINOPHEN 1000 MG: 500 TABLET ORAL at 09:57

## 2022-05-04 RX ADMIN — CEPHALEXIN 500 MG: 500 CAPSULE ORAL at 03:54

## 2022-05-04 RX ADMIN — CEPHALEXIN 500 MG: 500 CAPSULE ORAL at 09:57

## 2022-05-04 RX ADMIN — ONDANSETRON 4 MG: 2 INJECTION INTRAMUSCULAR; INTRAVENOUS at 18:36

## 2022-05-04 ASSESSMENT — ACTIVITIES OF DAILY LIVING (ADL)
ADLS_ACUITY_SCORE: 11
ADLS_ACUITY_SCORE: 12
ADLS_ACUITY_SCORE: 11
ADLS_ACUITY_SCORE: 9
ADLS_ACUITY_SCORE: 11
ADLS_ACUITY_SCORE: 10
ADLS_ACUITY_SCORE: 11
ADLS_ACUITY_SCORE: 9
ADLS_ACUITY_SCORE: 9
ADLS_ACUITY_SCORE: 11
ADLS_ACUITY_SCORE: 9
ADLS_ACUITY_SCORE: 11
ADLS_ACUITY_SCORE: 10
ADLS_ACUITY_SCORE: 11
ADLS_ACUITY_SCORE: 11
ADLS_ACUITY_SCORE: 12
ADLS_ACUITY_SCORE: 12

## 2022-05-04 NOTE — PROGRESS NOTES
"CLINICAL NUTRITION SERVICES - ASSESSMENT NOTE     Nutrition Prescription    RECOMMENDATIONS FOR MDs/PROVIDERS TO ORDER:  None at this time     Malnutrition Status:    Patient does not meet two of the established criteria necessary for diagnosing malnutrition but is at risk for malnutrition    Recommendations already ordered by Registered Dietitian (RD):  PRN supplements, gave pt supplement menu list.  Sent pt Ensure Enlives (strawberry and vanilla flavors), Beneprotein, and a Special K Bar for patient to try.     Future/Additional Recommendations:  Offer scheduled supplements and/or snacks if appetite remains poor.  Monitor weight trends.      REASON FOR ASSESSMENT  Reyna Martinez is a/an 31 year old female assessed by the dietitian for LOS and RN verbal request    NUTRITION HISTORY  Patient reports eating well/at baseline on a regular diet PTA.  Denies any known food allergies or intolerances.  Per chart, pt is POD #6 s/p R foot wound debridement, R great toe extensor, L free radial forearm flap recon.    CURRENT NUTRITION ORDERS  Diet: Regular  Intake/Tolerance: Pt reports upset stomach the past couple of days that have been limiting appetite, pt reports she thinks it'ss likely in part due to not being used to taking so many medications that she's now on post-op. Pt ate 2 meals yesterday, but didn't feel well afterwards.  Today mostly eating toast.  Pt interested in trying nutrition/protein drinks to help increase PO intake.  Has been on regular diet since 4/30, was on clear liquids 4/29, and NPO 4/28. Eating 25-75% of meals documented in flowsheets the past week. Noted to be tolerating diet well on 5/2 per RN notes.    LABS  Labs reviewed    MEDICATIONS  Medications reviewed    ANTHROPOMETRICS  Height: 172.7 cm (5' 8\")  Most Recent Weight: 65.3 kg (143 lb 15.4 oz)    IBW: 63.6 kg   BMI: Normal BMI  Weight History: 5 lb wt loss over the past 2 months (3%) wt loss, not clinically significant  Wt Readings from " Last 10 Encounters:   04/28/22 65.3 kg (143 lb 15.4 oz)   04/15/22 66 kg (145 lb 8.1 oz)   02/28/22 67.2 kg (148 lb 2.4 oz)   02/25/22 67.2 kg (148 lb 3.2 oz)   01/17/22 68.5 kg (151 lb)   06/17/21 64 kg (141 lb)   11/30/20 63.5 kg (140 lb)   12/30/19 64 kg (141 lb 1.5 oz)   12/06/19 61.8 kg (136 lb 3.9 oz)   08/01/19 64.2 kg (141 lb 8 oz)      Dosing Weight: 65 kg (actual)    ASSESSED NUTRITION NEEDS  Estimated Energy Needs: 1080-0743 kcals/day (25 - 30 kcals/kg)  Justification: Maintenance  Estimated Protein Needs: 78-95 grams protein/day (1.2 - 1.5 grams of pro/kg)  Justification: Post-op  Estimated Fluid Needs: (1 mL/kcal)   Justification: Maintenance and Per provider pending fluid status    PHYSICAL FINDINGS  See malnutrition section below.    MALNUTRITION  % Intake: Suspect decreased intake does not meet criteria  % Weight Loss: Weight loss does not meet criteria  Subcutaneous Fat Loss: None observed  Muscle Loss: None observed  Fluid Accumulation/Edema: None noted per chart review  Malnutrition Diagnosis: Patient does not meet two of the established criteria necessary for diagnosing malnutrition but is at risk for malnutrition    NUTRITION DIAGNOSIS  Inadequate oral intake related to decreased appetite/stomach upset as evidenced by minimal PO intake the past 2 days    INTERVENTIONS  Implementation  Nutrition Education: Provided education on nutrition supplements available and provided list.  Discussed that these are not meant to be sole source of nutrition but rather to supplement  Medical food supplement therapy     Goals  Patient to consume % of nutritionally adequate meal trays TID, or the equivalent with supplements/snacks.     Monitoring/Evaluation  Progress toward goals will be monitored and evaluated per protocol.     Shreya Gracia, RD, , LD  Weekday Pager: [phone]  Weekday Units covered: 7C (all beds) and 5A (beds 5201 through 5211-2)  Weekend/Holiday RD Pager: [phone]

## 2022-05-04 NOTE — PLAN OF CARE
Goal Outcome Evaluation:    POD #6 s/p R foot wound debridement, R great toe extensor, L free radial forearm flap recon. OVSS on RA. Pain managed with scheduled Tylenol this shift. Denies nausea and SOB. Flap checks q 4 hrs, no change. RLE elevated at all times, Vioptix readings remain stable. Bedrest with commode privileges. PIV is SL. Continue POC.

## 2022-05-04 NOTE — PLAN OF CARE
Time: 2652-1189    Activity: bedrest, commode privileges w/ Ax1    Neuro:  A&Ox4, calm and cooperative    GI/:  Pt reports passing gas and had 1 loose mucous stool this shift. Voiding spontaneously with no difficulties.     Diet:  Reg, tolerating well    Incisions/Drains: R lower PIV- SL. RLE flap which is warm, pink, w/ + doppler, bruises noted. LUE flap- UTV d/t splint.     Vitals: AVSS    Pain: Reports pain in abdomen, managed w/ PRN Oxycodone x1, scheduled Tylenol and Gabapentin    New changes this shift: Dangled RLE for 15 minutes per order at 1900, tolerated well w/ good perfusion, color, and cap refill.    Plan: Cont to dangle RLE per order. Cont POC.

## 2022-05-04 NOTE — PROGRESS NOTES
"Plastic Surgery Progress Note  05/04/22    S: NAEON. Pain well controlled. R foot elevated at all times. 15 minute dangles x3 yesterday went well. Had multiple bowel movements with relief of abdominal discomfort.     O: /60 (BP Location: Right arm)   Pulse 70   Temp 98.4  F (36.9  C) (Temporal)   Resp 16   Ht 1.727 m (5' 8\")   Wt 65.3 kg (143 lb 15.4 oz)   LMP 04/21/2022 (Exact Date)   SpO2 97%   BMI 21.89 kg/m    NAD, NLB, Reg rate  R foot with splint in place, elevated on pillows, flap well perfused, pink, 2 cap refill, strong doppler. Incisions c/d/i  LUE in splint, digits well perfused, cap refill 2 sec, able to move all digits, slightly swollen     Vioptix - 80%    A/p: 31F POD6 R great toe recon w/ L free RFF with R great toe extensor recon (4/28)     - Strict R foot elevation, needs to be parallel at all times. Will discuss dangling protocol with staff. Ancipitate 30 minute dangles today    - Flap checks q4h, vioptix   - C/w multimodal pain control    - Asa and lovenox   - Regular diet, bowel meds   - Will discuss LUE splint takedown today  - RLE posterior slab placement by OT today     Todd Johnson MD   Plastic & Reconstructive Surgery PGY-2   "

## 2022-05-04 NOTE — PROGRESS NOTES
"PRS     No issues. Tolerated 15 min TID dangling yesterday with no issues.     /66 (BP Location: Right arm)   Pulse 70   Temp 98.8  F (37.1  C) (Temporal)   Resp 18   Ht 1.727 m (5' 8\")   Wt 65.3 kg (143 lb 15.4 oz)   LMP 04/21/2022 (Exact Date)   SpO2 97%   BMI 21.89 kg/m    Not distressed  Non-labored breathing  R foot flap is viable, 2.5 s cap refill, soft, warm, less swelling, strong several (3) venous and triphasic arterial pencil Doppler signals, stable Vioptix readings in low 80s with signal quality >90. Left hand slightly swollen, improved, but well-perfused with normal sensation in all digits      since midnight     A/P: 31F POD #6 s/p R foot wound debridement, R great toe extensor, L free radial forearm flap recon, doing well     -Strict R foot elevation at all times.  Patient understands the importance of this and is agreeable.   -Started R foot dangle today. Tolerated 30 minutes with no issues. Will perform another 30 minute dangle with residents at around 3-4p and again this evening with nursing at 9-10p.      -Continue flap checks Q4H. Continue Vioptix  -Enhanced recovery pain protocol: Tylenol. Gabapentin. Oxycodone. Meloxicam. Valium.   -Continue ASA 81 daily  -Continue DVT chemoprophylaxis  -Regular diet, all IVF stopped. Encouraged PO intake.  -IV Ancef or PO Keflex  -Left forearm splint will be taken down on POD 6-7     Thom Adames MD, PhD  "

## 2022-05-04 NOTE — PLAN OF CARE
Right foot flap site with positive doppler checks q4 hours, warm to touch. Tolerating 30 minutes of right foot dangle at bedside with MD present, will have two more foot dangles today. Up with assist to commode, Tylenol/Oxycodone for right foot pain and left arm flap discomfort. Splint put on right foot today by OT, left arm casted.Poor appetite,feels bloated, passing gas and amount of stool, voiding spont.

## 2022-05-05 ENCOUNTER — APPOINTMENT (OUTPATIENT)
Dept: PHYSICAL THERAPY | Facility: CLINIC | Age: 32
DRG: 465 | End: 2022-05-05
Attending: STUDENT IN AN ORGANIZED HEALTH CARE EDUCATION/TRAINING PROGRAM
Payer: COMMERCIAL

## 2022-05-05 ENCOUNTER — APPOINTMENT (OUTPATIENT)
Dept: OCCUPATIONAL THERAPY | Facility: CLINIC | Age: 32
DRG: 465 | End: 2022-05-05
Attending: STUDENT IN AN ORGANIZED HEALTH CARE EDUCATION/TRAINING PROGRAM
Payer: COMMERCIAL

## 2022-05-05 PROCEDURE — 120N000002 HC R&B MED SURG/OB UMMC

## 2022-05-05 PROCEDURE — 250N000013 HC RX MED GY IP 250 OP 250 PS 637: Performed by: STUDENT IN AN ORGANIZED HEALTH CARE EDUCATION/TRAINING PROGRAM

## 2022-05-05 PROCEDURE — 97763 ORTHC/PROSTC MGMT SBSQ ENC: CPT | Mod: GO | Performed by: OCCUPATIONAL THERAPIST

## 2022-05-05 PROCEDURE — 97530 THERAPEUTIC ACTIVITIES: CPT | Mod: GP

## 2022-05-05 PROCEDURE — 97110 THERAPEUTIC EXERCISES: CPT | Mod: GP

## 2022-05-05 PROCEDURE — 250N000011 HC RX IP 250 OP 636: Performed by: STUDENT IN AN ORGANIZED HEALTH CARE EDUCATION/TRAINING PROGRAM

## 2022-05-05 RX ADMIN — ENOXAPARIN SODIUM 40 MG: 40 INJECTION SUBCUTANEOUS at 07:44

## 2022-05-05 RX ADMIN — OXYCODONE HYDROCHLORIDE 5 MG: 5 TABLET ORAL at 04:07

## 2022-05-05 RX ADMIN — ACETAMINOPHEN 1000 MG: 500 TABLET ORAL at 12:06

## 2022-05-05 RX ADMIN — GABAPENTIN 300 MG: 300 CAPSULE ORAL at 19:16

## 2022-05-05 RX ADMIN — MELOXICAM 7.5 MG: 7.5 TABLET ORAL at 19:16

## 2022-05-05 RX ADMIN — GABAPENTIN 300 MG: 300 CAPSULE ORAL at 14:25

## 2022-05-05 RX ADMIN — GABAPENTIN 300 MG: 300 CAPSULE ORAL at 07:44

## 2022-05-05 RX ADMIN — CEPHALEXIN 500 MG: 500 CAPSULE ORAL at 04:06

## 2022-05-05 RX ADMIN — ACETAMINOPHEN 1000 MG: 500 TABLET ORAL at 23:17

## 2022-05-05 RX ADMIN — CEPHALEXIN 500 MG: 500 CAPSULE ORAL at 10:31

## 2022-05-05 RX ADMIN — ACETAMINOPHEN 1000 MG: 500 TABLET ORAL at 06:45

## 2022-05-05 RX ADMIN — ASPIRIN 81 MG CHEWABLE TABLET 81 MG: 81 TABLET CHEWABLE at 07:44

## 2022-05-05 RX ADMIN — ACETAMINOPHEN 1000 MG: 500 TABLET ORAL at 00:00

## 2022-05-05 RX ADMIN — CEPHALEXIN 500 MG: 500 CAPSULE ORAL at 15:44

## 2022-05-05 RX ADMIN — CEPHALEXIN 500 MG: 500 CAPSULE ORAL at 22:53

## 2022-05-05 RX ADMIN — ACETAMINOPHEN 1000 MG: 500 TABLET ORAL at 17:40

## 2022-05-05 ASSESSMENT — ACTIVITIES OF DAILY LIVING (ADL)
ADLS_ACUITY_SCORE: 9

## 2022-05-05 NOTE — PLAN OF CARE
Pt vss and A&Ox4. On strict bedrest with bedside commode privileges- SBA with transfers to commode. Denies nausea and tolerating regular diet. RLE continuously elevated- pt dangled for 45 minutes x2 this shift with plastic surgery team and tolerated well- pt to dangle with bedside RN 1x this evening. RLE flap checks q4h. Site is soft, warm, pink, with +doppler- brace in place. MIGUEL LUE due to ACE bandage- LUE is also continuously elevated. Pain controlled with PO Tylenol and PO Gabapentin. Voiding spontaneously, passing gas, but no BM this shift. R PIV is saline locked. Will continue plan of care.

## 2022-05-05 NOTE — PLAN OF CARE
"/56 (BP Location: Right arm)   Pulse 77   Temp 97.7  F (36.5  C) (Temporal)   Resp 16   Ht 1.727 m (5' 8\")   Wt 65.3 kg (143 lb 15.4 oz)   LMP 04/21/2022 (Exact Date)   SpO2 97%   BMI 21.89 kg/m      Plan of Care Reviewed With: patient     Overall patient progress:no change    Time: 1337-5123  Status:POD #6 s/p R foot wound debridement, R great toe extensor, L free radial forearm flap recon.  Neuro/Pain:  A&Ox4, c/o L forearm, abd, and R foot pain. Oxycodone 5 mg prn once and scheduled Tylenol 1000 mg, Mobic, and Gabapentin.     Activity: assist one person,  assisted to bedside commode. NWB RLE  Cardiac/vs: NWB on RLE, activity restriction, RLE elevated at all time, dangled twice at 1600 and 2200 for 30 minutes, tolerated well, doppler q 4h. afebrile  Respiratory: room air, LS clear, denied SOB or cough  GI/: no bm this shift, active bowel sound. Voiding spontaneously without difficult  Diet: regular, c/o nausea, Zofran iv once   Skin: bruise to R foot, surgical incision on R foot.   LDAs: PIV, saline locked.   Labs/Imaging: reviewed   Change this shift: none   Plan: continue pain management, doppler q 4h on R foot.         "

## 2022-05-05 NOTE — PLAN OF CARE
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 Voided x2 on eves   Diet: regular   Incisions/Drains: Right foot flap, LUE in ACE brace  IV Access: PIV  Labs: Platelet count to be drawn this AM  Vitals: VSS, viotpix of foot flap WDL and stable   Pain: controled with PRN oxy 5mg and scheduled tylenol   New changes this shift: No changed   Plan: Plan for LUE ACE cast removal and dangle today

## 2022-05-05 NOTE — PROGRESS NOTES
"Plastic Surgery Progress Note  05/05/22    S: JEEVAN. Pain well controlled. R foot elevated at all times. Tolerated 30 min dangles. Tolerating diet. Voiding independently     O: BP 97/56 (BP Location: Right arm)   Pulse 71   Temp 97.2  F (36.2  C) (Temporal)   Resp 17   Ht 1.727 m (5' 8\")   Wt 65.3 kg (143 lb 15.4 oz)   LMP 04/21/2022 (Exact Date)   SpO2 98%   BMI 21.89 kg/m    NAD, NLB, Reg rate  R foot with splint in place, elevated on pillows, flap well perfused, pink, 2 cap refill, strong doppler. Incisions c/d/i  LUE in splint, digits well perfused, cap refill 2 sec, able to move all digits, slightly swollen. Splint removed this AM      Vioptix - 80%    UOP 1300//800    A/p: 31F POD7 R great toe recon w/ L free RFF with R great toe extensor recon (4/28)     - Strict R foot elevation, needs to be parallel at all times. 45 min dangles at 1300 and 2000 today     - Flap checks q4h, vioptix   - C/w multimodal pain control    - Asa and lovenox   - Regular diet, bowel meds   - LUE splint taken down today, dressed with adaptic/gauze/ace wrap.   - RLE posterior slab placement by OT today  - Anticipate discharge home tomorrow      Todd Johnson MD   Plastic & Reconstructive Surgery PGY-2   "

## 2022-05-06 ENCOUNTER — APPOINTMENT (OUTPATIENT)
Dept: PHYSICAL THERAPY | Facility: CLINIC | Age: 32
DRG: 465 | End: 2022-05-06
Attending: STUDENT IN AN ORGANIZED HEALTH CARE EDUCATION/TRAINING PROGRAM
Payer: COMMERCIAL

## 2022-05-06 ENCOUNTER — APPOINTMENT (OUTPATIENT)
Dept: OCCUPATIONAL THERAPY | Facility: CLINIC | Age: 32
DRG: 465 | End: 2022-05-06
Attending: STUDENT IN AN ORGANIZED HEALTH CARE EDUCATION/TRAINING PROGRAM
Payer: COMMERCIAL

## 2022-05-06 VITALS
WEIGHT: 143.96 LBS | TEMPERATURE: 98.3 F | RESPIRATION RATE: 18 BRPM | HEIGHT: 68 IN | OXYGEN SATURATION: 97 % | SYSTOLIC BLOOD PRESSURE: 101 MMHG | BODY MASS INDEX: 21.82 KG/M2 | HEART RATE: 66 BPM | DIASTOLIC BLOOD PRESSURE: 60 MMHG

## 2022-05-06 PROCEDURE — 250N000013 HC RX MED GY IP 250 OP 250 PS 637: Performed by: STUDENT IN AN ORGANIZED HEALTH CARE EDUCATION/TRAINING PROGRAM

## 2022-05-06 PROCEDURE — 250N000011 HC RX IP 250 OP 636: Performed by: STUDENT IN AN ORGANIZED HEALTH CARE EDUCATION/TRAINING PROGRAM

## 2022-05-06 PROCEDURE — 97535 SELF CARE MNGMENT TRAINING: CPT | Mod: GO

## 2022-05-06 PROCEDURE — 97530 THERAPEUTIC ACTIVITIES: CPT | Mod: GP

## 2022-05-06 RX ORDER — GABAPENTIN 300 MG/1
300 CAPSULE ORAL 3 TIMES DAILY
Qty: 90 CAPSULE | Refills: 0 | Status: SHIPPED | OUTPATIENT
Start: 2022-05-06 | End: 2022-06-05

## 2022-05-06 RX ORDER — POLYETHYLENE GLYCOL 3350 17 G/17G
17 POWDER, FOR SOLUTION ORAL DAILY
Qty: 510 G | COMMUNITY
Start: 2022-05-06

## 2022-05-06 RX ORDER — ASPIRIN 81 MG/1
81 TABLET, CHEWABLE ORAL DAILY
Qty: 30 TABLET | Refills: 0 | Status: SHIPPED | OUTPATIENT
Start: 2022-05-06 | End: 2022-05-06

## 2022-05-06 RX ORDER — ONDANSETRON 4 MG/1
4 TABLET, ORALLY DISINTEGRATING ORAL EVERY 6 HOURS PRN
Qty: 20 TABLET | Refills: 0 | Status: SHIPPED | OUTPATIENT
Start: 2022-05-06

## 2022-05-06 RX ORDER — ASPIRIN 81 MG/1
81 TABLET, CHEWABLE ORAL DAILY
Qty: 21 TABLET | Refills: 0 | Status: SHIPPED | OUTPATIENT
Start: 2022-05-06

## 2022-05-06 RX ORDER — OXYCODONE HYDROCHLORIDE 5 MG/1
5 TABLET ORAL EVERY 6 HOURS PRN
Qty: 10 TABLET | Refills: 0 | Status: SHIPPED | OUTPATIENT
Start: 2022-05-06

## 2022-05-06 RX ORDER — DIAZEPAM 2 MG
2 TABLET ORAL EVERY 8 HOURS PRN
Qty: 12 TABLET | Refills: 0 | Status: SHIPPED | OUTPATIENT
Start: 2022-05-06

## 2022-05-06 RX ORDER — HYDROXYZINE HYDROCHLORIDE 25 MG/1
25 TABLET, FILM COATED ORAL EVERY 6 HOURS PRN
Qty: 30 TABLET | Refills: 0 | Status: SHIPPED | OUTPATIENT
Start: 2022-05-06

## 2022-05-06 RX ADMIN — GABAPENTIN 300 MG: 300 CAPSULE ORAL at 08:23

## 2022-05-06 RX ADMIN — ASPIRIN 81 MG CHEWABLE TABLET 81 MG: 81 TABLET CHEWABLE at 08:23

## 2022-05-06 RX ADMIN — ENOXAPARIN SODIUM 40 MG: 40 INJECTION SUBCUTANEOUS at 08:22

## 2022-05-06 RX ADMIN — ACETAMINOPHEN 1000 MG: 500 TABLET ORAL at 05:56

## 2022-05-06 RX ADMIN — CEPHALEXIN 500 MG: 500 CAPSULE ORAL at 03:51

## 2022-05-06 ASSESSMENT — ACTIVITIES OF DAILY LIVING (ADL)
ADLS_ACUITY_SCORE: 9

## 2022-05-06 NOTE — PLAN OF CARE
Physical Therapy Discharge Summary    Reason for therapy discharge:    Discharged to home with outpatient therapy.    Progress towards therapy goal(s). See goals on Care Plan in [facility] electronic health record for goal details.  Goals partially met.  Barriers to achieving goals:   limited tolerance for therapy and discharge from facility.    Therapy recommendation(s):    Continued therapy is recommended.  Rationale/Recommendations:  progression of safe mobility within activity restrictions/parameters. Pt and spouse declined any equipement needs, will use knee scooter for bathroom only, have crutches to A with transfers in bathroom if needed. Spouse and Mom are able to provide 24/7 assist. Unable to facilitate safe home set up while IP due to activity restrictions/parameters. .

## 2022-05-06 NOTE — DISCHARGE SUMMARY
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 Right dorsal foot irrigation and debridement using a sharp blade with additional deep and inferior skin margin excisions  2. Right extensor hallucis longus tendon reconstruction with use of right second toe extensor tendon graft  3. Right dorsal foot wound reconstruction with free skin flap taken from the left radial forearm, with additional complexity attributable to additional venous anastomoses as well as neurotization  4.  Primary neurorrhaphy between cut and of deep peroneal nerve and lateral antebrachial cutaneous nerve in the left forearm flap  5.  Placement of Integra bilaminar neodermis onto the left radial forearm flap donor site (4.5 cm x 3.5 cm)  6.  Placement of posterior splint onto the right leg  7.  Placement of volar wrist splint onto the left forearm        Non-operative procedures:   None            Brief History of Illness:   31-year-old female with history of recurrent right dorsal foot benign locally aggressive lesion consistent with dermatofibroma now status post wide local resection with loss of great toe extensor and exposed MTP joint capsule, presenting for reconstruction.         Hospital Course:   The patient underwent the procedure above and tolerated it well. She was admitted post operatively for flap monitoring. Dangling protocol was initiated on POD #5 at 15min TID and increased in 15 minute increments until 45 minutes was reached on POD#7. She tolerated protocol well and there were no concerns  with the flap viability while dangling clinically or with StO2 monitoring use the  Vioptix. On POD#7 the LUE splint was taken down with evidence of appropriate healing of the Integra neodermis. On POD#8, the day of discharge, she had return of bowel function, nausea controlled by antiemetics,  tolerating regular diet, voiding, and transferring/using the scooter or with the RLE elevated, and pain was controlled with PO pain medication. Patient was instructed on a strict dangle protocol while at home in addition to self monitoring of the flap using clinical signs such as color and capillary refill. Plan to follow up in clinic in one week for reevaluation and timing for staged STSG.     Physical Exam:    Temp:  [98.3  F (36.8  C)-99  F (37.2  C)] 98.3  F (36.8  C)  Pulse:  [66-72] 66  Resp:  [16-18] 18  BP: ()/(53-60) 101/60  SpO2:  [97 %-100 %] 97 %    Laying comfortably in bed, NAD  Non labored breathing on RA   Non tachycardic on pulse   R foot with OT posterior slab splint in place, elevated on pillows, flap well perfused, pink, 2 cap refill, strong triphasic doppler signals. Incisions c/d/i  LUE dressing c/d/i, digits well perfused, cap refill 2 sec, able to move all digits.    Vioptix - 80%         Consultations:   PHYSICAL THERAPY ADULT IP CONSULT  OCCUPATIONAL THERAPY ADULT IP CONSULT  OCCUPATIONAL THERAPY ADULT IP CONSULT  ORTHOSIS EXTREMITY LOWER REFERRAL IP CONSULT           Imaging Studies:   [unfilled]         Medications Prior to Admission:     Medications Prior to Admission   Medication Sig Dispense Refill Last Dose     naproxen (NAPROSYN) 500 MG tablet Take 500 mg by mouth 2 times daily (with meals)   More than a month at Unknown time     rizatriptan (MAXALT) 5 MG tablet Take 5 mg by mouth at onset of headache   More than a month at Unknown time     acetaminophen (TYLENOL) 500 MG tablet Take 1,000 mg by mouth every 6 hours as needed for mild pain        Misc. Devices (ROLLER WALKER) MISC 1 Device  as needed (For ambulation while avoiding weight bearing through right toes) 1 each 0      [DISCONTINUED] hydrOXYzine (VISTARIL) 25 MG capsule Take 1 capsule (25 mg) by mouth every 6 hours as needed for itching 30 capsule 0      [DISCONTINUED] oxyCODONE (ROXICODONE) 5 MG tablet Take 1-2 tablets (5-10 mg) by mouth every 4 hours as needed for moderate to severe pain 12 tablet 0 Past Week at Unknown time            Discharge Medications:     Current Discharge Medication List      START taking these medications    Details   aspirin (ASA) 81 MG chewable tablet Take 1 tablet (81 mg) by mouth daily  Qty: 21 tablet, Refills: 0    Associated Diagnoses: 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      diazepam (VALIUM) 2 MG tablet Take 1 tablet (2 mg) by mouth every 8 hours as needed for anxiety  Qty: 12 tablet, Refills: 0    Associated Diagnoses: 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      gabapentin (NEURONTIN) 300 MG capsule Take 1 capsule (300 mg) by mouth 3 times daily  Qty: 90 capsule, Refills: 0    Associated Diagnoses: 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      hydrOXYzine (ATARAX) 25 MG tablet Take 1 tablet (25 mg) by mouth every 6 hours as needed for itching  Qty: 30 tablet, Refills: 0    Associated Diagnoses: 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      ondansetron (ZOFRAN ODT) 4 MG ODT tab Take 1 tablet (4 mg) by mouth every 6 hours as needed for nausea or vomiting  Qty: 20 tablet, Refills: 0    Associated Diagnoses: 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      polyethylene glycol (MIRALAX) 17 GM/Dose powder Take 17 g by mouth daily  Qty: 510 g    Associated Diagnoses: 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         CONTINUE these medications which have CHANGED    Details   oxyCODONE (ROXICODONE) 5 MG tablet Take 1 tablet (5 mg) by mouth every 6 hours as needed for moderate to severe pain  Qty: 10 tablet, Refills: 0    Associated Diagnoses: Benign  neoplasm of connective tissue of right foot         CONTINUE these medications which have NOT CHANGED    Details   naproxen (NAPROSYN) 500 MG tablet Take 500 mg by mouth 2 times daily (with meals)      rizatriptan (MAXALT) 5 MG tablet Take 5 mg by mouth at onset of headache      acetaminophen (TYLENOL) 500 MG tablet Take 1,000 mg by mouth every 6 hours as needed for mild pain      Misc. 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         STOP taking these medications       hydrOXYzine (VISTARIL) 25 MG capsule Comments:   Reason for Stopping:                     Medications Discontinued or Adjusted During This Hospitalization:   New narcotics initiated: Oxycodone          Antibiotics Prescribed at Discharge:   No new antibiotics prescribed         Final Pathology Result:   Intra operative specimens negative for neoplasm         Discharge Instructions and Follow-Up:   Discharge diet: Regular   Discharge activity: Specific dangle protocol, please refer to activity discharge instructions    Discharge follow-up: Follow up with Dr. Adames in one week   Tubes/Lines/Drains: None   Wound care: Daily dressing changes to LUE with adaptic, bacitracin, 4x4 gauze, and ace           Home Health Care:   Not needed           Discharge Disposition:   Discharged to home      Condition at discharge: Stable    Seen and discussed with Dr. Zacarias Johnson MD   Plastic & Reconstructive Surgery PGY-2

## 2022-05-06 NOTE — PLAN OF CARE
Time: [id number]  Reason for Admission: Benign neoplasm of the right foot connective tissue   Activity: Non weight bearing of right foot, OOB x1 this shift to commode  Neuro: AOx4  GI/: Voided x1. No BM this shift   Diet: regular   Incisions/Drains: Right foot flap, LUE in ACE wrap donor site  IV Access: PIV  Labs: Platelet count to be drawn this AM  Vitals: VSS, viotpix of foot flap WDL and stable   Pain: controled with scheduled tylenol   New changes this shift: No changes  Plan: Plan top discharge today. Will continue with plan of care.

## 2022-05-06 NOTE — PLAN OF CARE
AOx4. VSS on ra. Up with assist x1; RLE at all times. Tolerating reg diet. Pain managed with re-postioning. PIV removed.  at bedside and supportive of cares. Went over discharge paperwork with pt; pt questions answered and pt agreeable with discharge plans. Voiding spontaneously. +flatus. LBM 5/6. Pt sent home with supplies for left arm wound; providers changed dressing this AM. Pt and  understand how to change dressing. Pt brought down to discharge pharmacy in wheel chair and  picked her up in vehicle to bring home.   Goal Outcome Evaluation: met

## 2022-05-06 NOTE — PLAN OF CARE
POD 7 right great toe reconstruction with flap. Flap checks Q4H. Flap warm and pink with good doppler sound. StO2 70-81%. Bedrest with commode privileges. RLE to stay parallel to ground at all times, elevated with brace on in bed. SBA with transfers. Dangled right foot off bed for 45 minutes per orders, patient tolerated well. LUE elevated with ACE wrap in place. Pain managed with scheduled Tylenol and Gabapentin. No nausea. Tolerating regular diet. Passing gas, BM x1. Voiding spontaneously. PIV SL.

## 2022-05-06 NOTE — PLAN OF CARE
Occupational Therapy Discharge Summary    Reason for therapy discharge:    Discharged to home.    Progress towards therapy goal(s). See [facility] on [facility] for goal details.  Goals partially met.  Barriers to achieving goals:   discharge from facility.    Therapy recommendation(s):    Continue home exercise program. Recommend A for I/ADLs prn due to precautions.

## 2022-05-06 NOTE — PROGRESS NOTES
PRS    No issues.  Tolerated 45 minutes 3 times daily dangle.  Not using narcotics.  Urinating with no issues.  Getting out of bed to the commode.    On exam, right foot flap is viable with 2-2.5-second capillary refill and stable, with excellent triphasic arterial pencil Doppler signal as well as loud venous Doppler signals.  Right foot flap Vioptix signals have been steady with signal quality greater than 90% with oxygen saturations in the high 70s to low 80s.  Left hand swelling greatly improved and patient able to make a full left composite fist with no finger stiffness.  The Integra is incorporating.     mL  Plt 250    A/P: 31-year-old female 8 days status post right dorsal foot irrigation and debridement, great toe extensor reconstruction with tendon graft, free radial forearm flap taken from the left upper extremity    -Patient can discharge today.  Instructed patient on dangle protocol, found in a letter that was printed for the patient.  Patient will increase allowable duration of dangle by approximately 10 minutes three  times daily for the next week.  Instructed patient to continue observing the flap at home, and if there are any changes that are unusual to the skin color or capillary refill of the flap during the dangles, patient should be conservative and elevate the right foot - even if that means prematurely ending the dangle.  Patient understands and is agreeable.  -Right foot to be strictly elevated, unless dangling per protocol.  Today patient will repeat 45-minute right foot dangle three times, to be spaced out by 6-7 hours between dangles.  The right foot should be dangling as has been done in the hospital, with the right foot slightly below the level of the heart off the side of the bed.  -Patient was instructed to dorsiflex the ankle to prevent stiffness, equinus deformity, and to allow for blood to circulate through the calf to prevent blood clots.  While extremely dorsiflexing the  right ankle, the flap skin paddle capillary refill, Doppler signals, and the Vioptix readings did not change.  -Instructed patient to continue right foot nonweightbearing, with the exception of an occasional toe-touch as needed during ambulation.  Patient was reminded that there is a fresh right great toe extensor tendon reconstruction, so pressure on the great toe should be limited.  -The left forearm should be managed with daily Adaptic and either bacitracin or Aquaphor dressing changes, with 4 x 4 gauze padding, and a gentle Ace wrap bandage.  Discussed staged skin grafting sometime in the next 2 to 3 weeks.  -Continue aspirin 81 for the next 3 weeks  -Pain control can be done with Tylenol and ibuprofen, alternating for more optimal pain relief.  We will also prescribe oxycodone 5 mg every 6 hours as needed for breakthrough pain.  -Patient can ambulate using the scooter for short periods of time, but otherwise the right foot should remain elevated unless dangling per protocol.  -Return to clinic in 1 week for reevaluation    Thom Adames MD, PhD

## 2022-05-13 ENCOUNTER — OFFICE VISIT (OUTPATIENT)
Dept: SURGERY | Facility: CLINIC | Age: 32
End: 2022-05-13
Payer: COMMERCIAL

## 2022-05-13 DIAGNOSIS — R22.41 FOOT MASS, RIGHT: Primary | ICD-10-CM

## 2022-05-13 PROCEDURE — 99024 POSTOP FOLLOW-UP VISIT: CPT | Performed by: STUDENT IN AN ORGANIZED HEALTH CARE EDUCATION/TRAINING PROGRAM

## 2022-05-13 NOTE — PROGRESS NOTES
PRS    Doing well. No issues. Dangling 2 hours three times per day. Has not gotten the foot wet yet. Daily left forearm dressing changes.    On exam, the right foot flap viable with 2.5 sec cap refill, incisions intact with no signs of infection. Left volar forearm Integra well-incorporating with incision intact.     Path: Negative deep and inferior margins    A/P: 31F 2w 1d s/p R great toe extensor, dorsal foot wound recon with L RFFF, L forearm Integra placement, doing well    -Continue progressive dangle protocol (provided in letter)  -Continue right foot elevation. When sleeping, can use pillows rather than the Robert foam pillow. Once at 6 hours of constant dangle (next week), can sleep with the foot level with the heart.   -Continue ASA 81 for a total of 4 weeks  -Instructed patient on how to initiate gentle great toe extension exercises  -Can start heel touch only weight-bearing on the right foot  -We will place case request for split thickness skin grafting from right thigh to the left forearm, to be scheduled in a few weeks  -Patient can get the right foot wet during showering, but no high-pressure shower head direct onto the foot and no soaking  -Photography today    Thom Adames MD, PhD

## 2022-05-13 NOTE — NURSING NOTE
Adaptic applied to donor site on left forearm. Area wrapped in Kerlix then ace bandage.    Salina Bolaños LPN

## 2022-05-13 NOTE — LETTER
5/13/2022         RE: Reyna Martinez  [address] [place] [id number]        Dear Colleague,    Thank you for referring your patient, Reyna Martinez, to the [facility]. Please see a copy of my visit note below.    PRS    Doing well. No issues. Dangling 2 hours three times per day. Has not gotten the foot wet yet. Daily left forearm dressing changes.    On exam, the right foot flap viable with 2.5 sec cap refill, incisions intact with no signs of infection. Left volar forearm Integra well-incorporating with incision intact.     Path: Negative deep and inferior margins    A/P: 31F 2w 1d s/p R great toe extensor, dorsal foot wound recon with L RFFF, L forearm Integra placement, doing well    -Continue progressive dangle protocol (provided in letter)  -Continue right foot elevation. When sleeping, can use pillows rather than the Robert foam pillow. Once at 6 hours of constant dangle (next week), can sleep with the foot level with the heart.   -Continue ASA 81 for a total of 4 weeks  -Instructed patient on how to initiate gentle great toe extension exercises  -Can start heel touch only weight-bearing on the right foot  -We will place case request for split thickness skin grafting from right thigh to the left forearm, to be scheduled in a few weeks  -Patient can get the right foot wet during showering, but no high-pressure shower head direct onto the foot and no soaking  -Photography today    Thom Adames MD, PhD      Again, thank you for allowing me to participate in the care of your patient.        Sincerely,        Thom Adames MD

## 2022-05-13 NOTE — NURSING NOTE
Reyna Martinez's goals for this visit include:   Chief Complaint   Patient presents with     Surgical Followup     Right foot excision        She requests these members of her care team be copied on today's visit information: no    PCP: Morelia Atkinson    Referring Provider:  Thom Adames MD  DEPARTMENT OF SURGERY  [address] [place] [id number]    [facility] 04/21/2022 (Exact Date)     Do you need any medication refills at today's visit? No    Salina Bolaños LPN

## 2022-05-13 NOTE — LETTER
[facility]  [address] [place] [id number]  [phone]          May 13, 2022    RE:  Reyna HILL Michelle                                                                                                                                                       [address] [address] [place] [id number]      Instructions:  Dangle protocol  5/13: 2 hours three times per day  5/14: 2 hours 15 mins three times per day  5/15: 2 hours 30 mins three times per day  5/16: 2 hours 45 mins three times per day  5/17: 3 hours three times per day  5/18: 3 hours 15 mins three times per day  5/19: 3 hours 30 mins three times per day  5/20: 3 hours 45 mins three times per day  5/21: 4 hours three times per day  5/22: 4 hours 30 mins three times per day  5/23: 5 hours three times per day  5/24: 5 hours 30 mins three times per day  5/25: 6 hours three times per day  5/26: 6 hours three times per day  5/27: 6 hours three times per day    You can start heel touch weight-bearing on the right foot  You can get the right foot wet, but no soaking. Allow the water and soap to run over the incisions.  You can start gently lifting the right great toe.  We will plan you next operation in 2-3 weeks.          Thom Adames MD, PhD  Staff Plastic Surgeon

## 2022-05-17 ENCOUNTER — TELEPHONE (OUTPATIENT)
Dept: PLASTIC SURGERY | Facility: CLINIC | Age: 32
End: 2022-05-17
Payer: COMMERCIAL

## 2022-05-17 PROBLEM — R22.40 MASS OF TOE: Status: ACTIVE | Noted: 2019-11-18

## 2022-05-17 NOTE — TELEPHONE ENCOUNTER
----- Message from Sarika Ramírez sent at 5/17/2022  6:55 PM CDT -----  [facility] patient so looping in the [facility] team!    Please let me know if you plan to update her H&P or if she needs another.    We had a cancellation on 5/23, so I was able to get her scheduled at the [facility].    I left her a voicemail letting her know that I am waiting to hear back about the H&P.    She will need a COVID test, and she did complete the last two locally, so I told her to let us know if we need to fax orders.    You requested a 1 week post-op, and she has a visit with you on 5/27 in [facility]. I will keep this as scheduled for now, but does this need to be moved?    Thanks all!    Sarika   ----- Message -----  From: Sarika Ramírez  Sent: 5/17/2022   5:20 PM CDT  To: SAURABH Diop, Thom Adames MD, #    Will you update her H&P or will she need another?    She had hers with her PCP on 4/12.    Sarika   ----- Message -----  From: Thom Adames MD  Sent: 5/13/2022  11:22 PM CDT  To: SAURABH Diop, Thom Adames MD, #    Need to schedule her for split thickness skin graft at [facility] (if they have a dermatome) sometime on or after week of 5/23  Thanks

## 2022-05-17 NOTE — TELEPHONE ENCOUNTER
RN Care Coordinator: Amie Mendez; [phone]    Surgery is scheduled with Dr. Adames on 5/23 at the [facility].  Scheduled per availability.    H&P to be completed by Primary Care Provider or Dr. Adames    COVID-19 test:   Will be at PCP office - patient to schedule and let clinic know if orders need to be faxed     Post-op:   5/27 with Dr. Adames    Patient will receive a phone call from pre-admission nurses 1-2 days prior to surgery with arrival and start time.    I left a detailed voicemail regarding the scheduled information and asked for a call back. Provided direct line. Will watch for call back from patient.

## 2022-05-18 ENCOUNTER — MYC MEDICAL ADVICE (OUTPATIENT)
Dept: SURGERY | Facility: AMBULATORY SURGERY CENTER | Age: 32
End: 2022-05-18
Payer: COMMERCIAL

## 2022-05-20 ENCOUNTER — OFFICE VISIT (OUTPATIENT)
Dept: SURGERY | Facility: CLINIC | Age: 32
End: 2022-05-20
Payer: COMMERCIAL

## 2022-05-20 ENCOUNTER — ANESTHESIA EVENT (OUTPATIENT)
Dept: SURGERY | Facility: AMBULATORY SURGERY CENTER | Age: 32
End: 2022-05-20
Payer: COMMERCIAL

## 2022-05-20 DIAGNOSIS — S81.802D WOUND OF LEFT LOWER EXTREMITY, SUBSEQUENT ENCOUNTER: Primary | ICD-10-CM

## 2022-05-20 PROCEDURE — 99024 POSTOP FOLLOW-UP VISIT: CPT | Performed by: STUDENT IN AN ORGANIZED HEALTH CARE EDUCATION/TRAINING PROGRAM

## 2022-05-20 RX ORDER — FENTANYL CITRATE 50 UG/ML
25 INJECTION, SOLUTION INTRAMUSCULAR; INTRAVENOUS
Status: CANCELLED | OUTPATIENT
Start: 2022-05-20

## 2022-05-20 NOTE — PROGRESS NOTES
PRS    No issues, except had some more swelling once reaching the 3 hour dangle with the third dangle.  Scheduled for skin graft next week.    On exam, right dorsal foot free flap viable with 3-second capillary refill and swelling continue to improve, some ecchymosis that is stable.  Left forearm Integra well incorporated and forearm incision intact with no signs of infection.    A/P: 32-year-old female 3 weeks status post right dorsal foot irrigation and debridement, left radial forearm free flap reconstruction, Integra placement to the left forearm donor site    -Scheduled for split-thickness skin graft to the left forearm Integra on Monday.  Given chlorhexidine wash.  -Updated dangle protocol:    5/20: 3 hours 15 mins three times per day   5/21: 3 hours 30 mins three times per day   5/22: 3 hours 45 mins three times per day   5/23: 4 hours three times per day   5/24: 4 hours three times per day   5/25: 4 hours three times per day   5/26: 4 hours three times per day   5/27: 4 hours three times per day     -Patient can continue daily aspirin 81  -Encourage patient to start scar treatment to the left forearm incision, but to avoid the Integra  -Discussed the risks of next week's surgery, including but not limited to: Infection, bleeding, pain, poor scarring, skin graft loss, need for revision surgery.  Despite these risks, patient consents to and would like to proceed with the planned surgery.    Thom Adames MD, PhD

## 2022-05-20 NOTE — NURSING NOTE
Reyna Martinez's goals for this visit include:   Chief Complaint   Patient presents with     RECHECK     Ace wrap teaching       She requests these members of her care team be copied on today's visit information: no    PCP: Morelia Atkinson    Referring Provider:  No referring provider defined for this encounter.    LMP 04/21/2022 (Exact Date)     Do you need any medication refills at today's visit? No    Salina Bolaños LPN

## 2022-05-20 NOTE — LETTER
5/20/2022         RE: Reyna Martinez  [address] [address] [place] [id number]        Dear Colleague,    Thank you for referring your patient, Reyna Martinez, to the [facility]. Please see a copy of my visit note below.    PRS    No issues, except had some more swelling once reaching the 3 hour dangle with the third dangle.  Scheduled for skin graft next week.    On exam, right dorsal foot free flap viable with 3-second capillary refill and swelling continue to improve, some ecchymosis that is stable.  Left forearm Integra well incorporated and forearm incision intact with no signs of infection.    A/P: 32-year-old female 3 weeks status post right dorsal foot irrigation and debridement, left radial forearm free flap reconstruction, Integra placement to the left forearm donor site    -Scheduled for split-thickness skin graft to the left forearm Integra on Monday.  Given chlorhexidine wash.  -Updated dangle protocol:    5/20: 3 hours 15 mins three times per day   5/21: 3 hours 30 mins three times per day   5/22: 3 hours 45 mins three times per day   5/23: 4 hours three times per day   5/24: 4 hours three times per day   5/25: 4 hours three times per day   5/26: 4 hours three times per day   5/27: 4 hours three times per day     -Patient can continue daily aspirin 81  -Encourage patient to start scar treatment to the left forearm incision, but to avoid the Integra  -Discussed the risks of next week's surgery, including but not limited to: Infection, bleeding, pain, poor scarring, skin graft loss, need for revision surgery.  Despite these risks, patient consents to and would like to proceed with the planned surgery.    Thom Adames MD, PhD        Again, thank you for allowing me to participate in the care of your patient.        Sincerely,        Thom Adames MD

## 2022-05-21 ENCOUNTER — HEALTH MAINTENANCE LETTER (OUTPATIENT)
Age: 32
End: 2022-05-21

## 2022-05-23 ENCOUNTER — HOSPITAL ENCOUNTER (OUTPATIENT)
Facility: AMBULATORY SURGERY CENTER | Age: 32
Discharge: HOME | End: 2022-05-23
Attending: STUDENT IN AN ORGANIZED HEALTH CARE EDUCATION/TRAINING PROGRAM
Payer: COMMERCIAL

## 2022-05-23 ENCOUNTER — ANESTHESIA (OUTPATIENT)
Dept: SURGERY | Facility: AMBULATORY SURGERY CENTER | Age: 32
End: 2022-05-23
Payer: COMMERCIAL

## 2022-05-23 VITALS
BODY MASS INDEX: 21.98 KG/M2 | TEMPERATURE: 97.9 F | HEIGHT: 68 IN | OXYGEN SATURATION: 100 % | WEIGHT: 145 LBS | RESPIRATION RATE: 18 BRPM | HEART RATE: 79 BPM | SYSTOLIC BLOOD PRESSURE: 98 MMHG | DIASTOLIC BLOOD PRESSURE: 64 MMHG

## 2022-05-23 DIAGNOSIS — D21.21: Primary | ICD-10-CM

## 2022-05-23 LAB — B-HCG SERPL-ACNC: <1 IU/L (ref 0–5)

## 2022-05-23 PROCEDURE — 15100 SPLT AGRFT T/A/L 1ST 100SQCM: CPT | Mod: 58 | Performed by: STUDENT IN AN ORGANIZED HEALTH CARE EDUCATION/TRAINING PROGRAM

## 2022-05-23 PROCEDURE — 84702 CHORIONIC GONADOTROPIN TEST: CPT | Performed by: PATHOLOGY

## 2022-05-23 PROCEDURE — 15100 SPLT AGRFT T/A/L 1ST 100SQCM: CPT | Mod: 58

## 2022-05-23 RX ORDER — FENTANYL CITRATE 50 UG/ML
25 INJECTION, SOLUTION INTRAMUSCULAR; INTRAVENOUS EVERY 5 MIN PRN
Status: DISCONTINUED | OUTPATIENT
Start: 2022-05-23 | End: 2022-05-25 | Stop reason: HOSPADM

## 2022-05-23 RX ORDER — OXYCODONE HYDROCHLORIDE 5 MG/1
5 TABLET ORAL EVERY 4 HOURS PRN
Status: DISCONTINUED | OUTPATIENT
Start: 2022-05-23 | End: 2022-05-25 | Stop reason: HOSPADM

## 2022-05-23 RX ORDER — SODIUM CHLORIDE, SODIUM LACTATE, POTASSIUM CHLORIDE, CALCIUM CHLORIDE 600; 310; 30; 20 MG/100ML; MG/100ML; MG/100ML; MG/100ML
INJECTION, SOLUTION INTRAVENOUS CONTINUOUS
Status: DISCONTINUED | OUTPATIENT
Start: 2022-05-23 | End: 2022-05-25 | Stop reason: HOSPADM

## 2022-05-23 RX ORDER — LIDOCAINE 40 MG/G
CREAM TOPICAL
Status: DISCONTINUED | OUTPATIENT
Start: 2022-05-23 | End: 2022-05-25 | Stop reason: HOSPADM

## 2022-05-23 RX ORDER — LIDOCAINE HYDROCHLORIDE 20 MG/ML
INJECTION, SOLUTION INFILTRATION; PERINEURAL PRN
Status: DISCONTINUED | OUTPATIENT
Start: 2022-05-23 | End: 2022-05-23

## 2022-05-23 RX ORDER — MEPERIDINE HYDROCHLORIDE 25 MG/ML
12.5 INJECTION INTRAMUSCULAR; INTRAVENOUS; SUBCUTANEOUS
Status: DISCONTINUED | OUTPATIENT
Start: 2022-05-23 | End: 2022-05-25 | Stop reason: HOSPADM

## 2022-05-23 RX ORDER — PROPOFOL 10 MG/ML
INJECTION, EMULSION INTRAVENOUS CONTINUOUS PRN
Status: DISCONTINUED | OUTPATIENT
Start: 2022-05-23 | End: 2022-05-23

## 2022-05-23 RX ORDER — DEXAMETHASONE SODIUM PHOSPHATE 4 MG/ML
INJECTION, SOLUTION INTRA-ARTICULAR; INTRALESIONAL; INTRAMUSCULAR; INTRAVENOUS; SOFT TISSUE PRN
Status: DISCONTINUED | OUTPATIENT
Start: 2022-05-23 | End: 2022-05-23

## 2022-05-23 RX ORDER — EPHEDRINE SULFATE 50 MG/ML
INJECTION, SOLUTION INTRAMUSCULAR; INTRAVENOUS; SUBCUTANEOUS PRN
Status: DISCONTINUED | OUTPATIENT
Start: 2022-05-23 | End: 2022-05-23

## 2022-05-23 RX ORDER — ONDANSETRON 2 MG/ML
4 INJECTION INTRAMUSCULAR; INTRAVENOUS EVERY 30 MIN PRN
Status: DISCONTINUED | OUTPATIENT
Start: 2022-05-23 | End: 2022-05-25 | Stop reason: HOSPADM

## 2022-05-23 RX ORDER — CEFAZOLIN SODIUM 2 G/50ML
2 SOLUTION INTRAVENOUS
Status: COMPLETED | OUTPATIENT
Start: 2022-05-23 | End: 2022-05-23

## 2022-05-23 RX ORDER — CEFAZOLIN SODIUM 2 G/50ML
2 SOLUTION INTRAVENOUS SEE ADMIN INSTRUCTIONS
Status: DISCONTINUED | OUTPATIENT
Start: 2022-05-23 | End: 2022-05-25 | Stop reason: HOSPADM

## 2022-05-23 RX ORDER — FENTANYL CITRATE 50 UG/ML
INJECTION, SOLUTION INTRAMUSCULAR; INTRAVENOUS PRN
Status: DISCONTINUED | OUTPATIENT
Start: 2022-05-23 | End: 2022-05-23

## 2022-05-23 RX ORDER — ONDANSETRON 4 MG/1
4 TABLET, ORALLY DISINTEGRATING ORAL EVERY 30 MIN PRN
Status: DISCONTINUED | OUTPATIENT
Start: 2022-05-23 | End: 2022-05-25 | Stop reason: HOSPADM

## 2022-05-23 RX ORDER — PROPOFOL 10 MG/ML
INJECTION, EMULSION INTRAVENOUS PRN
Status: DISCONTINUED | OUTPATIENT
Start: 2022-05-23 | End: 2022-05-23

## 2022-05-23 RX ORDER — ACETAMINOPHEN 325 MG/1
975 TABLET ORAL ONCE
Status: COMPLETED | OUTPATIENT
Start: 2022-05-23 | End: 2022-05-23

## 2022-05-23 RX ORDER — GABAPENTIN 300 MG/1
300 CAPSULE ORAL
Status: DISCONTINUED | OUTPATIENT
Start: 2022-05-23 | End: 2022-05-25 | Stop reason: HOSPADM

## 2022-05-23 RX ORDER — ONDANSETRON 2 MG/ML
INJECTION INTRAMUSCULAR; INTRAVENOUS PRN
Status: DISCONTINUED | OUTPATIENT
Start: 2022-05-23 | End: 2022-05-23

## 2022-05-23 RX ADMIN — DEXAMETHASONE SODIUM PHOSPHATE 4 MG: 4 INJECTION, SOLUTION INTRA-ARTICULAR; INTRALESIONAL; INTRAMUSCULAR; INTRAVENOUS; SOFT TISSUE at 09:59

## 2022-05-23 RX ADMIN — PROPOFOL 50 MG: 10 INJECTION, EMULSION INTRAVENOUS at 10:06

## 2022-05-23 RX ADMIN — Medication 100 MCG: at 10:12

## 2022-05-23 RX ADMIN — LIDOCAINE HYDROCHLORIDE 80 MG: 20 INJECTION, SOLUTION INFILTRATION; PERINEURAL at 09:59

## 2022-05-23 RX ADMIN — FENTANYL CITRATE 50 MCG: 50 INJECTION, SOLUTION INTRAMUSCULAR; INTRAVENOUS at 09:59

## 2022-05-23 RX ADMIN — SODIUM CHLORIDE, SODIUM LACTATE, POTASSIUM CHLORIDE, CALCIUM CHLORIDE: 600; 310; 30; 20 INJECTION, SOLUTION INTRAVENOUS at 09:11

## 2022-05-23 RX ADMIN — ONDANSETRON 4 MG: 2 INJECTION INTRAMUSCULAR; INTRAVENOUS at 09:59

## 2022-05-23 RX ADMIN — Medication 100 MCG: at 10:17

## 2022-05-23 RX ADMIN — EPHEDRINE SULFATE 10 MG: 50 INJECTION, SOLUTION INTRAMUSCULAR; INTRAVENOUS; SUBCUTANEOUS at 10:38

## 2022-05-23 RX ADMIN — PROPOFOL 100 MCG/KG/MIN: 10 INJECTION, EMULSION INTRAVENOUS at 11:08

## 2022-05-23 RX ADMIN — PROPOFOL 200 MG: 10 INJECTION, EMULSION INTRAVENOUS at 10:03

## 2022-05-23 RX ADMIN — FENTANYL CITRATE 50 MCG: 50 INJECTION, SOLUTION INTRAMUSCULAR; INTRAVENOUS at 10:24

## 2022-05-23 RX ADMIN — Medication 50 MCG: at 10:26

## 2022-05-23 RX ADMIN — EPHEDRINE SULFATE 5 MG: 50 INJECTION, SOLUTION INTRAMUSCULAR; INTRAVENOUS; SUBCUTANEOUS at 10:26

## 2022-05-23 RX ADMIN — PROPOFOL 200 MCG/KG/MIN: 10 INJECTION, EMULSION INTRAVENOUS at 10:03

## 2022-05-23 RX ADMIN — CEFAZOLIN SODIUM 2 G: 2 SOLUTION INTRAVENOUS at 09:59

## 2022-05-23 RX ADMIN — ACETAMINOPHEN 975 MG: 325 TABLET ORAL at 09:11

## 2022-05-23 RX ADMIN — OXYCODONE HYDROCHLORIDE 5 MG: 5 TABLET ORAL at 11:43

## 2022-05-23 NOTE — ANESTHESIA CARE TRANSFER NOTE
Patient: Reyna Martinez    Procedure: Procedure(s):  LEFT FOREARM WOUND RECONSTRUCTION WITH SPLIT-THICKNESS SKIN GRAFT TAKEN FROM THE RIGHT THIGH       Diagnosis: Foot mass, right [R22.41]  Diagnosis Additional Information: No value filed.    Anesthesia Type:   General     Note:    Oropharynx: spontaneously breathing and oropharynx clear of all foreign objects  Level of Consciousness: awake  Oxygen Supplementation: nasal cannula  Level of Supplemental Oxygen (L/min / FiO2): 2  Independent Airway: airway patency satisfactory and stable  Dentition: dentition unchanged  Vital Signs Stable: post-procedure vital signs reviewed and stable  Report to RN Given: handoff report given  Patient transferred to: PACU  Comments: Resps easy and regular. Report to PACU RN  Handoff Report: Identifed the Patient, Identified the Reponsible Provider, Reviewed the pertinent medical history, Discussed the surgical course, Reviewed Intra-OP anesthesia mangement and issues during anesthesia, Set expectations for post-procedure period and Allowed opportunity for questions and acknowledgement of understanding      Vitals:  Vitals Value Taken Time   /63    Temp 97.2    Pulse 82    Resp 12    SpO2 100%        Electronically Signed By: TENZIN NUNES CRNA  May 23, 2022  11:31 AM

## 2022-05-23 NOTE — BRIEF OP NOTE
[facility] Brief Operative Note    Pre-operative diagnosis: Left forearm wound   Post-operative diagnosis Same as preop   Procedure: Procedure(s):  LEFT FOREARM WOUND RECONSTRUCTION WITH SPLIT-THICKNESS SKIN GRAFT (5 x 2.5 cm) TAKEN FROM THE RIGHT THIGH   Surgeon(s): Surgeon(s) and Role:     * Thom Adames MD - Primary   Estimated blood loss: 5 mL    Specimens: None   Findings: Well-incorporated Integra. Small split thickness skin graft taken from the R thigh, sheet with small drainage holes.

## 2022-05-23 NOTE — DISCHARGE INSTRUCTIONS
Hand Surgery Discharge Instructions    Patient has been treated for a left forearm donor site reconstruction with Dr. Adames on 5/23/2022.     Care: Please keep the splint/cast/dressing clean and dry at all times. Should it get wet, please call the clinic to schedule an appointment - as it may need to be replaced. Do not hesitate to use the sling to help protect the affected extremity and in particular in the setting of a nerve block.     Medications: You can take Ibuprofen 400-800 mg and Tylenol 650 mg for pain relief. Please take each every 6 hours, and for optimal pain relief - please stagger the medications so that you are taking one or the other every 3 hours. If you had a nerve block, the effects may last 8-12 hours. If you have been prescribed additional pain medications, please take as instructed and as needed. If you are taking additional pain medications, please do not exceed 4000 mg of Tylenol daily from all sources. Also, if you are taking narcotic medications, please do not operate heavy machinery or drive. If you have been prescribed antibiotics, please also take as instructed.     Diet: Start with clear liquids and slow down if nauseated. Advance the diet as tolerated.    Weight-bearing/Activities: Move your fingers to prevent stiffness. Elevate the affected extremity to improve throbbing sensation or pain. No strenuous activities and do not raise your heart rate above 100 bpm for the first few weeks. You can limit your weight-bearing with the affected extremity to 2-3 pounds unless otherwise specified.    ER Instructions: Please call the office and/or consider return to the ER if you experience worsening pain not relieved by medications, increased swelling, redness or high fevers >101F or if there are unexpected problems like shortness of breath.    Post-op follow-up: Clinic in ~1 week with Dr. Adames at the [facility] or [facility]    Thom Adames MD, PhD     [facility]  Surgery and Procedure Center  Home Care Following Anesthesia  For 24 hours after surgery:  Get plenty of rest.  A responsible adult must stay with you for at least 24 hours after you leave the surgery center.  Do not drive or use heavy equipment.  If you have weakness or tingling, don't drive or use heavy equipment until this feeling goes away.   Do not drink alcohol.   Avoid strenuous or risky activities.  Ask for help when climbing stairs.  You may feel lightheaded.  IF so, sit for a few minutes before standing.  Have someone help you get up.   If you have nausea (feel sick to your stomach): Drink only clear liquids such as apple juice, ginger ale, broth or 7-Up.  Rest may also help.  Be sure to drink enough fluids.  Move to a regular diet as you feel able.   You may have a slight fever.  Call the doctor if your fever is over 100 F (37.7 C) (taken under the tongue) or lasts longer than 24 hours.  You may have a dry mouth, a sore throat, muscle aches or trouble sleeping. These should go away after 24 hours.  Do not make important or legal decisions.   It is recommended to avoid smoking.               Tips for taking pain medications  To get the best pain relief possible, remember these points:  Take pain medications as directed, before pain becomes severe.  Pain medication can upset your stomach: taking it with food may help.  Constipation is a common side effect of pain medication. Drink plenty of  fluids.  Eat foods high in fiber. Take a stool softener if recommended by your doctor or pharmacist.  Do not drink alcohol, drive or operate machinery while taking pain medications.  Ask about other ways to control pain, such as with heat, ice or relaxation.    Tylenol/Acetaminophen Consumption  To help encourage the safe use of acetaminophen, the makers of TYLENOL  have lowered the maximum daily dose for single-ingredient Extra Strength TYLENOL  (acetaminophen) products sold in the [place] from 8 pills per day (4,000 mg)  to 6 pills per day (3,000 mg). The dosing interval has also changed from 2 pills every 4-6 hours to 2 pills every 6 hours.  If you feel your pain relief is insufficient, you may take Tylenol/Acetaminophen in addition to your narcotic pain medication.   Be careful not to exceed 3,000 mg of Tylenol/Acetaminophen in a 24 hour period from all sources.  If you are taking extra strength Tylenol/acetaminophen (500 mg), the maximum dose is 6 tablets in 24 hours.  If you are taking regular strength acetaminophen (325 mg), the maximum dose is 9 tablets in 24 hours.    Call a doctor for any of the following:  Signs of infection (fever, growing tenderness at the surgery site, a large amount of drainage or bleeding, severe pain, foul-smelling drainage, redness, swelling).  It has been over 8 to 10 hours since surgery and you are still not able to urinate (pass water).  Headache for over 24 hours.  Numbness, tingling or weakness the day after surgery (if you had spinal anesthesia).  Signs of Covid-19 infection (temperature over 100 degrees, shortness of breath, cough, loss of taste/smell, generalized body aches, persistent headache, chills, sore throat, nausea/vomiting/diarrhea)  Your doctor is:       Dr. Thom Adames, Plastic Surgery: [phone]               Or dial [phone] and ask for the resident on call for:  Plastics  For emergency care, call the:  [facility]:  [phone] (TTY for hearing impaired: [phone])

## 2022-05-23 NOTE — OP NOTE
PLASTIC SURGERY OPERATIVE REPORT     Date of Surgery: 5/23/2022  Surgical Service: Plastic Surgery     Preoperative Diagnosis: Left forearm wound status post Integra placement at the free flap donor site     Postoperative Diagnosis: Same as preoperative diagnoses     Procedures Performed: Left forearm wound reconstruction with split thickness skin grafting from the right thigh (5 x 2.5 cm, 12.5 cm squared total)     Attending:  DARIUS Adames  Assistant: None     Complications: None apparent  Specimens: None  Implants: None  Estimated blood loss: < 5 mL  Tourniquet time: 0 minutes  Wound classification: Clean  Anesthesia: General     Indications for Procedure: 32 year-old female presenting >3 weeks status post right dorsal foot wound reconstruction with a left radial forearm free flap. The left forearm donor site was managed with Integra and is now fully incorporated. Patient presents for left forearm wound reconstruction with a split thickness skin graft from the right thigh. No changes in the history or exam. The right foot free flap is viable and swelling improved with dangling around 4 hours three times per day.      Intraoperative findings: The left forearm donor site wound measures approximately 5 x 2.5 cm and exhibits well-incorporated Integra with healthy granulation base after gentle debridement with the edge of a blade.      Description of Procedure: Patient was seen in the preoperative holding area.  Consent was verified.  The left forearm and right thigh was marked.  All additional questions were answered.  The risks of the surgery were reiterated, including (but not limited to): infection, bleeding, scarring, pain, injury to surrounding structures including nerves and tendons, need for additional revision surgery, neuroma formation, complex regional pain syndrome, stiffness, skin graft loss, wound healing issues.  Following discussion of all these risks, the patient again agreed to proceed with surgery.   "Patient was then transferred to the operating room and placed supine on the operating table.  All pressure points were padded.  Sequential compression devices were placed on bilateral lower extremities and verified to be operational.  IV antibiotic prophylaxis was given.  Preinduction timeout was performed.  General anesthesia was started. The left forearm was prepped with Betadine solution and the right thigh with chlorhexidine. Both sites were draped in usual sterile fashion. At the start of the case, the top silicone layer of Integra was removed. Any eschar at the edges as well as residual undissolved Chromic suture was also removed. The wound based appeared very clean and healthy, but was gently sharply scraped until there was punctate bleeding throughout. Next, the wound was irrigated with sterile saline. Once done, the dimensions of the wound were measured to be 5 x 2.5 cm. A 0.012\" split thickness skin graft was taken from the right thigh using a Dermatome and sterile mineral oil in conventional fashion. Once done, the sheet graft was gently perforated and sewn into place using 4-0 Chromic suture in interrupted and running continuous fashion. The right thigh donor site was initially dressed with epinephrine-containing 1% lidocaine soaked Telfa, which was eventually removed and replaced with sterile Ioban dressing only. The left forearm bolster was made from a sterile prep sponge wrapped in Xeroform/bacitracin ointment and sewn in place with 3-0 Nylon interrupted suture at the periphery. The edges of the bolster were dressed with bacitracin, 4 x 4\" gauze, and a volar wrist-spanning splint was placed with cast padding, plaster material and an ACE bandage. The patient was then woken up with no issues and transferred to the PACU with no events.      Postoperative plan: Clinic in approximately 1 week. Continue gurpreet protocol.      Thom Adames MD, PhD    "

## 2022-05-23 NOTE — H&P
"PRS H&P    Patient is >3 weeks s/p L RFFF to the R foot. She had Integra placed on the L forearm donor site, which is ready for skin grafting. Donor site agreed upon is the R thigh. All questions answered. No changes in history or exam.     ROS: Negative, see HPI  PMH: Nondiabetic  PSH: As above. No surgeries to the thighs or forearms  Medications: APAP, Vistaril, Senna  Allergies: None  SH: Nonsmoker, denies tobacco or nicotine use  FH: No bleeding or clotting issues or problems with anesthesia    Examination:  BP 95/81   Pulse 78   Temp (!) 96.7  F (35.9  C) (Skin)   Resp 18   Ht 1.727 m (5' 8\")   Wt 65.8 kg (145 lb)   LMP 04/21/2022 (Exact Date)   SpO2 100%   BMI 22.05 kg/m    Nonlabored breathing  Regular rate and rhythm  Not distressed  R foot flap is viable with 3 sec cap refill  L Integra well-incorporated    A/P: 32F >3 weeks s/p L RFFF to the R foot, Integra placement at donor site    -OR today for LEFT wound reconstruction with split thickness skin grafting from the RIGHT thigh  -Discussed the risks of surgery, including (but not limited to): infection, bleeding, pain, poor scarring, hematoma, seroma, skin graft loss, need for revision surgery. Despite these risks patient consents to surgery.  -Clinic in 1 weeks for caryn Adames MD, PhD  "

## 2022-05-23 NOTE — ANESTHESIA PREPROCEDURE EVALUATION
Anesthesia Pre-Procedure Evaluation    Patient: Reyna Martinez   MRN: [id number] : 1990        Procedure : Procedure(s):  LEFT FOREARM WOUND RECONSTRUCTION WITH SPLIT-THICKNESS SKIN GRAFT TAKEN FROM THE RIGHT THIGH          No past medical history on file.   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, Application of VAC Dressing;  Surgeon: Raoul Seals MD;  Location: [facility] OR     EXCISE MASS TOE Right 2019    Procedure: Excision of mass Right great toe;  Surgeon: Raoul Seals MD;  Location: [facility] OR     GRAFT FREE VASCULARIZED (LOCATION) N/A 2022    Procedure: Primary neurorrhaphy between cut and of deep peroneal nerve and lateral antebrachial cutaneous nerve in the left forearm flap, Placement of Integra bilaminar neodermis onto the left radial forearm flap donor site (4.5 cm x 3.5 cm), Placement of posterior splint onto the right leg, Placement of volar wrist splint onto the left forearm;  Surgeon: Thom Adames MD;  Location: [facility] OR     GRAFT FREE VASCULARIZED (LOCATION)  2022    Procedure: ;  Surgeon: Thom Adames MD;  [facility]: [facility]     GRAFT SKIN SPLIT THICKNESS FROM EXTREMITY Right 2019    Procedure: with right groin skin graft;  Surgeon: Renato Tinoco MD;  Location: [facility]     GRAFT SKIN SPLIT THICKNESS FROM EXTREMITY Right 2019    Procedure: Right toe biologics dressing;  Surgeon: Renato Tinoco MD;  Location: [facility]     IRRIGATION AND DEBRIDEMENT FOOT, COMBINED Right 2022    Procedure: Right dorsal foot irrigation and debridement using a sharp blade with additional deep and inferior skin margin excisions;  Surgeon: Thom Adames MD;  [facility]: [facility]     IRRIGATION AND DEBRIDEMENT LOWER EXTREMITY, COMBINED Right 2019    Procedure: Right toe wound coverage;  Surgeon: Alejandrina  MD Renato;  Location: [facility] OR     RECONSTRUCT FOOT Right 4/28/2022    Procedure: Right extensor hallucis longus tendon reconstruction with use of right second toe extensor tendon graft, Right dorsal foot wound reconstruction with free skin flap taken from the left radial forearm, with additional complexity attributable to additional venous anastomoses as well as neurotization ;  Surgeon: Thom Adames MD;  Location: [facility] OR      No Known Allergies   Social History     Tobacco Use     Smoking status: Never Smoker     Smokeless tobacco: Never Used   Substance Use Topics     Alcohol use: Never      Wt Readings from Last 1 Encounters:   04/28/22 65.3 kg (143 lb 15.4 oz)        Anesthesia Evaluation   Pt has had prior anesthetic.     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      GI/Hepatic:  - neg GI/hepatic ROS     Renal/Genitourinary:  - neg Renal ROS     Endo:  - neg endo ROS     Psychiatric/Substance Use:  - neg psychiatric ROS     Infectious Disease:  - neg infectious disease ROS     Malignancy:       Other:            Physical Exam    Airway        Mallampati: I   TM distance: > 3 FB   Neck ROM: full   Mouth opening: > 3 cm    Respiratory Devices and Support         Dental  no notable dental history         Cardiovascular          Rhythm and rate: regular and normal     Pulmonary           breath sounds clear to auscultation           OUTSIDE LABS:  CBC:   Lab Results   Component Value Date    WBC 9.8 04/29/2022    WBC 12.8 (H) 04/28/2022    HGB 9.9 (L) 04/29/2022    HGB 11.3 (L) 04/28/2022    HCT 30.1 (L) 04/29/2022    HCT 32.8 (L) 04/28/2022     05/04/2022     05/01/2022      BMP:   Lab Results   Component Value Date     04/29/2022     04/28/2022    POTASSIUM 4.0 04/29/2022    POTASSIUM 4.0 04/28/2022    CHLORIDE 110 (H) 04/29/2022    CHLORIDE 108 04/28/2022    CO2 26 04/29/2022    CO2 22 04/28/2022    BUN 7 04/29/2022    BUN 11 04/28/2022    CR 0.56 04/29/2022    CR 0.57 04/28/2022     (H) 04/29/2022     (H) 04/29/2022     COAGS: No results found for: PTT, INR, FIBR  POC:   Lab Results   Component Value Date    BGM 72 12/30/2019    HCG Negative 12/30/2019     HEPATIC: No results found for: ALBUMIN, PROTTOTAL, ALT, AST, GGT, ALKPHOS, BILITOTAL, BILIDIRECT, MERRICK  OTHER:   Lab Results   Component Value Date    PH 7.44 04/28/2022    LACT 2.0 04/28/2022    DAVIDA 8.2 (L) 04/29/2022    MAG 2.1 04/29/2022       Anesthesia Plan    ASA Status:  2   NPO Status:  NPO Appropriate    Anesthesia Type: General.     - Airway: LMA   Induction: Intravenous.   Maintenance: Balanced.        Consents    Anesthesia Plan(s) and associated risks, benefits, and realistic alternatives discussed. Questions answered and patient/representative(s) expressed understanding.     - Discussed: Risks, Benefits and Alternatives for the PROCEDURE were discussed     - Discussed with:  Patient      - Extended Intubation/Ventilatory Support Discussed: No.      - Patient is DNR/DNI Status: No         Postoperative Care    Pain management: Multi-modal analgesia.   PONV prophylaxis: Ondansetron (or other 5HT-3), Dexamethasone or Solumedrol     Comments:           H&P reviewed: Unable to attach H&P to encounter due to EHR limitations. H&P Update: appropriate H&P reviewed, patient examined. No interval changes since H&P (within 30 days).         Asad Jorge MD

## 2022-05-23 NOTE — ANESTHESIA POSTPROCEDURE EVALUATION
Patient: Reyna Martinez    Procedure: Procedure(s):  LEFT FOREARM WOUND RECONSTRUCTION WITH SPLIT-THICKNESS SKIN GRAFT TAKEN FROM THE RIGHT THIGH       Anesthesia Type:  General    Note:  Disposition: Outpatient   Postop Pain Control: Uneventful            Sign Out: Well controlled pain   PONV: No   Neuro/Psych: Uneventful            Sign Out: Acceptable/Baseline neuro status   Airway/Respiratory: Uneventful            Sign Out: Acceptable/Baseline resp. status   CV/Hemodynamics: Uneventful            Sign Out: Acceptable CV status; No obvious hypovolemia; No obvious fluid overload   Other NRE: NONE   DID A NON-ROUTINE EVENT OCCUR? No           Last vitals:  Vitals Value Taken Time   /63 05/23/22 1145   Temp 36.3  C (97.4  F) 05/23/22 1130   Pulse 82 05/23/22 1145   Resp 18 05/23/22 1145   SpO2 99 % 05/23/22 1145       Electronically Signed By: Prabhjot Jefferson MD, MD  May 23, 2022  11:51 AM

## 2022-06-03 ENCOUNTER — OFFICE VISIT (OUTPATIENT)
Dept: SURGERY | Facility: CLINIC | Age: 32
End: 2022-06-03
Payer: COMMERCIAL

## 2022-06-03 DIAGNOSIS — S81.802D WOUND OF LEFT LOWER EXTREMITY, SUBSEQUENT ENCOUNTER: Primary | ICD-10-CM

## 2022-06-03 PROCEDURE — 99024 POSTOP FOLLOW-UP VISIT: CPT | Performed by: STUDENT IN AN ORGANIZED HEALTH CARE EDUCATION/TRAINING PROGRAM

## 2022-06-03 NOTE — PROGRESS NOTES
PRS    No issues, except had some leakage around the right thigh skin graft dressing, which was dried and reinforced.  Has been limiting the foot down to about 4 hours per time.  Has been ambulating but only heel touch.  When showering, has been sitting.    On exam, right foot flap is well-healing with much less swelling.  Left forearm skin graft fully taken and incisions well-healing.    A/P: 32-year-old female 4 weeks status post right foot wound reconstruction with great toe extensor tendon graft reconstruction, free radial forearm flap taken from the left forearm, Integra with full-thickness skin graft to the donor site    -Apply Adaptic and petroleum based ointment to the left forearm skin graft once daily  -Patient can shower and let water run over all the incisions including the skin graft, but no scrubbing and only gentle pad or air drying  -Patient can apply bio oil to all the incisions including at the margins of the flap  -Continue heel touch only weightbearing of the right foot  -Instructed patient to continue to perform wiggle great toe extension exercises  -Patient will return in approximately 3 weeks for reevaluation with photography and possibly start of placement of the right foot into shoe or boot    Thom Adames MD, PhD

## 2022-06-03 NOTE — LETTER
6/3/2022         RE: Reyna Martinez  [address] [address] [place] [id number]        Dear Colleague,    Thank you for referring your patient, Reyna Martinez, to the [facility]. Please see a copy of my visit note below.    PRS    No issues, except had some leakage around the right thigh skin graft dressing, which was dried and reinforced.  Has been limiting the foot down to about 4 hours per time.  Has been ambulating but only heel touch.  When showering, has been sitting.    On exam, right foot flap is well-healing with much less swelling.  Left forearm skin graft fully taken and incisions well-healing.    A/P: 32-year-old female 4 weeks status post right foot wound reconstruction with great toe extensor tendon graft reconstruction, free radial forearm flap taken from the left forearm, Integra with full-thickness skin graft to the donor site    -Apply Adaptic and petroleum based ointment to the left forearm skin graft once daily  -Patient can shower and let water run over all the incisions including the skin graft, but no scrubbing and only gentle pad or air drying  -Patient can apply bio oil to all the incisions including at the margins of the flap  -Continue heel touch only weightbearing of the right foot  -Instructed patient to continue to perform wiggle great toe extension exercises  -Patient will return in approximately 3 weeks for reevaluation with photography and possibly start of placement of the right foot into shoe or boot    Thom Adames MD, PhD          Again, thank you for allowing me to participate in the care of your patient.        Sincerely,        Thom Adames MD

## 2022-06-03 NOTE — NURSING NOTE
Reyna Martinez's goals for this visit include:   Chief Complaint   Patient presents with     RECHECK       She requests these members of her care team be copied on today's visit information:     PCP: Morelia Atkinson    Referring Provider:  No referring provider defined for this encounter.    LMP 04/21/2022 (Exact Date)     Do you need any medication refills at today's visit? Nery Fitzpatrick [facility]

## 2022-06-08 ENCOUNTER — MYC MEDICAL ADVICE (OUTPATIENT)
Dept: SURGERY | Facility: CLINIC | Age: 32
End: 2022-06-08
Payer: COMMERCIAL

## 2022-06-08 NOTE — TELEPHONE ENCOUNTER
Reached out to pt today to discuss photo that she sent of left wrist skin graft. Pt reports that she started noticing the bubbling of the graft on Sunday. She does not have any drainage, fevers or chills. The graft is slightly tender but not more than it has been since her surgery. She is not very worried but just wanted us to be aware that this was happening. She does not have a ride to come to clinic today and lives about 1.5 hr away from [facility]. She could come to [facility] on Friday at 8:20 am if Dr Adames wants to see her. Plan made to update Dr Fraga and make a plan, call pt after hearing back from Dr Adames.  Caden POLANCO RN      Addendum:  Spoke with Dr Fraga and updated pt to continue with the current plan of petroleum based ointment, non-adherent dressing and light compression with ACE bandage. Compression with ACE will be more important now. The blister will decompress at some point. Pt is in agreement with plan and has an appt on 6/24 but will call if there are any changes.  Caden POLANCO RN

## 2022-06-15 ENCOUNTER — TELEPHONE (OUTPATIENT)
Dept: PLASTIC SURGERY | Facility: CLINIC | Age: 32
End: 2022-06-15
Payer: COMMERCIAL

## 2022-06-15 NOTE — TELEPHONE ENCOUNTER
[facility] Call [facility]    Phone Message    May a detailed message be left on voicemail: yes     Reason for Call: Other: Reyna is calling in asking for a call back. She states that she had surgery with Dr. Adames on 5/23 and had her first post-op appt on 6/3. Following this appt, she was told that she should not put weight on her foot until she met with Dr. Adames again. Patient had appt scheduled for 6/24, but was told this would need to be rescheduled and soonest available appt in [place] or [place] is 7/13. Pt is unsure if she can wait that long for this appt and would like to speak with someone about other possible options.. Please call back as soon as possible to discuss.    Action Taken: Message routed to:  [facility] Plastic Surg    Travel Screening: Not Applicable

## 2022-06-16 ENCOUNTER — MYC MEDICAL ADVICE (OUTPATIENT)
Dept: SURGERY | Facility: CLINIC | Age: 32
End: 2022-06-16
Payer: COMMERCIAL

## 2022-06-16 NOTE — TELEPHONE ENCOUNTER
Spoke with pt, appt arranged for next week. JUDY Lopez called pt to discuss her dressing questions.

## 2022-06-22 ENCOUNTER — OFFICE VISIT (OUTPATIENT)
Dept: PLASTIC SURGERY | Facility: CLINIC | Age: 32
End: 2022-06-22
Payer: COMMERCIAL

## 2022-06-22 VITALS
HEIGHT: 68 IN | BODY MASS INDEX: 21.98 KG/M2 | SYSTOLIC BLOOD PRESSURE: 106 MMHG | HEART RATE: 75 BPM | TEMPERATURE: 98.7 F | WEIGHT: 145 LBS | DIASTOLIC BLOOD PRESSURE: 62 MMHG | OXYGEN SATURATION: 99 %

## 2022-06-22 DIAGNOSIS — D21.21: Primary | ICD-10-CM

## 2022-06-22 PROCEDURE — 99024 POSTOP FOLLOW-UP VISIT: CPT | Performed by: PHYSICIAN ASSISTANT

## 2022-06-22 ASSESSMENT — PAIN SCALES - GENERAL: PAINLEVEL: MILD PAIN (2)

## 2022-06-22 NOTE — LETTER
6/22/2022       RE: Reyna Martinez  [address] [facility] [address]     Dear Colleague,    Thank you for referring your patient, Reyna Martinez, to the [facility] PLASTIC AND RECONSTRUCTIVE SURGERY CLINIC [place] at [facility]. Please see a copy of my visit note below.    Plastic and Reconstructive Surgery  6/22/2022    S: Reyna is doing well. She is here with her . She has been heel touch weight bearing without issue. She had a large blister to her skin graft which as resolved. She has been applying nonadherent gauze and ace wrap to skin graft.     O:   Gen: well appearing, NAD   Foot: incision well healed. Mild swelling to right foot that improves with elevation. No skin breakdown or incision break down. 2 residual sutures near great toe that are dissolving. No drainage from incision. Incision edges are deep purple/pink. Pedal pulse intact. Active dorsiflexion of great toe is intact, but very weak. At rest, ankle has slight favoring to inversion, but corrects with stretching   Wrist: incision well healed to forearm. Graft site is >90% taken. Small area of blistering. No drainage. No redness  Thigh: right thigh donor site well healed. Pink and painless.     A/P:   32 year old female s/p right dorsal foot irrigation, debridement, right great toe extensor tendon reconstruction with free forearm flap on 4/28/2022    Continue partial weight bearing with heel walking only  Okay to use CAM boot, as long as pressure is avoided to flap  Elevate leg to assist with swelling  Okay to do home PT exercises per hospital discharge  Apply aquaphor to skin graft to keep moist    Follow up 7/15 in [place]. If appointment is not able to be at [place], will Follow-up at [facility] on 7/20    Reyna Figueroa PA-C on 6/22/2022 at 3:17 PM      Sincerely,    Thom Adames MD

## 2022-06-22 NOTE — PROGRESS NOTES
Plastic and Reconstructive Surgery  6/22/2022    S: Reyna is doing well. She is here with her . She has been heel touch weight bearing without issue. She had a large blister to her skin graft which as resolved. She has been applying nonadherent gauze and ace wrap to skin graft.     O:   Gen: well appearing, NAD   Foot: incision well healed. Mild swelling to right foot that improves with elevation. No skin breakdown or incision break down. 2 residual sutures near great toe that are dissolving. No drainage from incision. Incision edges are deep purple/pink. Pedal pulse intact. Active dorsiflexion of great toe is intact, but very weak. At rest, ankle has slight favoring to inversion, but corrects with stretching   Wrist: incision well healed to forearm. Graft site is >90% taken. Small area of blistering. No drainage. No redness  Thigh: right thigh donor site well healed. Pink and painless.     A/P:   32 year old female s/p right dorsal foot irrigation, debridement, right great toe extensor tendon reconstruction with free forearm flap on 4/28/2022    Continue partial weight bearing with heel walking only  Okay to use CAM boot, as long as pressure is avoided to flap  Elevate leg to assist with swelling  Okay to do home PT exercises per hospital discharge  Apply aquaphor to skin graft to keep moist    Follow up 7/15 in [place]. If appointment is not able to be at [place], will Follow-up at [facility] on 7/20    Reyna Figueroa PA-C on 6/22/2022 at 3:17 PM

## 2022-06-22 NOTE — NURSING NOTE
"Chief Complaint   Patient presents with     SHADI Orellana, is being seen today for a post -op check.       Vitals:    06/22/22 1207   BP: 106/62   BP Location: Left arm   Patient Position: Chair   Cuff Size: Adult Regular   Pulse: 75   Temp: 98.7  F (37.1  C)   TempSrc: Oral   SpO2: 99%   Weight: 65.8 kg (145 lb)   Height: 1.727 m (5' 8\")       Body mass index is 22.05 kg/m .      Nancy Brown LPN    "

## 2022-07-15 ENCOUNTER — OFFICE VISIT (OUTPATIENT)
Dept: SURGERY | Facility: CLINIC | Age: 32
End: 2022-07-15
Payer: COMMERCIAL

## 2022-07-15 DIAGNOSIS — S81.802D WOUND OF LEFT LOWER EXTREMITY, SUBSEQUENT ENCOUNTER: Primary | ICD-10-CM

## 2022-07-15 PROCEDURE — 99024 POSTOP FOLLOW-UP VISIT: CPT | Performed by: STUDENT IN AN ORGANIZED HEALTH CARE EDUCATION/TRAINING PROGRAM

## 2022-07-15 NOTE — LETTER
7/15/2022         RE: Reyna Martinez  [address] [address] [place] [id number]        Dear Colleague,    Thank you for referring your patient, Reyna Martinez, to the [facility]. Please see a copy of my visit note below.    PRS    Returns for 11-week follow-up.  Patient doing well.  Continues to heel touch weightbearing only.  Has been wearing the right boot, but has not yet worn shoes.  Has been avoiding the sun if possible or using sunscreen.    On exam, the right dorsal foot free flap is well-healed and incorporated nicely with a light tan.  The right great toe extensor reconstruction is intact and patient is able to actively extend the great toe slightly.  Left forearm donor site is well-healed with some hypertrophic but immature scarring.  Right thigh skin graft donor site is healing expectantly.    A/P: 32-year-old female status post right great toe extensor and wound reconstruction with use of tendon graft and left radial forearm free flap with skin grafting, doing well    -Patient will be able to start wearing shoes at 3 months postoperatively.  At that time, patient can also pushoff with her foot.  -Continue scar treatment with silicone-based ointment or Bio-oil  -Encouraged patient to use sunscreen over the incisions when UV exposed to limit the hyperpigmentation of the scar  -Photography today  -Return to clinic in 3 to 4 months for reevaluation    Thom Adames MD, PhD      Again, thank you for allowing me to participate in the care of your patient.        Sincerely,        Thom Adames MD

## 2022-07-15 NOTE — PROGRESS NOTES
PRS    Returns for 11-week follow-up.  Patient doing well.  Continues to heel touch weightbearing only.  Has been wearing the right boot, but has not yet worn shoes.  Has been avoiding the sun if possible or using sunscreen.    On exam, the right dorsal foot free flap is well-healed and incorporated nicely with a light tan.  The right great toe extensor reconstruction is intact and patient is able to actively extend the great toe slightly.  Left forearm donor site is well-healed with some hypertrophic but immature scarring.  Right thigh skin graft donor site is healing expectantly.    A/P: 32-year-old female status post right great toe extensor and wound reconstruction with use of tendon graft and left radial forearm free flap with skin grafting, doing well    -Patient will be able to start wearing shoes at 3 months postoperatively.  At that time, patient can also pushoff with her foot.  -Continue scar treatment with silicone-based ointment or Bio-oil  -Encouraged patient to use sunscreen over the incisions when UV exposed to limit the hyperpigmentation of the scar  -Photography today  -Return to clinic in 3 to 4 months for reevaluation    Thom Adames MD, PhD

## 2022-07-15 NOTE — NURSING NOTE
Reyna Martinez's goals for this visit include:   Chief Complaint   Patient presents with     RECHECK     Recheck left wrist, right thigh and foot       She requests these members of her care team be copied on today's visit information: no    PCP: Morelia Atkinson    Referring Provider:  No referring provider defined for this encounter.    There were no vitals taken for this visit.    Do you need any medication refills at today's visit? No    Salina Bolaños LPN

## 2022-09-18 ENCOUNTER — HEALTH MAINTENANCE LETTER (OUTPATIENT)
Age: 32
End: 2022-09-18

## 2022-11-18 ENCOUNTER — OFFICE VISIT (OUTPATIENT)
Dept: SURGERY | Facility: CLINIC | Age: 32
End: 2022-11-18
Payer: COMMERCIAL

## 2022-11-18 DIAGNOSIS — S81.802D WOUND OF LEFT LOWER EXTREMITY, SUBSEQUENT ENCOUNTER: Primary | ICD-10-CM

## 2022-11-18 PROCEDURE — 99213 OFFICE O/P EST LOW 20 MIN: CPT | Performed by: STUDENT IN AN ORGANIZED HEALTH CARE EDUCATION/TRAINING PROGRAM

## 2022-11-18 NOTE — LETTER
11/18/2022         RE: Reyna Martinez  [address] [address] [place] [id number]        Dear Colleague,    Thank you for referring your patient, Reyna Martinez, to the [facility]. Please see a copy of my visit note below.    PRS    No issues.  Has been able to wear her shoes but has noticed that her foot is a little bit bigger.  No concern for recurrence of the mass. She can lift her great toe.    On exam, the right dorsal foot free flap is well-healed.  Right great toe extensor reconstruction is intact.  Left forearm donor site is well-healed with some hypertrophic but improved scarring.  Right thigh donor site hyperpigmentation is fading.    A/P: 32-year-old female status post right great toe extensor and wound reconstruction with use of tendon graft and left radial forearm free flap with skin grafting, doing well    -Continue scar treatment with silicone-based sheeting or Bio-oil.  If UV exposed, my recommendation is to liberally use sunscreen.  -Discussed possibility of using steroid intralesionally to help flatten out hypertrophic scarring, but also explained that scarring will also continue to remodel over the next 4-5 months.  -Photography done  -Return to clinic in 3-4 months for reevaluation    Thom Adames MD, PhD      Again, thank you for allowing me to participate in the care of your patient.        Sincerely,        Thom Adames MD

## 2022-11-18 NOTE — NURSING NOTE
Reyna Martinez's goals for this visit include:   Chief Complaint   Patient presents with     RECHECK     6 month recheck, skin graft left forearm       She requests these members of her care team be copied on today's visit information: no    PCP: Morelia Atkinson    Referring Provider:  No referring provider defined for this encounter.    There were no vitals taken for this visit.    Do you need any medication refills at today's visit? No    [facility] Visit Facilitator- Surgical Specialties

## 2022-11-18 NOTE — PROGRESS NOTES
PRS    No issues.  Has been able to wear her shoes but has noticed that her foot is a little bit bigger.  No concern for recurrence of the mass. She can lift her great toe.    On exam, the right dorsal foot free flap is well-healed.  Right great toe extensor reconstruction is intact.  Left forearm donor site is well-healed with some hypertrophic but improved scarring.  Right thigh donor site hyperpigmentation is fading.    A/P: 32-year-old female status post right great toe extensor and wound reconstruction with use of tendon graft and left radial forearm free flap with skin grafting, doing well    -Continue scar treatment with silicone-based sheeting or Bio-oil.  If UV exposed, my recommendation is to liberally use sunscreen.  -Discussed possibility of using steroid intralesionally to help flatten out hypertrophic scarring, but also explained that scarring will also continue to remodel over the next 4-5 months.  -Photography done  -Return to clinic in 3-4 months for reevaluation    Thom Adames MD, PhD

## 2023-01-29 ENCOUNTER — HEALTH MAINTENANCE LETTER (OUTPATIENT)
Age: 33
End: 2023-01-29

## 2023-02-17 ENCOUNTER — OFFICE VISIT (OUTPATIENT)
Dept: SURGERY | Facility: CLINIC | Age: 33
End: 2023-02-17
Payer: COMMERCIAL

## 2023-02-17 DIAGNOSIS — S81.802D WOUND OF LEFT LOWER EXTREMITY, SUBSEQUENT ENCOUNTER: Primary | ICD-10-CM

## 2023-02-17 PROCEDURE — 99213 OFFICE O/P EST LOW 20 MIN: CPT | Performed by: STUDENT IN AN ORGANIZED HEALTH CARE EDUCATION/TRAINING PROGRAM

## 2023-02-17 NOTE — LETTER
2/17/2023         RE: Reyna Martinez  [address] [address] [place] [id number]        Dear Colleague,    Thank you for referring your patient, Reyna Martinez, to the [facility]. Please see a copy of my visit note below.    PRS    No issues, except part of the left forearm scar is mildly tender.  He continues to improve over time.  No pain in the right foot.  Can do all activities.  Very happy with the result.    On exam, left forearm scar well-healing with short segments of hypertrophic scarring.  Right dorsal foot flap well-healed.  Patient can wiggle and dorsiflex the great toe with no toe drop.    A/P: 32-year-old female status post right dorsal foot recurrent dermatofibroma excision, right great toe extensor reconstruction, left radial forearm free flap reconstruction     -Continue scar treatment with silicone-based sheeting or Bio-oil.  If UV exposed, my recommendation is to liberally use sunscreen.  -Discussed possibility of using steroid intralesionally to help flatten out hypertrophic scarring.  -Photography done  -We will reach out to Dr. Seals regarding possible follow-up appointment  -A total of 20 minutes was devoted to review of chart, direct face-to-face patient counseling and documentation during this encounter, exclusive of any procedure performed.    Thom Adames MD, PhD      Again, thank you for allowing me to participate in the care of your patient.        Sincerely,        Thom Adames MD

## 2023-02-17 NOTE — NURSING NOTE
Reyna Martinez's chief complaint for this visit includes:  Chief Complaint   Patient presents with     RECHECK     3 month recheck, skin graft left forearm     PCP: Morelia Atkinson    Referring Provider:  No referring provider defined for this encounter.    There were no vitals taken for this visit.  Data Unavailable      No Known Allergies      Do you need any medication refills at today's visit? No    [facility] Visit Facilitator- Surgical Specialties

## 2023-02-17 NOTE — PROGRESS NOTES
PRS    No issues, except part of the left forearm scar is mildly tender.  He continues to improve over time.  No pain in the right foot.  Can do all activities.  Very happy with the result.    On exam, left forearm scar well-healing with short segments of hypertrophic scarring.  Right dorsal foot flap well-healed.  Patient can wiggle and dorsiflex the great toe with no toe drop.    A/P: 32-year-old female status post right dorsal foot recurrent dermatofibroma excision, right great toe extensor reconstruction, left radial forearm free flap reconstruction     -Continue scar treatment with silicone-based sheeting or Bio-oil.  If UV exposed, my recommendation is to liberally use sunscreen.  -Discussed possibility of using steroid intralesionally to help flatten out hypertrophic scarring.  -Photography done  -We will reach out to Dr. Seals regarding possible follow-up appointment  -A total of 20 minutes was devoted to review of chart, direct face-to-face patient counseling and documentation during this encounter, exclusive of any procedure performed.    Thom Adames MD, PhD

## 2024-02-25 ENCOUNTER — HEALTH MAINTENANCE LETTER (OUTPATIENT)
Age: 34
End: 2024-02-25

## 2025-03-15 ENCOUNTER — HEALTH MAINTENANCE LETTER (OUTPATIENT)
Age: 35
End: 2025-03-15

## (undated) DEVICE — GLOVE PROTEXIS MICRO 7.5  2D73PM75

## (undated) DEVICE — DRSG STERI STRIP 1/2X4" R1547

## (undated) DEVICE — BLADE DERMATOME ZIMMER  00-8800-000-10

## (undated) DEVICE — DRAPE ISOLATION BAG 1003

## (undated) DEVICE — DRAPE WARMER 66X44" ORS-300

## (undated) DEVICE — LINEN TOWEL PACK X30 5481

## (undated) DEVICE — GLOVE PROTEXIS W/NEU-THERA 7.0  2D73TE70

## (undated) DEVICE — SPONGE LAP 18X18" X8435

## (undated) DEVICE — BLADE KNIFE SURG 15 371115

## (undated) DEVICE — BNDG ELASTIC 4"X5YDS UNSTERILE 6611-40

## (undated) DEVICE — ESU GROUND PAD UNIVERSAL W/O CORD

## (undated) DEVICE — PREP POVIDONE IODINE SOLUTION 10% 4OZ

## (undated) DEVICE — Device

## (undated) DEVICE — SU VICRYL 3-0 SH 27" J316H

## (undated) DEVICE — DRSG TELFA 3X8" 1238

## (undated) DEVICE — BNDG ELASTIC 6" DBL LENGTH UNSTERILE 6611-16

## (undated) DEVICE — SU MONOCRYL 2-0 SH 27" UND Y417H

## (undated) DEVICE — ESU GROUND PAD ADULT W/CORD E7507

## (undated) DEVICE — RETR ELASTIC STAYS LONE STAR BLUNT DUAL LEAD 3550-1G

## (undated) DEVICE — SU CHROMIC 4-0 SH 27" G121H

## (undated) DEVICE — SUCTION MANIFOLD NEPTUNE 2 SYS 1 PORT 702-025-000

## (undated) DEVICE — KIT SPY ELITE DISP LC3006

## (undated) DEVICE — DRAPE MAYO STAND 23X54 8337

## (undated) DEVICE — DRSG GAUZE 4X4" TRAY 6939

## (undated) DEVICE — STRAP KNEE/BODY 31143004

## (undated) DEVICE — SUCTION MANIFOLD NEPTUNE 2 SYS 4 PORT 0702-020-000

## (undated) DEVICE — CLIP GEM MICRO GEM2431

## (undated) DEVICE — SU ETHILON 3-0 PS-1 18" 1663H

## (undated) DEVICE — SOL WATER IRRIG 1000ML BOTTLE 2F7114

## (undated) DEVICE — LINEN GOWN OVERSIZE 5408

## (undated) DEVICE — PREP POVIDONE-IODINE 7.5% SCRUB 4OZ BOTTLE MDS093945

## (undated) DEVICE — BASIN EMESIS STERILE  SSK9005A

## (undated) DEVICE — CLIP HORIZON MED BLUE 002200

## (undated) DEVICE — PREP CHLORAPREP 26ML TINTED ORANGE  260815

## (undated) DEVICE — PREP POVIDONE IODINE SOLUTION 10% 4OZ BOTTLE 29906-004

## (undated) DEVICE — NDL ANGIOCATH 22GA 1" 4050

## (undated) DEVICE — NDL 20GA 1"

## (undated) DEVICE — SU PROLENE 5-0 RB-1DA 36"  8556H

## (undated) DEVICE — PITCHER STERILE 1000ML  SSK9004A

## (undated) DEVICE — LABEL MEDICATION SYSTEM 3303-P

## (undated) DEVICE — GLOVE ESTEEM BLUE W/NEU-THERA 7.5  2D73PB75

## (undated) DEVICE — SU CHROMIC 3-0 SH 27" G122H

## (undated) DEVICE — PACK LOWER EXTREMITY RIVERSIDE SOP32LEFSX

## (undated) DEVICE — NDL ANGIOCATH 24GA 0.75" 4053

## (undated) DEVICE — DRAPE STOCKINETTE 8" 8586

## (undated) DEVICE — SOL NACL 0.9% IRRIG 500ML BOTTLE 2F7123

## (undated) DEVICE — GEL ULTRASOUND AQUASONIC 20GM 01-01

## (undated) DEVICE — BNDG ELASTIC 3"X5YDS UNSTERILE 6611-30

## (undated) DEVICE — PACK SET-UP STD 9102

## (undated) DEVICE — TOURNIQUET CUFF 30" REPRO BLUE 60-7070-105

## (undated) DEVICE — GLOVE PROTEXIS BLUE W/NEU-THERA 8.5  2D73EB85

## (undated) DEVICE — DRAPE U SPLIT 74X120" 29440

## (undated) DEVICE — GLOVE PROTEXIS W/NEU-THERA 7.5  2D73TE75

## (undated) DEVICE — LINEN GOWN XLG 5407

## (undated) DEVICE — DRSG ABDOMINAL 07 1/2X8" 7197D

## (undated) DEVICE — COVER CAMERA IN-LIGHT DISP LT-C02

## (undated) DEVICE — SYR 10ML LL W/O NDL 302995

## (undated) DEVICE — ESU ELEC BLADE 2.75" COATED/INSULATED E1455

## (undated) DEVICE — SU ETHILON 9-0 BV130-4 5" 2813G

## (undated) DEVICE — SUCTION TIP YANKAUER W/O VENT K86

## (undated) DEVICE — SUCTION FRAZIER 12FR W/HANDLE K73

## (undated) DEVICE — CAST PLASTER SPLINT 4X15" 7394

## (undated) DEVICE — PREP SKIN SCRUB TRAY 4461A

## (undated) DEVICE — CATH TRAY FOLEY SURESTEP 16FR W/TMP PRB STLK LATEX A319416AM

## (undated) DEVICE — PREP DURAPREP 26ML APL 8630

## (undated) DEVICE — STPL SKIN 35W ROTATING HEAD PRW35

## (undated) DEVICE — CLIP APPLIER 09 3/8" SM LIGACLIP MCS20

## (undated) DEVICE — DRSG XEROFORM 5X9" CUR253590W

## (undated) DEVICE — DRAPE EXTREMITY UPPER 120X76" 29414

## (undated) DEVICE — PREP POVIDONE IODINE SCRUB 7.5% 120ML

## (undated) DEVICE — PEN MARKING W/RULER DYNJSM04

## (undated) DEVICE — SU PROLENE 0 CT-1 30" 8424H

## (undated) DEVICE — DECANTER TRANSFER DEVICE 2008S

## (undated) DEVICE — DRAPE STERI TOWEL SM 1000

## (undated) DEVICE — SUCTION TIP YANKAUER STR K87

## (undated) DEVICE — GLOVE PROTEXIS POWDER FREE 7.5 ORTHOPEDIC 2D73ET75

## (undated) DEVICE — MARKER MARGIN MARKER STD 6 COLOR SGL USE MMS6

## (undated) DEVICE — LINEN ORTHO PACK 5446

## (undated) DEVICE — GLOVE PROTEXIS BLUE W/NEU-THERA 7.5  2D73EB75

## (undated) DEVICE — SOL NACL 0.9% IRRIG 1000ML BOTTLE 2F7124

## (undated) DEVICE — SU PLAIN FAST ABSORB 5-0 PC-1 18" 1915G

## (undated) DEVICE — SU PROLENE 2-0 CT-2 30" 8411H

## (undated) DEVICE — SU STRATAFIX MONOCRYL 3-0 SPIRAL PS-2 45CM SXMP1B107

## (undated) DEVICE — DRAPE MICRO ZEISS PENTERO 120X54" G650DL

## (undated) DEVICE — DERMACARRIER 1.5:1 ZIMMER 00-2195-012-00

## (undated) DEVICE — SU ETHILON 8-0 BV130-4 5" 2815G

## (undated) DEVICE — CAST PADDING 4" STERILE 9044S

## (undated) DEVICE — CLOSURE SYS SKIN PREMIERPRO EXOFIN FUSION 4X22CM STRL 3472

## (undated) DEVICE — LINEN TOWEL PACK X6 WHITE 5487

## (undated) DEVICE — CLIP HORIZON SM RED WIDE SLOT 001201

## (undated) DEVICE — GOWN IMPERVIOUS BREATHABLE SMART XLG 89045

## (undated) DEVICE — PACK HAND CUSTOM ASC

## (undated) DEVICE — LINEN TOWEL PACK X5 5464

## (undated) DEVICE — DRSG TEGADERM 8X12" 1629

## (undated) DEVICE — DRAPE POUCH INSTRUMENT 1018

## (undated) DEVICE — SENSOR MONITOR SM PATCH TISSUE VIOPTIX OXY-2-SPD-1-P5

## (undated) DEVICE — DRAPE SHEET REV FOLD 3/4 9349

## (undated) DEVICE — DRAPE IOBAN INCISE 23X17" 6650EZ

## (undated) DEVICE — WIPE INSTRUMENT MEROCEL 400200

## (undated) DEVICE — SYR 03ML LL W/O NDL 309657

## (undated) DEVICE — ESU FCP BIPOLAR NONSTICK STR 4"X0.4MM W/CORD 19-3002AU

## (undated) DEVICE — RETR ELASTIC STAYS LONE STAR SHARP 5MM 8/PACK 3311-8G

## (undated) DEVICE — BNDG ESMARK 6" STERILE LF 820-3612

## (undated) DEVICE — BNDG ESMARK 4" STERILE LF 820-3412

## (undated) DEVICE — SYR 05ML LL W/O NDL

## (undated) DEVICE — PREP CHLORAPREP 26ML TINTED HI-LITE ORANGE 930815

## (undated) DEVICE — SPONGE COTTONOID 1/4X1/4" 20-01S

## (undated) DEVICE — TUBING IRRIG CYSTO/BLADDER SET 81" LF 2C4040

## (undated) DEVICE — ESU PENCIL SMOKE EVAC W/ROCKER SWITCH 0703-047-000

## (undated) DEVICE — DRAPE FLUID WARMING 52"X66" ORS-301

## (undated) DEVICE — ESU CORD BIPOLAR GREEN 10-4000

## (undated) DEVICE — SU CHROMIC 3-0 FS-2 27" 636

## (undated) DEVICE — SYR BULB IRRIG DOVER 60 ML LATEX FREE 67000

## (undated) DEVICE — TOURNIQUET SGL BLADDER 18"X4" RED 5921-218-135

## (undated) DEVICE — ESU NDL COLORADO MICRO 3CM 45DEG N113A

## (undated) DEVICE — SU VICRYL 2-0 SH 27" UND J417H

## (undated) DEVICE — VESSEL LOOPS RED MINI 24000-01R

## (undated) DEVICE — SPECIMEN CONTAINER 5OZ STERILE 2600SA

## (undated) DEVICE — SPONGE COTTONOID 1/2X3" 20-07S

## (undated) DEVICE — RETR BLADE LONE STAR 20MM SPIRA 3 FINGER 3335-4G

## (undated) DEVICE — SU MONOCRYL 3-0 SH 27" UND Y416H

## (undated) DEVICE — ESU ELEC BLADE 6" COATED E1450-6

## (undated) DEVICE — DRSG KERLIX 4 1/2"X4YDS ROLL 6715

## (undated) DEVICE — SU CHROMIC 4-0 RB-1 27" U203H

## (undated) DEVICE — SUCTION CARDIAC FRAZIER TIP 6FR STERILE 3" 10053

## (undated) DEVICE — BLADE KNIFE SURG 10 371110

## (undated) DEVICE — SU CHROMIC 4-0 PS-2 18" 1637G

## (undated) DEVICE — EYE SPONGE SPEAR WECK CEL 0008685

## (undated) DEVICE — GOWN IMPERVIOUS SPECIALTY XLG/XLONG 32474

## (undated) DEVICE — GLOVE PROTEXIS BLUE W/NEU-THERA 8.0  2D73EB80

## (undated) DEVICE — CLIP HORIZON LG ORANGE 004200

## (undated) DEVICE — PREP DURAPREP REMOVER 4OZ 8611

## (undated) DEVICE — DRAPE TABLE OVERHEAD  80X90" 8380

## (undated) DEVICE — DRSG ADAPTIC 3X16"  6114

## (undated) DEVICE — PREP POVIDONE IODINE SCRUB 7.5% 4OZ APL82212

## (undated) DEVICE — GLOVE PROTEXIS W/NEU-THERA 8.0  2D73TE80

## (undated) DEVICE — GLOVE PROTEXIS POWDER FREE 8.5 ORTHOPEDIC 2D73ET85

## (undated) DEVICE — DRSG AQUACEL AG EXTRA 4X4.7" 420677

## (undated) DEVICE — SUCTION MANIFOLD DORNOCH ULTRA CART UL-CL500

## (undated) DEVICE — CLIP GEM MICRO GEM1521

## (undated) DEVICE — SPONGE RAY-TEC 4X8" 7318

## (undated) RX ORDER — HYDROMORPHONE HCL IN WATER/PF 6 MG/30 ML
PATIENT CONTROLLED ANALGESIA SYRINGE INTRAVENOUS
Status: DISPENSED
Start: 2022-04-28

## (undated) RX ORDER — LIDOCAINE HYDROCHLORIDE 20 MG/ML
INJECTION, SOLUTION EPIDURAL; INFILTRATION; INTRACAUDAL; PERINEURAL
Status: DISPENSED
Start: 2022-04-15

## (undated) RX ORDER — ROCURONIUM BROMIDE 50 MG/5 ML
SYRINGE (ML) INTRAVENOUS
Status: DISPENSED
Start: 2022-04-28

## (undated) RX ORDER — HEPARIN SODIUM 1000 [USP'U]/ML
INJECTION, SOLUTION INTRAVENOUS; SUBCUTANEOUS
Status: DISPENSED
Start: 2022-04-28

## (undated) RX ORDER — ACETAMINOPHEN 325 MG/1
TABLET ORAL
Status: DISPENSED
Start: 2019-12-30

## (undated) RX ORDER — PROPOFOL 10 MG/ML
INJECTION, EMULSION INTRAVENOUS
Status: DISPENSED
Start: 2022-04-15

## (undated) RX ORDER — OXYCODONE HYDROCHLORIDE 5 MG/1
TABLET ORAL
Status: DISPENSED
Start: 2019-12-30

## (undated) RX ORDER — LIDOCAINE HYDROCHLORIDE 20 MG/ML
INJECTION, SOLUTION EPIDURAL; INFILTRATION; INTRACAUDAL; PERINEURAL
Status: DISPENSED
Start: 2022-04-28

## (undated) RX ORDER — FENTANYL CITRATE 50 UG/ML
INJECTION, SOLUTION INTRAMUSCULAR; INTRAVENOUS
Status: DISPENSED
Start: 2019-12-06

## (undated) RX ORDER — PAPAVERINE HYDROCHLORIDE 30 MG/ML
INJECTION INTRAMUSCULAR; INTRAVENOUS
Status: DISPENSED
Start: 2022-04-28

## (undated) RX ORDER — BUPIVACAINE HYDROCHLORIDE 2.5 MG/ML
INJECTION, SOLUTION INFILTRATION; PERINEURAL
Status: DISPENSED
Start: 2022-05-23

## (undated) RX ORDER — BUPIVACAINE HYDROCHLORIDE 2.5 MG/ML
INJECTION, SOLUTION INFILTRATION; PERINEURAL
Status: DISPENSED
Start: 2019-12-06

## (undated) RX ORDER — ONDANSETRON 2 MG/ML
INJECTION INTRAMUSCULAR; INTRAVENOUS
Status: DISPENSED
Start: 2022-04-28

## (undated) RX ORDER — CEFAZOLIN SODIUM 2 G/50ML
SOLUTION INTRAVENOUS
Status: DISPENSED
Start: 2022-05-23

## (undated) RX ORDER — HYDROMORPHONE HYDROCHLORIDE 1 MG/ML
INJECTION, SOLUTION INTRAMUSCULAR; INTRAVENOUS; SUBCUTANEOUS
Status: DISPENSED
Start: 2019-12-30

## (undated) RX ORDER — FENTANYL CITRATE 50 UG/ML
INJECTION, SOLUTION INTRAMUSCULAR; INTRAVENOUS
Status: DISPENSED
Start: 2022-04-28

## (undated) RX ORDER — CEFAZOLIN SODIUM/WATER 2 G/20 ML
SYRINGE (ML) INTRAVENOUS
Status: DISPENSED
Start: 2022-04-15

## (undated) RX ORDER — PROPOFOL 10 MG/ML
INJECTION, EMULSION INTRAVENOUS
Status: DISPENSED
Start: 2022-05-23

## (undated) RX ORDER — ONDANSETRON 2 MG/ML
INJECTION INTRAMUSCULAR; INTRAVENOUS
Status: DISPENSED
Start: 2022-05-23

## (undated) RX ORDER — ONDANSETRON 2 MG/ML
INJECTION INTRAMUSCULAR; INTRAVENOUS
Status: DISPENSED
Start: 2022-04-15

## (undated) RX ORDER — MINERAL OIL
OIL (ML) MISCELLANEOUS
Status: DISPENSED
Start: 2022-04-28

## (undated) RX ORDER — ROCURONIUM BROMIDE 50 MG/5 ML
SYRINGE (ML) INTRAVENOUS
Status: DISPENSED
Start: 2022-04-15

## (undated) RX ORDER — EPHEDRINE SULFATE 50 MG/ML
INJECTION, SOLUTION INTRAMUSCULAR; INTRAVENOUS; SUBCUTANEOUS
Status: DISPENSED
Start: 2022-04-15

## (undated) RX ORDER — CEFAZOLIN SODIUM 1 G/3ML
INJECTION, POWDER, FOR SOLUTION INTRAMUSCULAR; INTRAVENOUS
Status: DISPENSED
Start: 2022-04-28

## (undated) RX ORDER — ACETAMINOPHEN 325 MG/1
TABLET ORAL
Status: DISPENSED
Start: 2022-04-15

## (undated) RX ORDER — PROPOFOL 10 MG/ML
INJECTION, EMULSION INTRAVENOUS
Status: DISPENSED
Start: 2019-12-06

## (undated) RX ORDER — LIDOCAINE HYDROCHLORIDE 10 MG/ML
INJECTION, SOLUTION EPIDURAL; INFILTRATION; INTRACAUDAL; PERINEURAL
Status: DISPENSED
Start: 2022-05-23

## (undated) RX ORDER — GLYCOPYRROLATE 0.2 MG/ML
INJECTION, SOLUTION INTRAMUSCULAR; INTRAVENOUS
Status: DISPENSED
Start: 2022-04-28

## (undated) RX ORDER — DEXAMETHASONE SODIUM PHOSPHATE 4 MG/ML
INJECTION, SOLUTION INTRA-ARTICULAR; INTRALESIONAL; INTRAMUSCULAR; INTRAVENOUS; SOFT TISSUE
Status: DISPENSED
Start: 2022-04-28

## (undated) RX ORDER — ACETAMINOPHEN 325 MG/1
TABLET ORAL
Status: DISPENSED
Start: 2022-04-28

## (undated) RX ORDER — HYDROMORPHONE HCL IN WATER/PF 6 MG/30 ML
PATIENT CONTROLLED ANALGESIA SYRINGE INTRAVENOUS
Status: DISPENSED
Start: 2022-04-15

## (undated) RX ORDER — ONDANSETRON 2 MG/ML
INJECTION INTRAMUSCULAR; INTRAVENOUS
Status: DISPENSED
Start: 2019-12-30

## (undated) RX ORDER — MINERAL OIL
OIL (ML) MISCELLANEOUS
Status: DISPENSED
Start: 2022-05-23

## (undated) RX ORDER — DEXAMETHASONE SODIUM PHOSPHATE 4 MG/ML
INJECTION, SOLUTION INTRA-ARTICULAR; INTRALESIONAL; INTRAMUSCULAR; INTRAVENOUS; SOFT TISSUE
Status: DISPENSED
Start: 2019-12-06

## (undated) RX ORDER — OXYCODONE HYDROCHLORIDE 5 MG/1
TABLET ORAL
Status: DISPENSED
Start: 2022-05-23

## (undated) RX ORDER — HYDROMORPHONE HCL/0.9% NACL/PF 0.2MG/0.2
SYRINGE (ML) INTRAVENOUS
Status: DISPENSED
Start: 2019-12-30

## (undated) RX ORDER — FENTANYL CITRATE 50 UG/ML
INJECTION, SOLUTION INTRAMUSCULAR; INTRAVENOUS
Status: DISPENSED
Start: 2022-05-23

## (undated) RX ORDER — FENTANYL CITRATE 50 UG/ML
INJECTION, SOLUTION INTRAMUSCULAR; INTRAVENOUS
Status: DISPENSED
Start: 2022-04-15

## (undated) RX ORDER — KETOROLAC TROMETHAMINE 30 MG/ML
INJECTION, SOLUTION INTRAMUSCULAR; INTRAVENOUS
Status: DISPENSED
Start: 2022-04-15

## (undated) RX ORDER — CEFAZOLIN SODIUM 2 G/100ML
INJECTION, SOLUTION INTRAVENOUS
Status: DISPENSED
Start: 2019-12-06

## (undated) RX ORDER — DEXAMETHASONE SODIUM PHOSPHATE 4 MG/ML
INJECTION, SOLUTION INTRA-ARTICULAR; INTRALESIONAL; INTRAMUSCULAR; INTRAVENOUS; SOFT TISSUE
Status: DISPENSED
Start: 2022-04-15

## (undated) RX ORDER — HYDROMORPHONE HYDROCHLORIDE 1 MG/ML
INJECTION, SOLUTION INTRAMUSCULAR; INTRAVENOUS; SUBCUTANEOUS
Status: DISPENSED
Start: 2022-04-15

## (undated) RX ORDER — OXYCODONE HYDROCHLORIDE 5 MG/1
TABLET ORAL
Status: DISPENSED
Start: 2022-04-15

## (undated) RX ORDER — EPINEPHRINE 1 MG/ML
INJECTION, SOLUTION INTRAMUSCULAR; SUBCUTANEOUS
Status: DISPENSED
Start: 2019-12-30

## (undated) RX ORDER — FENTANYL CITRATE-0.9 % NACL/PF 10 MCG/ML
PLASTIC BAG, INJECTION (ML) INTRAVENOUS
Status: DISPENSED
Start: 2022-05-23

## (undated) RX ORDER — FENTANYL CITRATE-0.9 % NACL/PF 10 MCG/ML
PLASTIC BAG, INJECTION (ML) INTRAVENOUS
Status: DISPENSED
Start: 2022-04-15

## (undated) RX ORDER — CEFAZOLIN SODIUM 2 G/100ML
INJECTION, SOLUTION INTRAVENOUS
Status: DISPENSED
Start: 2019-12-30

## (undated) RX ORDER — FENTANYL CITRATE 50 UG/ML
INJECTION, SOLUTION INTRAMUSCULAR; INTRAVENOUS
Status: DISPENSED
Start: 2019-12-30

## (undated) RX ORDER — HYDROMORPHONE HYDROCHLORIDE 1 MG/ML
INJECTION, SOLUTION INTRAMUSCULAR; INTRAVENOUS; SUBCUTANEOUS
Status: DISPENSED
Start: 2022-04-28

## (undated) RX ORDER — GABAPENTIN 300 MG/1
CAPSULE ORAL
Status: DISPENSED
Start: 2019-12-06

## (undated) RX ORDER — BUPIVACAINE HYDROCHLORIDE 2.5 MG/ML
INJECTION, SOLUTION EPIDURAL; INFILTRATION; INTRACAUDAL
Status: DISPENSED
Start: 2022-04-15

## (undated) RX ORDER — ACETAMINOPHEN 325 MG/1
TABLET ORAL
Status: DISPENSED
Start: 2022-05-23

## (undated) RX ORDER — PROPOFOL 10 MG/ML
INJECTION, EMULSION INTRAVENOUS
Status: DISPENSED
Start: 2022-04-28

## (undated) RX ORDER — MINERAL OIL
OIL (ML) MISCELLANEOUS
Status: DISPENSED
Start: 2019-12-30

## (undated) RX ORDER — LIDOCAINE HYDROCHLORIDE 20 MG/ML
INJECTION, SOLUTION EPIDURAL; INFILTRATION; INTRACAUDAL; PERINEURAL
Status: DISPENSED
Start: 2022-05-23

## (undated) RX ORDER — CEFAZOLIN SODIUM/WATER 2 G/20 ML
SYRINGE (ML) INTRAVENOUS
Status: DISPENSED
Start: 2022-04-28

## (undated) RX ORDER — OXYCODONE HYDROCHLORIDE 5 MG/1
TABLET ORAL
Status: DISPENSED
Start: 2019-12-06

## (undated) RX ORDER — LIDOCAINE HYDROCHLORIDE 20 MG/ML
INJECTION, SOLUTION EPIDURAL; INFILTRATION; INTRACAUDAL; PERINEURAL
Status: DISPENSED
Start: 2019-12-06

## (undated) RX ORDER — ONDANSETRON 2 MG/ML
INJECTION INTRAMUSCULAR; INTRAVENOUS
Status: DISPENSED
Start: 2019-12-06

## (undated) RX ORDER — EPHEDRINE SULFATE 50 MG/ML
INJECTION, SOLUTION INTRAMUSCULAR; INTRAVENOUS; SUBCUTANEOUS
Status: DISPENSED
Start: 2022-05-23

## (undated) RX ORDER — LIDOCAINE HYDROCHLORIDE AND EPINEPHRINE 10; 10 MG/ML; UG/ML
INJECTION, SOLUTION INFILTRATION; PERINEURAL
Status: DISPENSED
Start: 2019-12-30

## (undated) RX ORDER — GABAPENTIN 300 MG/1
CAPSULE ORAL
Status: DISPENSED
Start: 2022-05-23

## (undated) RX ORDER — LIDOCAINE HYDROCHLORIDE AND EPINEPHRINE 10; 10 MG/ML; UG/ML
INJECTION, SOLUTION INFILTRATION; PERINEURAL
Status: DISPENSED
Start: 2022-05-23

## (undated) RX ORDER — GABAPENTIN 300 MG/1
CAPSULE ORAL
Status: DISPENSED
Start: 2022-04-28

## (undated) RX ORDER — SODIUM CHLORIDE, SODIUM LACTATE, POTASSIUM CHLORIDE, CALCIUM CHLORIDE 600; 310; 30; 20 MG/100ML; MG/100ML; MG/100ML; MG/100ML
INJECTION, SOLUTION INTRAVENOUS
Status: DISPENSED
Start: 2022-04-28

## (undated) RX ORDER — EPINEPHRINE 1 MG/ML
INJECTION, SOLUTION INTRAMUSCULAR; SUBCUTANEOUS
Status: DISPENSED
Start: 2022-04-28